# Patient Record
Sex: FEMALE | Race: ASIAN | NOT HISPANIC OR LATINO | Employment: FULL TIME | URBAN - METROPOLITAN AREA
[De-identification: names, ages, dates, MRNs, and addresses within clinical notes are randomized per-mention and may not be internally consistent; named-entity substitution may affect disease eponyms.]

---

## 2017-02-27 DIAGNOSIS — I10 ESSENTIAL HYPERTENSION WITH GOAL BLOOD PRESSURE LESS THAN 140/90: ICD-10-CM

## 2017-03-01 RX ORDER — LOSARTAN POTASSIUM 25 MG/1
TABLET ORAL
Qty: 90 TABLET | Refills: 1 | Status: SHIPPED | OUTPATIENT
Start: 2017-03-01 | End: 2017-06-13

## 2017-03-01 NOTE — TELEPHONE ENCOUNTER
Prescription approved per [facility], [facility] or [facility] refill protocol.  Yoon Bueno RN  [facility] [employer]

## 2017-03-01 NOTE — TELEPHONE ENCOUNTER
Losartan      Last Written Prescription Date: 9/2/16  Last Fill Quantity: 90, # refills: 1  Last Office Visit with [facility], [facility] or [facility] prescribing provider: 9/2/16       Potassium   Date Value Ref Range Status   04/28/2016 4.2 3.4 - 5.3 mmol/L Final     Creatinine   Date Value Ref Range Status   04/28/2016 0.77 0.52 - 1.04 mg/dL Final     BP Readings from Last 3 Encounters:   09/02/16 120/80   06/23/16 120/82   04/28/16 128/88     Nell Victor CMA

## 2017-06-13 ENCOUNTER — OFFICE VISIT (OUTPATIENT)
Dept: FAMILY MEDICINE | Facility: CLINIC | Age: 42
End: 2017-06-13
Payer: COMMERCIAL

## 2017-06-13 VITALS
HEIGHT: 63 IN | TEMPERATURE: 99 F | SYSTOLIC BLOOD PRESSURE: 136 MMHG | DIASTOLIC BLOOD PRESSURE: 88 MMHG | WEIGHT: 152 LBS | OXYGEN SATURATION: 97 % | HEART RATE: 76 BPM | BODY MASS INDEX: 26.93 KG/M2

## 2017-06-13 DIAGNOSIS — I10 ESSENTIAL HYPERTENSION WITH GOAL BLOOD PRESSURE LESS THAN 140/90: ICD-10-CM

## 2017-06-13 PROCEDURE — 80053 COMPREHEN METABOLIC PANEL: CPT | Performed by: FAMILY MEDICINE

## 2017-06-13 PROCEDURE — 36415 COLL VENOUS BLD VENIPUNCTURE: CPT | Performed by: FAMILY MEDICINE

## 2017-06-13 PROCEDURE — 99213 OFFICE O/P EST LOW 20 MIN: CPT | Performed by: FAMILY MEDICINE

## 2017-06-13 RX ORDER — LOSARTAN POTASSIUM 25 MG/1
25 TABLET ORAL DAILY
Qty: 90 TABLET | Refills: 1 | Status: SHIPPED | OUTPATIENT
Start: 2017-06-13 | End: 2017-07-12 | Stop reason: DRUGHIGH

## 2017-06-13 NOTE — NURSING NOTE
"Chief Complaint   Patient presents with     Hypertension       Initial /88  Pulse 76  Temp 99  F (37.2  C) (Tympanic)  Ht 5' 2.5\" (1.588 m)  Wt 152 lb (68.9 kg)  LMP 06/03/2017  SpO2 97%  BMI 27.36 kg/m2 Estimated body mass index is 27.36 kg/(m^2) as calculated from the following:    Height as of this encounter: 5' 2.5\" (1.588 m).    Weight as of this encounter: 152 lb (68.9 kg).  Medication Reconciliation: complete  "

## 2017-06-13 NOTE — PATIENT INSTRUCTIONS
Check labs  Take medications as directed.  Treatment  and  cares discussed   Follow up if problem or concern

## 2017-06-13 NOTE — PROGRESS NOTES
SUBJECTIVE:                                                    Juany Blandon is a 41 year old female who presents to clinic today for the following health issues:      Hypertension Follow-up      Outpatient blood pressures are being checked at home.  Results are slightly elevated sometimes  140/110,not sure if her machine could be causing error . She feels well otherwise . Denies any chest pain, palpitation, sob, leg edema etc.     Low Salt Diet: no added salt, although could do better with diet        Amount of exercise or physical activity: None    Problems taking medications regularly: No    Medication side effects: none    Diet: regular (no restrictions)          Problem list and histories reviewed & adjusted, as indicated.  Additional history: as documented    Patient Active Problem List   Diagnosis     JOINT PAIN-MULT JTS, W/U NEG ~     Essential hypertension     CARDIOVASCULAR SCREENING; LDL GOAL LESS THAN 160     Former smoker     Lumbar radiculopathy     HTN, goal below 140/90     Gastroesophageal reflux disease, esophagitis presence not specified     Seasonal allergic rhinitis     Essential hypertension with goal blood pressure less than 140/90     Past Surgical History:   Procedure Laterality Date     C NONSPECIFIC PROCEDURE      L BREAST BX, bening 2014       SECTION             Social History   Substance Use Topics     Smoking status: Former Smoker     Packs/day: 0.50     Types: Cigarettes     Quit date: 2010     Smokeless tobacco: Never Used     Alcohol use No     Family History   Problem Relation Age of Onset     DIABETES Maternal Grandmother      Arthritis Paternal Grandmother      Blood Disease Father      non higd lymphoma     Gynecology Mother      fibroids     Lipids Mother      C.A.D. No family hx of      Breast Cancer No family hx of      Cancer - colorectal No family hx of      Hypertension Mother            Reviewed and updated as needed this visit by clinical  "staff       Reviewed and updated as needed this visit by Provider         ROS:  Constitutional, HEENT, cardiovascular, pulmonary, GI, , musculoskeletal, neuro, skin, endocrine and psych systems are negative, except as otherwise noted.    OBJECTIVE:                                                    /88  Pulse 76  Temp 99  F (37.2  C) (Tympanic)  Ht 5' 2.5\" (1.588 m)  Wt 152 lb (68.9 kg)  LMP 06/03/2017  SpO2 97%  BMI 27.36 kg/m2  Body mass index is 27.36 kg/(m^2).  GENERAL: healthy, alert and no distress  NECK: no adenopathy  RESP: lungs clear to auscultation - no rales, rhonchi or wheezes  CV: regular rate and rhythm, normal S1 S2, no S3 or S4,   ABDOMEN: soft, nontender, no hepatosplenomegaly, no masses and bowel sounds normal  MS: no edema         ASSESSMENT/PLAN:                                                        (I10) Essential hypertension with goal blood pressure less than 140/90  Comment:   Plan: losartan (COZAAR) 25 MG tablet, Comprehensive         metabolic panel            BP in adequate control today . Discussed cares, low fat low slat diet etc. Check labs, call pt with results. Refill given. encouraged clinic nurse visit for  BP check and bring her monitor for check to make sure it is not malfunctioning  Follow up  if problem or concerns       Patient expressed understanding and agreement with treatment plan. All patient's questions were answered, will let me know if has more later.  Medications: Rx's: Reviewed the potential side effects/complications of medications prescribed.       Korina Garcia MD  [facility]    "

## 2017-06-13 NOTE — MR AVS SNAPSHOT
After Visit Summary   6/13/2017    Juany Blandon    MRN: [id number]           Patient Information     Date Of Birth          1975        Visit Information        Provider Department      6/13/2017 4:30 PM Korina Garcia MD [facility]        Today's Diagnoses     Essential hypertension with goal blood pressure less than 140/90          Care Instructions    Check labs  Take medications as directed.  Treatment  and  cares discussed   Follow up if problem or concern             Follow-ups after your visit        Who to contact     If you have questions or need follow up information about today's clinic visit or your schedule please contact [facility] directly at [phone].  Normal or non-critical lab and imaging results will be communicated to you by MyChart, letter or phone within 4 business days after the clinic has received the results. If you do not hear from us within 7 days, please contact the clinic through [employer] or phone. If you have a critical or abnormal lab result, we will notify you by phone as soon as possible.  Submit refill requests through [employer] or call your pharmacy and they will forward the refill request to us. Please allow 3 business days for your refill to be completed.          Additional Information About Your Visit        MyChart Information     [employer] gives you secure access to your electronic health record. If you see a primary care provider, you can also send messages to your care team and make appointments. If you have questions, please call your primary care clinic.  If you do not have a primary care provider, please call [phone] and they will assist you.        Care EveryWhere ID     This is your Care EveryWhere ID. This could be used by other organizations to access your [facility] medical records  [id number]        Your Vitals Were     Pulse Temperature Height Last Period Pulse Oximetry BMI (Body Mass Index)  "   76 99  F (37.2  C) (Tympanic) 5' 2.5\" (1.588 m) 06/03/2017 97% 27.36 kg/m2       Blood Pressure from Last 3 Encounters:   06/13/17 136/88   09/02/16 120/80   06/23/16 120/82    Weight from Last 3 Encounters:   06/13/17 152 lb (68.9 kg)   09/02/16 157 lb 9.6 oz (71.5 kg)   06/23/16 157 lb 3.2 oz (71.3 kg)              We Performed the Following     Comprehensive metabolic panel          Today's Medication Changes          These changes are accurate as of: 6/13/17  4:43 PM.  If you have any questions, ask your nurse or doctor.               These medicines have changed or have updated prescriptions.        Dose/Directions    losartan 25 MG tablet   Commonly known as:  COZAAR   This may have changed:  See the new instructions.   Used for:  Essential hypertension with goal blood pressure less than 140/90   Changed by:  Korina Garcia MD        Dose:  25 mg   Take 1 tablet (25 mg) by mouth daily   Quantity:  90 tablet   Refills:  1            Where to get your medicines      These medications were sent to [id number] - [place], [place] - [address] AT [address]  [address], [place] [address]     Phone:  [phone]     losartan 25 MG tablet                Primary Care Provider Office Phone # Fax #    Korina Garcia -[phone]       [facility] [address] [place] [address] [address]  [place] [address]        Thank you!     [facility]  [facility] care. Our goal is always to provide you with excellent care. Hearing back from our patients is one way we can continue to improve our services. Please take a few minutes to complete the written survey that you may receive in the mail after your visit with us. Thank you!             Your Updated Medication List - Protect others around you: Learn how to safely use, store and throw away your medicines at www.disposemymeds.org.          This list " is accurate as of: 6/13/17  4:43 PM.  Always use your most recent med list.                   Brand Name Dispense Instructions for use    losartan 25 MG tablet    COZAAR    90 tablet    Take 1 tablet (25 mg) by mouth daily

## 2017-06-15 LAB
ALBUMIN SERPL-MCNC: 3.8 G/DL (ref 3.4–5)
ALP SERPL-CCNC: 63 U/L (ref 40–150)
ALT SERPL W P-5'-P-CCNC: 22 U/L (ref 0–50)
ANION GAP SERPL CALCULATED.3IONS-SCNC: 8 MMOL/L (ref 3–14)
AST SERPL W P-5'-P-CCNC: 19 U/L (ref 0–45)
BILIRUB SERPL-MCNC: 1 MG/DL (ref 0.2–1.3)
BUN SERPL-MCNC: 19 MG/DL (ref 7–30)
CALCIUM SERPL-MCNC: 8.7 MG/DL (ref 8.5–10.1)
CHLORIDE SERPL-SCNC: 101 MMOL/L (ref 94–109)
CO2 SERPL-SCNC: 28 MMOL/L (ref 20–32)
CREAT SERPL-MCNC: 0.79 MG/DL (ref 0.52–1.04)
GFR SERPL CREATININE-BSD FRML MDRD: 80 ML/MIN/1.7M2
GLUCOSE SERPL-MCNC: 82 MG/DL (ref 70–99)
POTASSIUM SERPL-SCNC: 3.8 MMOL/L (ref 3.4–5.3)
PROT SERPL-MCNC: 7.4 G/DL (ref 6.8–8.8)
SODIUM SERPL-SCNC: 137 MMOL/L (ref 133–144)

## 2017-07-07 ENCOUNTER — APPOINTMENT (OUTPATIENT)
Dept: GENERAL RADIOLOGY | Facility: CLINIC | Age: 42
End: 2017-07-07
Attending: PHYSICIAN ASSISTANT
Payer: COMMERCIAL

## 2017-07-07 ENCOUNTER — HOSPITAL ENCOUNTER (EMERGENCY)
Facility: CLINIC | Age: 42
Discharge: HOME | End: 2017-07-07
Attending: PHYSICIAN ASSISTANT | Admitting: PHYSICIAN ASSISTANT
Payer: COMMERCIAL

## 2017-07-07 VITALS
WEIGHT: 147 LBS | HEART RATE: 79 BPM | HEIGHT: 62 IN | BODY MASS INDEX: 27.05 KG/M2 | OXYGEN SATURATION: 97 % | RESPIRATION RATE: 16 BRPM | DIASTOLIC BLOOD PRESSURE: 97 MMHG | SYSTOLIC BLOOD PRESSURE: 143 MMHG | TEMPERATURE: 98.6 F

## 2017-07-07 DIAGNOSIS — R07.89 ATYPICAL CHEST PAIN: ICD-10-CM

## 2017-07-07 DIAGNOSIS — R04.0 EPISTAXIS: ICD-10-CM

## 2017-07-07 LAB
ANION GAP SERPL CALCULATED.3IONS-SCNC: 6 MMOL/L (ref 3–14)
BASOPHILS # BLD AUTO: 0 10E9/L (ref 0–0.2)
BASOPHILS NFR BLD AUTO: 0.3 %
BUN SERPL-MCNC: 18 MG/DL (ref 7–30)
CALCIUM SERPL-MCNC: 8.1 MG/DL (ref 8.5–10.1)
CHLORIDE SERPL-SCNC: 104 MMOL/L (ref 94–109)
CO2 SERPL-SCNC: 30 MMOL/L (ref 20–32)
CREAT SERPL-MCNC: 0.75 MG/DL (ref 0.52–1.04)
DIFFERENTIAL METHOD BLD: NORMAL
EOSINOPHIL # BLD AUTO: 0.1 10E9/L (ref 0–0.7)
EOSINOPHIL NFR BLD AUTO: 1.7 %
ERYTHROCYTE [DISTWIDTH] IN BLOOD BY AUTOMATED COUNT: 12.4 % (ref 10–15)
GFR SERPL CREATININE-BSD FRML MDRD: 85 ML/MIN/1.7M2
GLUCOSE SERPL-MCNC: 105 MG/DL (ref 70–99)
HCT VFR BLD AUTO: 37.2 % (ref 35–47)
HGB BLD-MCNC: 13.2 G/DL (ref 11.7–15.7)
IMM GRANULOCYTES # BLD: 0 10E9/L (ref 0–0.4)
IMM GRANULOCYTES NFR BLD: 0.1 %
INTERPRETATION ECG - MUSE: NORMAL
LYMPHOCYTES # BLD AUTO: 3 10E9/L (ref 0.8–5.3)
LYMPHOCYTES NFR BLD AUTO: 39.6 %
MCH RBC QN AUTO: 31.4 PG (ref 26.5–33)
MCHC RBC AUTO-ENTMCNC: 35.5 G/DL (ref 31.5–36.5)
MCV RBC AUTO: 89 FL (ref 78–100)
MONOCYTES # BLD AUTO: 0.4 10E9/L (ref 0–1.3)
MONOCYTES NFR BLD AUTO: 5.8 %
NEUTROPHILS # BLD AUTO: 4 10E9/L (ref 1.6–8.3)
NEUTROPHILS NFR BLD AUTO: 52.5 %
NRBC # BLD AUTO: 0 10*3/UL
NRBC BLD AUTO-RTO: 0 /100
PLATELET # BLD AUTO: 272 10E9/L (ref 150–450)
POTASSIUM SERPL-SCNC: 4 MMOL/L (ref 3.4–5.3)
RBC # BLD AUTO: 4.2 10E12/L (ref 3.8–5.2)
SODIUM SERPL-SCNC: 140 MMOL/L (ref 133–144)
TROPONIN I SERPL-MCNC: NORMAL UG/L (ref 0–0.04)
WBC # BLD AUTO: 7.7 10E9/L (ref 4–11)

## 2017-07-07 PROCEDURE — 84484 ASSAY OF TROPONIN QUANT: CPT | Performed by: PHYSICIAN ASSISTANT

## 2017-07-07 PROCEDURE — 99285 EMERGENCY DEPT VISIT HI MDM: CPT

## 2017-07-07 PROCEDURE — 30901 CONTROL OF NOSEBLEED: CPT | Mod: RT

## 2017-07-07 PROCEDURE — 85025 COMPLETE CBC W/AUTO DIFF WBC: CPT | Performed by: PHYSICIAN ASSISTANT

## 2017-07-07 PROCEDURE — 80048 BASIC METABOLIC PNL TOTAL CA: CPT | Performed by: PHYSICIAN ASSISTANT

## 2017-07-07 PROCEDURE — 71020 XR CHEST 2 VW: CPT

## 2017-07-07 PROCEDURE — 93005 ELECTROCARDIOGRAM TRACING: CPT

## 2017-07-07 RX ORDER — LIDOCAINE HYDROCHLORIDE 40 MG/ML
SOLUTION TOPICAL
Status: DISCONTINUED
Start: 2017-07-07 | End: 2017-07-08 | Stop reason: HOSPADM

## 2017-07-07 NOTE — ED AVS SNAPSHOT
Emergency Department    [address] [place] [address]    Phone:  [phone]    Fax:  [phone]                                       Juany Blandon   MRN: [id number]    Department:   Emergency Department   Date of Visit:  7/7/2017           After Visit Summary Signature Page     I have received my discharge instructions, and my questions have been answered. I have discussed any challenges I see with this plan with the nurse or doctor.    ..........................................................................................................................................  Patient/Patient Representative Signature      ..........................................................................................................................................  Patient Representative Print Name and Relationship to Patient    ..................................................               ................................................  Date                                            Time    ..........................................................................................................................................  Reviewed by Signature/Title    ...................................................              ..............................................  Date                                                            Time

## 2017-07-08 ASSESSMENT — ENCOUNTER SYMPTOMS
NAUSEA: 0
BACK PAIN: 0
NECK PAIN: 0
FEVER: 0
DIZZINESS: 0
SHORTNESS OF BREATH: 0
VOMITING: 0
CHILLS: 0
COUGH: 0
ABDOMINAL PAIN: 0
LIGHT-HEADEDNESS: 1
PALPITATIONS: 0

## 2017-07-08 NOTE — DISCHARGE INSTRUCTIONS
"  Discharge Instructions  Epistaxis    Today you were seen for a nosebleed.   Nosebleeds (the medical term is \"epistaxis\") are very common. Almost every person has had at least one in their lifetime.  Although the amount of blood loss can appear dramatic, nosebleeds rarely cause serious problems.  The most common causes are dry air or nose picking, but they also are common in people who have allergies, high blood pressure or are on blood thinners, such as Coumadin, Aspirin or Plavix. If you or your child gets a nosebleed, the important thing is to know how to take care of it. With the right self-care, most nosebleeds will stop on their own.    Return to the Emergency Department if:    Your nose is bleeding a very large amount of blood.    You get very pale, faint, or tired.    You cannot get the bleeding to stop after following these instructions.    A nosebleed happens after recent nasal surgery or if you have a known nasal tumor.    You have new, serious symptoms, such as chest pain.    You get a nosebleed after an injury, such as being hit in the face, and you are concerned that you could have other injuries (like a broken bone).    Treatment:    Your doctor may tell you to use a decongestant nose spray, like Afrin  (oxymetazoline), in both nostrils in the morning and at night for the next three days. Do not use this medicine for more than three days at a time.  If you do it will cause nasal congestion.     You should apply a small amount of Vaseline  to the inside of your nostrils for moisture before bed.  Using a humidifier in your bedroom will help as well.    For the next three days, do not blow your nose or put anything in your nose. You may sniffle, or dab the outside of your nose.    Do not bend with your head below your waist for the next three days. Do not lift anything so heavy that you have to strain.     If you received nasal packing, please do not remove the packing until seen by an Ear Nose and " Throat specialist.  If antibiotics have been given with the packing please take as directed.    If your nose starts to bleed again:    Blow your nose to get rid of some of the clots that have formed inside your nostrils. This may increase the bleeding temporarily, but that's OK.    Spray decongestant nose spray (like Afrin ) into both nostrils to constrict the blood vessels.    Sit or stand while bending forward slightly at the waist. Do not lie down or tilt your head back. This may cause you to swallow blood and can lead to vomiting.     the soft part of BOTH nostrils at the bottom of your nose and squeeze your nose closed for at least 5 minutes (for children) or 10 to 15 minutes (for adults). You can use your fingers, or the clip we gave you here. Use a clock to time yourself. Do not release the pressure every so often to check whether the bleeding has stopped. Many people hurt their chances of stopping the bleeding by releasing the pressure too soon or too often.    If you follow the steps outlined above, and your nose continues to bleed, repeat all the steps once more. Apply pressure for a total of at least 30 minutes. If you continue to bleed even then, return to the Emergency Department or go to an urgent care clinic.    If you keep having nose bleeds, schedule an appointment with your regular doctor, or with an Ear, Nose, and Throat Specialist.  If you were given a prescription for medicine here today, be sure to read all of the information (including the package insert) that comes with your prescription.  This will include important information about the medicine, its side effects, and any warnings that you need to know about.  The pharmacist who fills the prescription can provide more information and answer questions you may have about the medicine.  If you have questions or concerns that the pharmacist cannot address, please call or return to the Emergency Department.   Discharge Instructions  Chest  Pain    You have been seen today for chest pain or discomfort.  At this time, your doctor has found no signs that your chest pain is due to a serious or life-threatening condition, (or you have declined more testing and/or admission to the hospital). However, sometimes there is a serious problem that does not show up right away. Your evaluation today may not be complete and you may need further testing and evaluation.     You need to follow-up with your regular doctor within 3 days.    Return to the Emergency Department if:    Your chest pain changes, gets worse, starts to happen more often, or comes with less activity.    You are short of breath.    You get very weak or tired.    You pass out or faint.    You have any new symptoms, like fever, cough, numb legs, or you cough up blood.    You have anything else that worries you.    Until you follow-up with your regular doctor please do the following:    Take one aspirin daily unless you have an allergy or are told not to by your doctor.    If a stress test appointment has been made, go to the appointment.    If you have questions, contact your regular doctor.    If your doctor today has told you to follow-up with your regular doctor, it is very important that you make an appointment with your clinic and go to the appointment.  If you do not follow-up with your primary doctor, it may result in missing an important development which could result in permanent injury or disability and/or lasting pain.  If there is any problem keeping your appointment, call your doctor or return to the Emergency Department.    If you were given a prescription for medicine here today, be sure to read all of the information (including the package insert) that comes with your prescription.  This will include important information about the medicine, its side effects, and any warnings that you need to know about.  The pharmacist who fills the prescription can provide more information and  answer questions you may have about the medicine.  If you have questions or concerns that the pharmacist cannot address, please call or return to the Emergency Department.         Remember that you can always come back to the Emergency Department if you are not able to see your regular doctor in the amount of time listed above, if you get any new symptoms, or if there is anything that worries you.

## 2017-07-08 NOTE — ED PROVIDER NOTES
"  History     Chief Complaint:  Epistaxis and chest pain    HPI   Juany Blandon is a 41 year old female with a history of HTN and GERD who presents to the ED for evaluation of epistaxis and chest pain. The patient presents with multiple complaints today including epistaxis, chest pain as well as concern for elevated blood pressure.  The patient notes that about 1 PM this afternoon she developed diffuse chest pressure that has been constant since onset.  This does not radiate.  Nothing has made it better or worse.  She notes associated lightheadedness, but no shortness of breath.  At 7 PM this evening the patient developed a right-sided epistaxis that resolved after 30 minutes.  She then took her blood pressure and noticed it to be elevated in the 140s over 100s.  The compilation of these symptoms concerned her and she presented to the emergency department.  She notes that she has had bloody noses with high blood pressure in the past.  She's been taking losartan and often has fluctuations in the control of her blood pressure.  She has had similar chest pressure before in the past and has had a negative stress echocardiogram in 2013 as well as a fairly normal echocardiogram from 2016 as detailed below.  She denies any fevers, chills, palpitations, shortness of breath or cough,abdominal pain, nausea or vomiting, leg pain or swelling, or any other symptoms or concerns.    Echocardiogram from 4/15/16:  \"The visual ejection fraction is estimated at 55-60%.  There is mild concentric left ventricular hypertrophy.\"    Cardiac Risk Factors:  CAD:    no  Hypertension:   yes  Hyperlipidemia:  no  Diabetes:   no  Tobacco use:   Patient reports no, but old history says quit 7 years ago   Gender:   Female   Age:    41  Familial Hx of CAD:  no     PE/DVT Risk Factors:   Hx of PE/DVT:   no  Hx of clotting disorder:  no  Hx of cancer:    no  Tobacco use:    Patient reports no, but old history says quit 7 years ago   Hormone " therapy:   no  Pregnancy:    no  Prolonged immobilization:  no  Recent surgery:   no  Recent travel:    no  Familial Hx of PE/DVT:  no     Allergies:  No Known Allergies   Medications:      losartan (COZAAR) 25 MG tablet     Past Medical History:    Past Medical History:   Diagnosis Date     HX FIBROADENOMA L BREAST      JOINT PAIN-MULT JDEANGELO, W/U NEG ~      SPONTANEOUS  03       Patient Active Problem List    Diagnosis Date Noted     Essential hypertension with goal blood pressure less than 140/90 2016     Priority: Medium     Seasonal allergic rhinitis 2016     Priority: Medium     also dust, cats        Gastroesophageal reflux disease, esophagitis presence not specified 2016     Priority: Medium     HTN, goal below 140/90 10/13/2014     Lumbar radiculopathy 2013     Former smoker 2012     CARDIOVASCULAR SCREENING; LDL GOAL LESS THAN 160 10/31/2010     Essential hypertension 2008     Problem list name updated by automated process. Provider to review       JOINT PAIN-TIARRA KOEHLER, W/U NEG ~1998 10/26/2003      Past Surgical History:    Past Surgical History:   Procedure Laterality Date     C NONSPECIFIC PROCEDURE      L BREAST BX, bening 2014       SECTION            Family History:    family history includes Arthritis in her paternal grandmother; Blood Disease in her father; DIABETES in her maternal grandmother; Gynecology in her mother; Hypertension in her mother; Lipids in her mother. There is no history of C.A.D., Breast Cancer, or Cancer - colorectal.    Social History:   reports that she quit smoking about 7 years ago. Her smoking use included Cigarettes. She smoked 0.50 packs per day. She has never used smokeless tobacco. She reports that she does not drink alcohol or use illicit drugs.    PCP: Korina Garcia     Review of Systems   Constitutional: Negative for chills and fever.   HENT: Positive for nosebleeds.    Respiratory: Negative for  "cough and shortness of breath.    Cardiovascular: Positive for chest pain. Negative for palpitations and leg swelling.   Gastrointestinal: Negative for abdominal pain, nausea and vomiting.   Musculoskeletal: Negative for back pain and neck pain.        - leg pain   Neurological: Positive for light-headedness. Negative for dizziness.   All other systems reviewed and are negative.        Physical Exam     Patient Vitals for the past 24 hrs:   BP Temp Temp src Pulse Heart Rate Resp SpO2 Height Weight   07/07/17 2241 (!) 143/97 - - - 78 - 97 % - -   07/07/17 2240 (!) 143/97 - - - - 16 98 % - -   07/07/17 2129 138/90 - - - - - - - -   07/07/17 2103 (!) 156/96 98.6  F (37  C) Oral 79 - 16 98 % 1.575 m (5' 2\") 66.7 kg (147 lb)        Physical Exam  General: Resting comfortably on the gurney.    Head:  The scalp, head and face appear normal. No evidence of trauma.   ENT:  Pupils are equal, round and reactive to light.     Oropharynx is moist.  No uvular deviation. Posterior oropharynx is without erythema, exudate or tonsillar swelling.     Clotted area of friability to the right anterior nasal septum. No active bleeding. No evidence of posterior bleeding.   Neck:  Supple, no rigidity noted. Normal ROM.     Trachea midline. No mass detected.    Resp:  Non-labored breathing. No tachypnea.     Lung fields clear to auscultation without wheezes or rales.   CV:  Regular rate and rhythm. Normal S1 and S2, no S3 or S4.     No pathological murmur detected.     Radial and PT pulses intact and symmetric.   GI:  Abdomen is soft and non-distended.     Non-tender to palpation in all four quadrants.    MS:  Normal muscular tone.     No asymmetrical lower leg swelling or calf tenderness.   Neuro:  Awake and alert. Speech is clear.   Skin:  No rash or pallor.  Psych: Normal affect. Appropriate interactions.       Emergency Department Course     ECG @ 2142  Vent Rate: 75 BPM   RI Interval: 180 ms   QRS duration: 74 ms   QT/QTc: 412/460 ms "   P-R-T Axis: 69 53 53  Findings: Normal sinus rhythm. No significant change compared to ECG dated 4/16/16.    Imaging:  XR Chest 2 Views   Final Result   IMPRESSION: No active infiltrate.          JUANY COPELAND MD           Laboratory:  CBC: WNL (WBC 7.7, HGB 13.2, platelets 272)  BMP: Glucose 105 high, Calcium 8.1 low, otherwise WNL (Creatinine 0.75)  Troponin 2147: <0.015    Procedures:  PROCEDURE: Silver Nitrate Cautery     PROCEDURE NOTE: The patient's right nare was prepped with lidocaine.  The location of the patient's bleeding in the anterior septum was identified and cauterized with silver nitrate.  The patient tolerated the procedure well and there were no complications.  She was observed in the emergency department following the procedure and had no recurrence of her bleeding.       Emergency Department Course:  Past medical records, nursing notes, and vitals reviewed.  I performed an exam of the patient and obtained history, as documented above.  IV inserted and blood drawn.   Procedure performed as above.   I rechecked the patient. Findings and plan explained to the Patient. Patient was discharged.    Impression & Plan      Medical Decision Making:  Juany is a 41-year-old female with a history of HTN, who presented with chest pain and epistaxis. Concern was for cardiac etiology, dissection, PE, pneumonia, among others. ECG was obtained and is normal. There is no evidence of acute ischemic event or arrhythmia. A troponin 6+ hours after onset of symptoms is negative, making acute cardiac event less likely. The patient is low risk by Wells and is PERC negative. Her basic lab work returned unremarkable. No evidence of leukocytosis, anemia, concerning electrolyte abnormality, or renal dysfunction. CXR is normal without signs of infiltrate, cardiomegaly, widened mediastinum, pneumothorax or any other acute abnormality. She has symmetric pulses, no back pain, and no widened mediastinum, making dissection  unlikely. At this time the exact cause of her chest pressure is not clear, though she notes gradual improvement throughout the day. She has had similar in the past. She has a Heart Score of 1 and I feel she is safe for discharge. Out patient stress test was ordered. She also noted concern for high blood pressure and she is mildly high here, more so in the diastolic. She notes frequently fluctuations. No signs of hypertensive urgency or emergency and I do feel she is safe for discharge with close PCP recheck. She notes follow-up with her PCP early next week already scheduled.     The patient also presented with epistaxis. She is not on blood thinners. No septal hematoma on exam. The bleeding was controlled as described above. I discussed the plan and any additional questions with the patient. She verbalized understanding and agreement with the plan.  We discussed reasons to return to the ED including syncope, worsening or changing chest pain, dyspnea, persistent nose bleeding, or any other complications or concerns.       Diagnosis:    ICD-10-CM    1. Epistaxis R04.0    2. Atypical chest pain R07.89 Exercise Stress Echocardiogram          7/7/2017   Zaida Wright PA-C Hanson, Roxanne M, PA-C  07/08/17 0152

## 2017-07-12 ENCOUNTER — OFFICE VISIT (OUTPATIENT)
Dept: FAMILY MEDICINE | Facility: CLINIC | Age: 42
End: 2017-07-12
Payer: COMMERCIAL

## 2017-07-12 VITALS
HEIGHT: 62 IN | TEMPERATURE: 98.9 F | BODY MASS INDEX: 27.42 KG/M2 | DIASTOLIC BLOOD PRESSURE: 88 MMHG | OXYGEN SATURATION: 97 % | WEIGHT: 149 LBS | SYSTOLIC BLOOD PRESSURE: 133 MMHG | HEART RATE: 81 BPM

## 2017-07-12 DIAGNOSIS — J30.89 SEASONAL ALLERGIC RHINITIS DUE TO OTHER ALLERGIC TRIGGER, UNSPECIFIED CHRONICITY: ICD-10-CM

## 2017-07-12 DIAGNOSIS — I10 ESSENTIAL HYPERTENSION WITH GOAL BLOOD PRESSURE LESS THAN 140/90: ICD-10-CM

## 2017-07-12 DIAGNOSIS — Z00.00 ROUTINE HISTORY AND PHYSICAL EXAMINATION OF ADULT: ICD-10-CM

## 2017-07-12 DIAGNOSIS — J02.9 SORE THROAT: Primary | ICD-10-CM

## 2017-07-12 DIAGNOSIS — J01.80 OTHER ACUTE SINUSITIS: ICD-10-CM

## 2017-07-12 DIAGNOSIS — R09.81 NASAL CONGESTION: ICD-10-CM

## 2017-07-12 LAB
DEPRECATED S PYO AG THROAT QL EIA: NORMAL
MICRO REPORT STATUS: NORMAL
SPECIMEN SOURCE: NORMAL

## 2017-07-12 PROCEDURE — 99213 OFFICE O/P EST LOW 20 MIN: CPT | Mod: 25 | Performed by: FAMILY MEDICINE

## 2017-07-12 PROCEDURE — 36415 COLL VENOUS BLD VENIPUNCTURE: CPT | Performed by: FAMILY MEDICINE

## 2017-07-12 PROCEDURE — 80061 LIPID PANEL: CPT | Performed by: FAMILY MEDICINE

## 2017-07-12 PROCEDURE — 87880 STREP A ASSAY W/OPTIC: CPT | Performed by: FAMILY MEDICINE

## 2017-07-12 PROCEDURE — 87081 CULTURE SCREEN ONLY: CPT | Performed by: FAMILY MEDICINE

## 2017-07-12 PROCEDURE — 99396 PREV VISIT EST AGE 40-64: CPT | Performed by: FAMILY MEDICINE

## 2017-07-12 RX ORDER — FLUTICASONE PROPIONATE 50 MCG
1-2 SPRAY, SUSPENSION (ML) NASAL DAILY
Qty: 16 G | Status: SHIPPED | OUTPATIENT
Start: 2017-07-12 | End: 2018-01-31

## 2017-07-12 RX ORDER — AZITHROMYCIN 250 MG/1
TABLET, FILM COATED ORAL
Qty: 6 TABLET | Refills: 0 | Status: SHIPPED | OUTPATIENT
Start: 2017-07-12 | End: 2018-01-31

## 2017-07-12 RX ORDER — LOSARTAN POTASSIUM 50 MG/1
50 TABLET ORAL DAILY
Qty: 90 TABLET | Refills: 1 | Status: SHIPPED | OUTPATIENT
Start: 2017-07-12 | End: 2017-10-17

## 2017-07-12 RX ORDER — FEXOFENADINE HCL 180 MG/1
180 TABLET ORAL DAILY
Qty: 30 TABLET | Refills: 1 | COMMUNITY
Start: 2017-07-12 | End: 2018-01-31

## 2017-07-12 NOTE — PROGRESS NOTES
SUBJECTIVE:   CC: Juany Blandon is an 41 year old woman who presents for preventive health visit.     Healthy Habits:    Do you get at least three servings of calcium containing foods daily (dairy, green leafy vegetables, etc.)? yes    Amount of exercise or daily activities, outside of work: No     Problems taking medications regularly No    Medication side effects: No    Have you had an eye exam in the past two years? yes    Do you see a dentist twice per year? yes    Do you have sleep apnea, excessive snoring or daytime drowsiness?no        RESPIRATORY SYMPTOMS      Duration:  8 days     Description  sore throat and cough , not going away. Becoming productive of mucous now looked slightly blood tinged and sometimes bloody nose .   She was seen at er last week and they  had cautrize the nose so bleeding  has  improved , but still feels ongoing congestion. Sinus HA lately, so just concerned with possible  sinus infection    Has allergies and it is flaring up and not taking anything. No fever or chills      Severity: mild    Accompanying signs and symptoms: None    History (predisposing factors):  none    Precipitating or alleviating factors: None    Therapies tried and outcome:  Advil         Here to do follow up on HTN. doing well on current medication. Patient  thinks her BP - sometimes it fluctuates and often gets higher reading average 140/ 90 or less. Denies any cardiovascular sx  of chest , pain, palpitation, SOB, leg edema etc.  Tries to eat well and no regular exercise but tries to walk regularly. Feels well otherwise . Due for labs and need refill on medications.         Today's PHQ-2 Score:   PHQ-2 ( 1999 [employer]) 7/12/2017 9/2/2016   Q1: Little interest or pleasure in doing things 0 0   Q2: Feeling down, depressed or hopeless 0 0   PHQ-2 Score 0 0       Abuse: Current or Past(Physical, Sexual or Emotional)- No  Do you feel safe in your environment - Yes    Social History   Substance Use Topics      Smoking status: Former Smoker     Packs/day: 0.50     Types: Cigarettes     Quit date: 2010     Smokeless tobacco: Never Used     Alcohol use No     The patient does not drink >3 drinks per day nor >7 drinks per week.    Reviewed orders with patient.  Reviewed health maintenance and updated orders accordingly - Yes  Patient Active Problem List   Diagnosis     JOINT PAIN-TIARRA RODO, W/U NEG ~     Essential hypertension     CARDIOVASCULAR SCREENING; LDL GOAL LESS THAN 160     Former smoker     Lumbar radiculopathy     HTN, goal below 140/90     Gastroesophageal reflux disease, esophagitis presence not specified     Seasonal allergic rhinitis     Essential hypertension with goal blood pressure less than 140/90     Past Surgical History:   Procedure Laterality Date     C NONSPECIFIC PROCEDURE      L BREAST BX, bening 2014       SECTION             Social History   Substance Use Topics     Smoking status: Former Smoker     Packs/day: 0.50     Types: Cigarettes     Quit date: 2010     Smokeless tobacco: Never Used     Alcohol use No     Family History   Problem Relation Age of Onset     DIABETES Maternal Grandmother      Arthritis Paternal Grandmother      Blood Disease Father      non higd lymphoma     Gynecology Mother      fibroids     Lipids Mother      C.A.D. No family hx of      Breast Cancer No family hx of      Cancer - colorectal No family hx of      Hypertension Mother            Patient under age 50, mutual decision reflected in health maintenance.      Pertinent mammograms are reviewed under the imaging tab.  History of abnormal Pap smear: NO - age 30- 65 PAP every 3 years recommended    Reviewed and updated as needed this visit by clinical staff         Reviewed and updated as needed this visit by Provider        Past Medical History:   Diagnosis Date     HX FIBROADENOMA L BREAST      JOINT PAIN-TIARRA RODO, W/U NEG ~      SPONTANEOUS  03        ROS:  C: NEGATIVE for  fever, chills, change in weight  I: NEGATIVE for worrisome rashes, moles or lesions  E: NEGATIVE for vision changes or irritation  ENT: as per HPI   RESP:as above  B: NEGATIVE for masses, tenderness or discharge  CV: NEGATIVE for chest pain, palpitations or peripheral edema  GI: NEGATIVE for nausea, abdominal pain, heartburn, or change in bowel habits  : NEGATIVE for unusual urinary or vaginal symptoms. Periods are regular.  M: NEGATIVE for significant arthralgias or myalgia  N: NEGATIVE for weakness, dizziness or paresthesias  E: NEGATIVE for temperature intolerance, skin/hair changes  H: NEGATIVE for bleeding problems  P: NEGATIVE for changes in mood or affect    OBJECTIVE:   LMP 06/03/2017  EXAM:  GENERAL: healthy, alert and no distress  EYES: Eyes grossly normal to inspection, PERRL and conjunctivae and sclerae normal  HENT: ear canals and TM's normal, nasal mucosa edematous  and oral mucous membranes moist, Throat with mild pharyngeal erythema, +ve  sinus  tenderness  NECK: no adenopathy, no asymmetry, masses, or scars and thyroid normal to palpation  RESP: lungs clear to auscultation - no rales, rhonchi or wheezes  BREAST: normal without masses, tenderness or nipple discharge and no palpable axillary masses or adenopathy  CV: regular rate and rhythm, normal S1 S2, no S3 or S4, no murmur, click or rub, no peripheral edema and peripheral pulses strong  ABDOMEN: soft, nontender, no hepatosplenomegaly, no masses and bowel sounds normal   (female): declined   RECTAL: declined   MS: no gross musculoskeletal defects noted, no edema  SKIN: no suspicious lesions or rashes  NEURO: Normal strength and tone, mentation intact and speech normal  PSYCH: mentation appears normal, affect normal/bright    ASSESSMENT/PLAN:     (Z00.00) Routine history and physical examination of adult  Comment:   Plan: Lipid panel reflex to direct LDL              (J02.9) Sore throat  (primary encounter diagnosis)  Comment:   Plan: Strep,  "Rapid Screen, Beta strep group A culture                (I10) Essential hypertension with goal blood pressure less than 140/90  Comment:   Plan: losartan (COZAAR) 50 MG tablet            (R09.81) Nasal congestion  Comment:   Plan: fluticasone (FLONASE) 50 MCG/ACT spray,         fexofenadine (ALLEGRA) 180 MG tablet            (J01.80) Other acute sinusitis  Comment:   Plan: azithromycin (ZITHROMAX) 250 MG tablet            (J30.89) Seasonal allergic rhinitis due to other allergic trigger, unspecified chronicity  Comment:   Plan: fluticasone (FLONASE) 50 MCG/ACT spray,         fexofenadine (ALLEGRA) 180 MG tablet            (Z68.27) BMI 27.0-27.9,adult  Comment:   Plan: Healthy diet and exercise reviewed.  Limit pop and juice intake.  Risks of obesity discussed.  Encourage exercise.  Limit TV/Computer/game time to one hour a day.      COUNSELING:   Reviewed preventive health counseling, as reflected in patient instructions       Regular exercise       Healthy diet/nutrition       Vision screening         reports that she quit smoking about 7 years ago. Her smoking use included Cigarettes. She smoked 0.50 packs per day. She has never used smokeless tobacco.    Estimated body mass index is 26.89 kg/(m^2) as calculated from the following:    Height as of 7/7/17: 5' 2\" (1.575 m).    Weight as of 7/7/17: 147 lb (66.7 kg).   Weight management plan noted, stable and monitoring    Counseling Resources:  ATP IV Guidelines  Pooled Cohorts Equation Calculator  Breast Cancer Risk Calculator  FRAX Risk Assessment  ICSI Preventive Guidelines  Dietary Guidelines for Americans, 2010  USDA's MyPlate  ASA Prophylaxis  Lung CA Screening    Korina Garcia MD  [facility] [place]  "

## 2017-07-12 NOTE — MR AVS SNAPSHOT
After Visit Summary   7/12/2017    Juany Blandon    MRN: [id number]           Patient Information     Date Of Birth          1975        Visit Information        Provider Department      7/12/2017 9:30 AM Korina Garcia MD [facility]        Today's Diagnoses     Sore throat    -  1    Routine history and physical examination of adult        Essential hypertension with goal blood pressure less than 140/90        Nasal congestion        Other acute sinusitis        Seasonal allergic rhinitis due to other allergic trigger, unspecified chronicity        BMI 27.0-27.9,adult          Care Instructions      Preventive Health Recommendations  Female Ages 40 to 49    Yearly exam:     See your health care provider every year in order to  1. Review health changes.   2. Discuss preventive care.    3. Review your medicines if your doctor prescribed any.      Get a Pap test every three years (unless you have an abnormal result and your provider advises testing more often).      If you get Pap tests with HPV test, you only need to test every 5 years, unless you have an abnormal result. You do not need a Pap test if your uterus was removed (hysterectomy) and you have not had cancer.      You should be tested each year for STDs (sexually transmitted diseases), if you're at risk.       Ask your doctor if you should have a mammogram.      Have a colonoscopy (test for colon cancer) if someone in your family has had colon cancer or polyps before age 50.       Have a cholesterol test every 5 years.       Have a diabetes test (fasting glucose) after age 45. If you are at risk for diabetes, you should have this test every 3 years.    Shots: Get a flu shot each year. Get a tetanus shot every 10 years.     Nutrition:     Eat at least 5 servings of fruits and vegetables each day.    Eat whole-grain bread, whole-wheat pasta and brown rice instead of white grains and rice.    Talk to your  "provider about Calcium and Vitamin D.     Lifestyle    Exercise at least 150 minutes a week (an average of 30 minutes a day, 5 days a week). This will help you control your weight and prevent disease.    Limit alcohol to one drink per day.    No smoking.     Wear sunscreen to prevent skin cancer.    See your dentist every six months for an exam and cleaning.          Follow-ups after your visit        Who to contact     If you have questions or need follow up information about today's clinic visit or your schedule please contact [facility] [place] directly at [phone].  Normal or non-critical lab and imaging results will be communicated to you by Sparling Studiohart, letter or phone within 4 business days after the clinic has received the results. If you do not hear from us within 7 days, please contact the clinic through Bump [employer] or phone. If you have a critical or abnormal lab result, we will notify you by phone as soon as possible.  Submit refill requests through Bringrs or call your pharmacy and they will forward the refill request to us. Please allow 3 business days for your refill to be completed.          Additional Information About Your Visit        MyChart Information     Bringrs gives you secure access to your electronic health record. If you see a primary care provider, you can also send messages to your care team and make appointments. If you have questions, please call your primary care clinic.  If you do not have a primary care provider, please call [phone] and they will assist you.        Care EveryWhere ID     This is your Care EveryWhere ID. This could be used by other organizations to access your [facility] medical records  [id number]        Your Vitals Were     Pulse Temperature Height Last Period Pulse Oximetry BMI (Body Mass Index)    81 98.9  F (37.2  C) (Tympanic) 5' 2\" (1.575 m) 07/01/2017 (Approximate) 97% 27.25 kg/m2       Blood Pressure from Last 3 Encounters:   07/12/17 133/88 "   07/07/17 (!) 143/97   06/13/17 136/88    Weight from Last 3 Encounters:   07/12/17 149 lb (67.6 kg)   07/07/17 147 lb (66.7 kg)   06/13/17 152 lb (68.9 kg)              We Performed the Following     Beta strep group A culture     Lipid panel reflex to direct LDL     OFFICE/OUTPT VISIT,EST,LEVL III     Strep, Rapid Screen          Today's Medication Changes          These changes are accurate as of: 7/12/17 10:20 AM.  If you have any questions, ask your nurse or doctor.               Start taking these medicines.        Dose/Directions    azithromycin 250 MG tablet   Commonly known as:  ZITHROMAX   Used for:  Other acute sinusitis   Started by:  Korina Garcia MD        Two tablets first day, then one tablet daily for four days.   Quantity:  6 tablet   Refills:  0       fexofenadine 180 MG tablet   Commonly known as:  ALLEGRA   Used for:  Nasal congestion, Seasonal allergic rhinitis due to other allergic trigger, unspecified chronicity   Started by:  Korina Garcia MD        Dose:  180 mg   Take 1 tablet (180 mg) by mouth daily   Quantity:  30 tablet   Refills:  1       fluticasone 50 MCG/ACT spray   Commonly known as:  FLONASE   Used for:  Nasal congestion, Seasonal allergic rhinitis due to other allergic trigger, unspecified chronicity   Started by:  Korina Garcia MD        Dose:  1-2 spray   Spray 1-2 sprays into both nostrils daily   Quantity:  16 g   Refills:  prn         These medicines have changed or have updated prescriptions.        Dose/Directions    * losartan 25 MG tablet   Commonly known as:  COZAAR   This may have changed:  Another medication with the same name was added. Make sure you understand how and when to take each.   Used for:  Essential hypertension with goal blood pressure less than 140/90   Changed by:  Korina Garcia MD        Dose:  25 mg   Take 1 tablet (25 mg) by mouth daily   Quantity:  90 tablet   Refills:  1       * losartan 50 MG tablet   Commonly  known as:  COZAAR   This may have changed:  You were already taking a medication with the same name, and this prescription was added. Make sure you understand how and when to take each.   Used for:  Essential hypertension with goal blood pressure less than 140/90   Changed by:  Korina Garcia MD        Dose:  50 mg   Take 1 tablet (50 mg) by mouth daily   Quantity:  90 tablet   Refills:  1       * Notice:  This list has 2 medication(s) that are the same as other medications prescribed for you. Read the directions carefully, and ask your doctor or other care provider to review them with you.         Where to get your medicines      These medications were sent to [id number] - [place], [place] - [address] AT [address]  [address], [place] [address]     Phone:  [phone]     azithromycin 250 MG tablet    fluticasone 50 MCG/ACT spray    losartan 50 MG tablet         Some of these will need a paper prescription and others can be bought over the counter.  Ask your nurse if you have questions.     You don't need a prescription for these medications     fexofenadine 180 MG tablet                Primary Care Provider Office Phone # Fax #    Korina Garcia [phone]       [facility] [address]  [place] [address]        Equal Access to Services     [facility]: Hadii aad ku hadasho Soomaali, waaxda luqadaha, qaybta kaalmada adeegyada, nettie gee. So [facility] [phone].    ATENCIÓN: Si habla español, tiene a jacques disposición servicios gratuitos de asistencia lingüística. Kaz al [phone].    We comply with applicable federal civil rights laws and [place] laws. We do not discriminate on the basis of race, color, national origin, age, disability sex, sexual orientation or gender identity.            Thank you!     [facility] [place]   [facility] your [facility]. Our goal is always to provide you with excellent care. Hearing back from our patients is one way we can continue to improve our services. Please take a few minutes to complete the written survey that you may receive in the mail after your visit with us. Thank you!             Your Updated Medication List - Protect others around you: Learn how to safely use, store and throw away your medicines at www.disposemymeds.org.          This list is accurate as of: 7/12/17 10:20 AM.  Always use your most recent med list.                   Brand Name Dispense Instructions for use Diagnosis    azithromycin 250 MG tablet    ZITHROMAX    6 tablet    Two tablets first day, then one tablet daily for four days.    Other acute sinusitis       fexofenadine 180 MG tablet    ALLEGRA    30 tablet    Take 1 tablet (180 mg) by mouth daily    Nasal congestion, Seasonal allergic rhinitis due to other allergic trigger, unspecified chronicity       fluticasone 50 MCG/ACT spray    FLONASE    16 g    Spray 1-2 sprays into both nostrils daily    Nasal congestion, Seasonal allergic rhinitis due to other allergic trigger, unspecified chronicity       * losartan 25 MG tablet    COZAAR    90 tablet    Take 1 tablet (25 mg) by mouth daily    Essential hypertension with goal blood pressure less than 140/90       * losartan 50 MG tablet    COZAAR    90 tablet    Take 1 tablet (50 mg) by mouth daily    Essential hypertension with goal blood pressure less than 140/90       * Notice:  This list has 2 medication(s) that are the same as other medications prescribed for you. Read the directions carefully, and ask your doctor or other care provider to review them with you.

## 2017-07-12 NOTE — NURSING NOTE
"Chief Complaint   Patient presents with     Physical       Initial /88  Pulse 81  Temp 98.9  F (37.2  C) (Tympanic)  Ht 5' 2\" (1.575 m)  Wt 149 lb (67.6 kg)  LMP 07/01/2017 (Approximate)  SpO2 97%  BMI 27.25 kg/m2 Estimated body mass index is 27.25 kg/(m^2) as calculated from the following:    Height as of this encounter: 5' 2\" (1.575 m).    Weight as of this encounter: 149 lb (67.6 kg).  Medication Reconciliation: complete  "

## 2017-07-13 LAB
BACTERIA SPEC CULT: NORMAL
CHOLEST SERPL-MCNC: 190 MG/DL
HDLC SERPL-MCNC: 44 MG/DL
LDLC SERPL CALC-MCNC: 120 MG/DL
MICRO REPORT STATUS: NORMAL
NONHDLC SERPL-MCNC: 146 MG/DL
SPECIMEN SOURCE: NORMAL
TRIGL SERPL-MCNC: 130 MG/DL

## 2017-10-17 ENCOUNTER — OFFICE VISIT (OUTPATIENT)
Dept: FAMILY MEDICINE | Facility: CLINIC | Age: 42
End: 2017-10-17
Payer: COMMERCIAL

## 2017-10-17 VITALS
HEART RATE: 68 BPM | TEMPERATURE: 98.9 F | WEIGHT: 151 LBS | SYSTOLIC BLOOD PRESSURE: 138 MMHG | DIASTOLIC BLOOD PRESSURE: 90 MMHG | BODY MASS INDEX: 27.62 KG/M2

## 2017-10-17 DIAGNOSIS — N92.6 MISSED PERIOD: Primary | ICD-10-CM

## 2017-10-17 DIAGNOSIS — I10 ESSENTIAL HYPERTENSION: ICD-10-CM

## 2017-10-17 DIAGNOSIS — I10 HTN, GOAL BELOW 140/90: ICD-10-CM

## 2017-10-17 DIAGNOSIS — Z32.01 PREGNANCY TEST POSITIVE: ICD-10-CM

## 2017-10-17 LAB — BETA HCG QUAL IFA URINE: POSITIVE

## 2017-10-17 PROCEDURE — 99214 OFFICE O/P EST MOD 30 MIN: CPT | Performed by: FAMILY MEDICINE

## 2017-10-17 PROCEDURE — 84703 CHORIONIC GONADOTROPIN ASSAY: CPT | Performed by: FAMILY MEDICINE

## 2017-10-17 RX ORDER — PRENATAL VIT/IRON FUM/FOLIC AC 27MG-0.8MG
1 TABLET ORAL DAILY
Qty: 100 TABLET | Refills: 3 | Status: SHIPPED | OUTPATIENT
Start: 2017-10-17 | End: 2019-01-16

## 2017-10-17 RX ORDER — LABETALOL 100 MG/1
100 TABLET, FILM COATED ORAL 2 TIMES DAILY
Qty: 60 TABLET | Refills: 3 | Status: SHIPPED | OUTPATIENT
Start: 2017-10-17 | End: 2018-02-23

## 2017-10-17 NOTE — PROGRESS NOTES
SUBJECTIVE:   Juany Blandon is a 41 year old female who presents to clinic today for the following health issues:      Hypertension Follow-up  Patient has history of high blood pressure. She has been taking losartan.  Pressure has been reasonably stable.  She did home pregnancy test which was positive.  LMP was almost 6 weeks ago.  Patient also have history of miscarriages 2-3 times.  + hpt yesterday at home - concerned about meds she is on      Outpatient blood pressures are being checked at home.  Results are 120s/90s.    Low Salt Diet: no added salt        Amount of exercise or physical activity: None    Problems taking medications regularly: No    Medication side effects: none  Diet: low salt      Problem list and histories reviewed & adjusted, as indicated.  Additional history: as documented    Patient Active Problem List   Diagnosis     JOINT PAIN-MULT JTS, W/U NEG ~     Essential hypertension     CARDIOVASCULAR SCREENING; LDL GOAL LESS THAN 160     Former smoker     Lumbar radiculopathy     HTN, goal below 140/90     Gastroesophageal reflux disease, esophagitis presence not specified     Seasonal allergic rhinitis     Essential hypertension with goal blood pressure less than 140/90     BMI 27.0-27.9,adult     Past Surgical History:   Procedure Laterality Date     C NONSPECIFIC PROCEDURE      L BREAST BX, bening 2014       SECTION             Social History   Substance Use Topics     Smoking status: Former Smoker     Packs/day: 0.50     Types: Cigarettes     Quit date: 2010     Smokeless tobacco: Never Used     Alcohol use No     Family History   Problem Relation Age of Onset     Blood Disease Father      non higd lymphoma     Gynecology Mother      fibroids     Lipids Mother      Hypertension Mother      DIABETES Maternal Grandmother      Arthritis Paternal Grandmother      C.A.D. No family hx of      Breast Cancer No family hx of      Cancer - colorectal No family hx of           Current Outpatient Prescriptions   Medication Sig Dispense Refill     labetalol (NORMODYNE) 100 MG tablet Take 1 tablet (100 mg) by mouth 2 times daily 60 tablet 3     Prenatal Vit-Fe Fumarate-FA (PRENATAL MULTIVITAMIN PLUS IRON) 27-0.8 MG TABS per tablet Take 1 tablet by mouth daily 100 tablet 3     fluticasone (FLONASE) 50 MCG/ACT spray Spray 1-2 sprays into both nostrils daily (Patient not taking: Reported on 10/17/2017) 16 g prn     fexofenadine (ALLEGRA) 180 MG tablet Take 1 tablet (180 mg) by mouth daily (Patient not taking: Reported on 10/17/2017) 30 tablet 1     azithromycin (ZITHROMAX) 250 MG tablet Two tablets first day, then one tablet daily for four days. (Patient not taking: Reported on 10/17/2017) 6 tablet 0         Reviewed and updated as needed this visit by clinical staffTobacco  Allergies  Meds  Soc Hx      Reviewed and updated as needed this visit by Provider         ROS:  C: NEGATIVE for fever, chills, change in weight  E/M: NEGATIVE for ear, mouth and throat problems  R: NEGATIVE for significant cough or SOB  CV: NEGATIVE for chest pain, palpitations or peripheral edema    OBJECTIVE:                                                    /90  Pulse 68  Temp 98.9  F (37.2  C) (Tympanic)  Wt 151 lb (68.5 kg)  LMP 09/05/2017 (Approximate)  BMI 27.62 kg/m2  Body mass index is 27.62 kg/(m^2).   GENERAL: healthy, alert, well nourished, well hydrated, no distress  RESP: lungs clear to auscultation - no rales, no rhonchi, no wheezes  CV: regular rates and rhythm, normal S1 S2, no S3 or S4 and no murmur, no click or rub -       ASSESSMENT/PLAN:                                                        ICD-10-CM    1. Missed period N92.6 Beta HCG qual IFA urine - [facility] and [facility]   2. Essential hypertension I10 labetalol (NORMODYNE) 100 MG tablet   3. HTN, goal below 140/90 I10 labetalol (NORMODYNE) 100 MG tablet   4. Pregnancy test positive Z32.01 Prenatal Vit-Fe Fumarate-FA (PRENATAL  MULTIVITAMIN PLUS IRON) 27-0.8 MG TABS per tablet     Patient has pregnancy test positive. Her last menstrual period is early last month.  She is advised to stop losartan which is not indicated rather contraindicated during pregnancy. she took last dose this morning.  I will switch her to labetalol 100 mg b.i.d. She is advised to check her blood pressure 2 times a day for the next 2-3 weeks. Any new or changing symptoms she should follow-up.  He is advised to make an appointment OB for consultation. Any abnormal bleeding or abdominal pain associated with that she should follow-up immediately.    Jacob Moy MD  [facility]

## 2017-10-17 NOTE — NURSING NOTE
"Chief Complaint   Patient presents with     Hypertension       Initial /90  Pulse 68  Temp 98.9  F (37.2  C) (Tympanic)  Wt 151 lb (68.5 kg)  LMP 09/05/2017 (Approximate)  BMI 27.62 kg/m2 Estimated body mass index is 27.62 kg/(m^2) as calculated from the following:    Height as of 7/12/17: 5' 2\" (1.575 m).    Weight as of this encounter: 151 lb (68.5 kg).  Medication Reconciliation: complete    Current Outpatient Prescriptions   Medication Sig Dispense Refill     losartan (COZAAR) 50 MG tablet Take 1 tablet (50 mg) by mouth daily 90 tablet 1     fluticasone (FLONASE) 50 MCG/ACT spray Spray 1-2 sprays into both nostrils daily (Patient not taking: Reported on 10/17/2017) 16 g prn     fexofenadine (ALLEGRA) 180 MG tablet Take 1 tablet (180 mg) by mouth daily (Patient not taking: Reported on 10/17/2017) 30 tablet 1     azithromycin (ZITHROMAX) 250 MG tablet Two tablets first day, then one tablet daily for four days. (Patient not taking: Reported on 10/17/2017) 6 tablet 0       Ethan M, CMA  "

## 2017-10-17 NOTE — MR AVS SNAPSHOT
After Visit Summary   10/17/2017    Juany Blandon    MRN: [id number]           Patient Information     Date Of Birth          1975        Visit Information        Provider Department      10/17/2017 11:40 AM Jacob Moy MD [facility] [place]        Today's Diagnoses     Missed period    -  1    Essential hypertension        HTN, goal below 140/90        Pregnancy test positive           Follow-ups after your visit        Who to contact     If you have questions or need follow up information about today's clinic visit or your schedule please contact [facility] [place] directly at [phone].  Normal or non-critical lab and imaging results will be communicated to you by MetroMilehart, letter or phone within 4 business days after the clinic has received the results. If you do not hear from us within 7 days, please contact the clinic through MetroMilehart or phone. If you have a critical or abnormal lab result, we will notify you by phone as soon as possible.  Submit refill requests through Codewise or call your pharmacy and they will forward the refill request to us. Please allow 3 business days for your refill to be completed.          Additional Information About Your Visit        MyChart Information     Codewise gives you secure access to your electronic health record. If you see a primary care provider, you can also send messages to your care team and make appointments. If you have questions, please call your primary care clinic.  If you do not have a primary care provider, please call [phone] and they will assist you.        Care EveryWhere ID     This is your Care EveryWhere ID. This could be used by other organizations to access your [facility] medical records  [id number]        Your Vitals Were     Pulse Temperature Last Period BMI (Body Mass Index)          68 98.9  F (37.2  C) (Tympanic) 09/05/2017 (Approximate) 27.62 kg/m2         Blood Pressure from Last 3 Encounters:    10/17/17 138/90   07/12/17 133/88   07/07/17 (!) 143/97    Weight from Last 3 Encounters:   10/17/17 151 lb (68.5 kg)   07/12/17 149 lb (67.6 kg)   07/07/17 147 lb (66.7 kg)              We Performed the Following     Beta HCG qual IFA urine - [facility] and [place]          Today's Medication Changes          These changes are accurate as of: 10/17/17 12:05 PM.  If you have any questions, ask your nurse or doctor.               Start taking these medicines.        Dose/Directions    labetalol 100 MG tablet   Commonly known as:  NORMODYNE   Used for:  Essential hypertension, HTN, goal below 140/90   Started by:  Jacob Moy MD        Dose:  100 mg   Take 1 tablet (100 mg) by mouth 2 times daily   Quantity:  60 tablet   Refills:  3       prenatal multivitamin plus iron 27-0.8 MG Tabs per tablet   Used for:  Pregnancy test positive   Started by:  Jacob Moy MD        Dose:  1 tablet   Take 1 tablet by mouth daily   Quantity:  100 tablet   Refills:  3         Stop taking these medicines if you haven't already. Please contact your care team if you have questions.     losartan 50 MG tablet   Commonly known as:  COZAAR   Stopped by:  Jacob Moy MD                Where to get your medicines      [id number] - [place], [place] - [address] AT [address]  [address], [place] [address]     Phone:  [phone]     labetalol 100 MG tablet    prenatal multivitamin plus iron 27-0.8 MG Tabs per tablet                Primary Care Provider Office Phone # Fax #    Korina Garcia [phone]       [address]  [place] [address]        Equal Access to Services     [facility] RO[facility] AH: Ralph Dill, wakatrinada luqadaha, qaybta kaalmada esmeryaberna, nettie gee. So [facility] [phone].    ATENCIÓN: Si habla español, tiene a jacques disposición servicios gratuitos de  asistencia lingüística. Kaz al [phone].    We comply with applicable federal civil rights laws and [place] laws. We do not discriminate on the basis of race, color, national origin, age, disability, sex, sexual orientation, or gender identity.            Thank you!     Thank you for choosing [facility] [place]  for your care. Our goal is always to provide you with excellent care. Hearing back from our patients is one way we can continue to improve our services. Please take a few minutes to complete the written survey that you may receive in the mail after your visit with us. Thank you!             Your Updated Medication List - Protect others around you: Learn how to safely use, store and throw away your medicines at www.disposemymeds.org.          This list is accurate as of: 10/17/17 12:05 PM.  Always use your most recent med list.                   Brand Name Dispense Instructions for use Diagnosis    azithromycin 250 MG tablet    ZITHROMAX    6 tablet    Two tablets first day, then one tablet daily for four days.    Other acute sinusitis       fexofenadine 180 MG tablet    ALLEGRA    30 tablet    Take 1 tablet (180 mg) by mouth daily    Nasal congestion, Seasonal allergic rhinitis due to other allergic trigger, unspecified chronicity       fluticasone 50 MCG/ACT spray    FLONASE    16 g    Spray 1-2 sprays into both nostrils daily    Nasal congestion, Seasonal allergic rhinitis due to other allergic trigger, unspecified chronicity       labetalol 100 MG tablet    NORMODYNE    60 tablet    Take 1 tablet (100 mg) by mouth 2 times daily    Essential hypertension, HTN, goal below 140/90       prenatal multivitamin plus iron 27-0.8 MG Tabs per tablet     100 tablet    Take 1 tablet by mouth daily    Pregnancy test positive

## 2018-01-31 ENCOUNTER — OFFICE VISIT (OUTPATIENT)
Dept: FAMILY MEDICINE | Facility: CLINIC | Age: 43
End: 2018-01-31
Payer: COMMERCIAL

## 2018-01-31 VITALS
DIASTOLIC BLOOD PRESSURE: 80 MMHG | WEIGHT: 154 LBS | HEIGHT: 62 IN | HEART RATE: 78 BPM | TEMPERATURE: 98.4 F | BODY MASS INDEX: 28.34 KG/M2 | SYSTOLIC BLOOD PRESSURE: 128 MMHG | OXYGEN SATURATION: 100 %

## 2018-01-31 DIAGNOSIS — M79.644 PAIN OF FINGER OF RIGHT HAND: ICD-10-CM

## 2018-01-31 DIAGNOSIS — R20.0 FINGER NUMBNESS: Primary | ICD-10-CM

## 2018-01-31 PROCEDURE — 99213 OFFICE O/P EST LOW 20 MIN: CPT | Performed by: FAMILY MEDICINE

## 2018-01-31 RX ORDER — NAPROXEN 500 MG/1
500 TABLET ORAL 2 TIMES DAILY WITH MEALS
Qty: 30 TABLET | Refills: 0 | Status: SHIPPED | OUTPATIENT
Start: 2018-01-31 | End: 2018-02-28

## 2018-01-31 NOTE — PROGRESS NOTES
SUBJECTIVE:   Juany Blandon is a 42 year old female who presents to clinic today for the following health issues:      Musculoskeletal problem/Hand pain      Duration: x 2 weeks     Description  Location: left hand/fingers , her finger middle and ring finger just feels weak and numb achy feeling, does not hurt to use and too much strenuous for a week    Intensity:  moderate    Accompanying signs and symptoms: Aching and weakness of fingers mostly middle and ring finger , no swelling no redness. No recall of any strain and injury. no wrist pain neck pain or shoulder pain etc.    History  Previous similar problem: no   Previous evaluation:  none    Precipitating or alleviating factors:  Trauma or overuse: no , computers all the time but nothing new   Aggravating factors include: none    Therapies tried and outcome: nothing      PROBLEMS TO ADD ON...    Problem list and histories reviewed & adjusted, as indicated.  Additional history: as documented    Patient Active Problem List   Diagnosis     JOINT PAIN-MULT JDEANGELO, W/U NEG ~     Essential hypertension     CARDIOVASCULAR SCREENING; LDL GOAL LESS THAN 160     Former smoker     Lumbar radiculopathy     HTN, goal below 140/90     Gastroesophageal reflux disease, esophagitis presence not specified     Seasonal allergic rhinitis     Essential hypertension with goal blood pressure less than 140/90     BMI 27.0-27.9,adult     Past Surgical History:   Procedure Laterality Date     C NONSPECIFIC PROCEDURE      L BREAST BX, bening 2014       SECTION             Social History   Substance Use Topics     Smoking status: Former Smoker     Packs/day: 0.50     Types: Cigarettes     Quit date: 2010     Smokeless tobacco: Never Used     Alcohol use No     Family History   Problem Relation Age of Onset     Blood Disease Father      non higd lymphoma     Gynecology Mother      fibroids     Lipids Mother      Hypertension Mother      DIABETES Maternal  "Grandmother      Arthritis Paternal Grandmother      C.A.D. No family hx of      Breast Cancer No family hx of      Cancer - colorectal No family hx of            Reviewed and updated as needed this visit by clinical staff       Reviewed and updated as needed this visit by Provider         ROS:  Constitutional, HEENT, cardiovascular, pulmonary, GI, , musculoskeletal, neuro, skin, endocrine and psych systems are negative, except as otherwise noted.    OBJECTIVE:     BP (!) 134/92  Pulse 78  Temp 98.4  F (36.9  C) (Tympanic)  Ht 5' 2\" (1.575 m)  Wt 154 lb (69.9 kg)  LMP 01/09/2018  SpO2 100%  BMI 28.17 kg/m2  Body mass index is 28.17 kg/(m^2).  GENERAL: healthy, alert and no distress  RESP: lungs clear to auscultation - no rales, rhonchi or wheezes  CV: regular rate and rhythm, normal S1 S2, no S3 or S4,   MS: Wrist normal range of motion and no tenderness.  Her most of the fingers to nontender to palpation except  mostly PIP joints of right middle and ring finger slightly uncomfortable to palpation just feels sore for, range of motion aggravates slight  pain against resistance especially  NEURO: Normal strength and tone, sensation intact with monofilament        ASSESSMENT/PLAN:       (R20.0) Finger numbness  (primary encounter diagnosis)  Comment: rt middle/ ring finger   Plan: naproxen (NAPROSYN) 500 MG tablet            (M79.644) Pain of finger of right hand  Comment: middle finger ,ring finger   Plan: naproxen (NAPROSYN) 500 MG tablet     Likely strain or maybe early tendinitis she is comfortable taking the pain medication. Cares and symptomatic treatment discussed follow up if problem     Patient expressed understanding and agreement with treatment plan. All patient's questions were answered, will let me know if has more later.  Medications: Rx's: Reviewed the potential side effects/complications of medications prescribed.       Korina Garcia MD  [facility] [place]    "

## 2018-01-31 NOTE — NURSING NOTE
"Chief Complaint   Patient presents with     Musculoskeletal Problem       Initial BP (!) 134/92  Pulse 78  Temp 98.4  F (36.9  C) (Tympanic)  Ht 5' 2\" (1.575 m)  Wt 154 lb (69.9 kg)  LMP 01/09/2018  SpO2 100%  BMI 28.17 kg/m2 Estimated body mass index is 28.17 kg/(m^2) as calculated from the following:    Height as of this encounter: 5' 2\" (1.575 m).    Weight as of this encounter: 154 lb (69.9 kg).  Medication Reconciliation: complete  "

## 2018-01-31 NOTE — MR AVS SNAPSHOT
After Visit Summary   1/31/2018    Juany Blandon    MRN: [id number]           Patient Information     Date Of Birth          1975        Visit Information        Provider Department      1/31/2018 9:00 AM Korina Garcia MD [facility] [place]        Today's Diagnoses     Finger numbness    -  1    Pain of finger of right hand          Care Instructions    Take medications as directed.  Treatment  and symptomatic cares discussed   Follow up if problem or concern             Follow-ups after your visit        Follow-up notes from your care team     Return if symptoms worsen or fail to improve, for Physical Exam in june 2018 .      Who to contact     If you have questions or need follow up information about today's clinic visit or your schedule please contact [facility] [place] directly at [phone].  Normal or non-critical lab and imaging results will be communicated to you by MyChart, letter or phone within 4 business days after the clinic has received the results. If you do not hear from us within 7 days, please contact the clinic through MyChart or phone. If you have a critical or abnormal lab result, we will notify you by phone as soon as possible.  Submit refill requests through [employer] or call your pharmacy and they will forward the refill request to us. Please allow 3 business days for your refill to be completed.          Additional Information About Your Visit        MyChart Information     [employer] gives you secure access to your electronic health record. If you see a primary care provider, you can also send messages to your care team and make appointments. If you have questions, please call your primary care clinic.  If you do not have a primary care provider, please call [phone] and they will assist you.        Care EveryWhere ID     This is your Care EveryWhere ID. This could be used by other organizations to access your [facility]  "records  [id number]        Your Vitals Were     Pulse Temperature Height Last Period Pulse Oximetry BMI (Body Mass Index)    78 98.4  F (36.9  C) (Tympanic) 5' 2\" (1.575 m) 01/09/2018 100% 28.17 kg/m2       Blood Pressure from Last 3 Encounters:   01/31/18 128/80   10/17/17 138/90   07/12/17 133/88    Weight from Last 3 Encounters:   01/31/18 154 lb (69.9 kg)   10/17/17 151 lb (68.5 kg)   07/12/17 149 lb (67.6 kg)              Today, you had the following     No orders found for display         Today's Medication Changes          These changes are accurate as of 1/31/18  9:54 AM.  If you have any questions, ask your nurse or doctor.               Start taking these medicines.        Dose/Directions    naproxen 500 MG tablet   Commonly known as:  NAPROSYN   Used for:  Finger numbness, Pain of finger of right hand   Started by:  Korina Garcia MD        Dose:  500 mg   Take 1 tablet (500 mg) by mouth 2 times daily (with meals)   Quantity:  30 tablet   Refills:  0            Where to get your medicines      These medications were sent to [id number] - [place], [place] - [address] AT [address]  [address], [place] [address]     Phone:  [phone]     naproxen 500 MG tablet                Primary Care Provider Office Phone # Fax #    Korina Garcia [phone]        [address]  [place] [address]        Equal Access to Services     ABBEY COURTNEY AH: Hadii vanessa jay Sowaqas, waaxda luqadaha, qaybta kaalmanettie staley. So [facility] [phone].    ATENCIÓN: Si habla español, tiene a jacques disposición servicios gratuitos de asistencia lingüística. Llame al [phone].    We comply with applicable federal civil rights laws and [place] laws. We do not discriminate on the basis of race, color, national origin, age, disability, sex, sexual orientation, or gender " identity.            Thank you!     Thank you for choosing [facility] [place]  for your care. Our goal is always to provide you with excellent care. Hearing back from our patients is one way we can continue to improve our services. Please take a few minutes to complete the written survey that you may receive in the mail after your visit with us. Thank you!             Your Updated Medication List - Protect others around you: Learn how to safely use, store and throw away your medicines at www.disposemymeds.org.          This list is accurate as of 1/31/18  9:54 AM.  Always use your most recent med list.                   Brand Name Dispense Instructions for use Diagnosis    labetalol 100 MG tablet    NORMODYNE    60 tablet    Take 1 tablet (100 mg) by mouth 2 times daily    Essential hypertension, HTN, goal below 140/90       naproxen 500 MG tablet    NAPROSYN    30 tablet    Take 1 tablet (500 mg) by mouth 2 times daily (with meals)    Finger numbness, Pain of finger of right hand       prenatal multivitamin plus iron 27-0.8 MG Tabs per tablet     100 tablet    Take 1 tablet by mouth daily    Pregnancy test positive

## 2018-02-24 ENCOUNTER — HOSPITAL ENCOUNTER (EMERGENCY)
Facility: CLINIC | Age: 43
Discharge: HOME | End: 2018-02-24
Attending: EMERGENCY MEDICINE | Admitting: EMERGENCY MEDICINE
Payer: COMMERCIAL

## 2018-02-24 VITALS
SYSTOLIC BLOOD PRESSURE: 146 MMHG | HEART RATE: 75 BPM | OXYGEN SATURATION: 98 % | WEIGHT: 152 LBS | DIASTOLIC BLOOD PRESSURE: 95 MMHG | BODY MASS INDEX: 27.97 KG/M2 | HEIGHT: 62 IN | RESPIRATION RATE: 20 BRPM | TEMPERATURE: 98.6 F

## 2018-02-24 DIAGNOSIS — I10 BENIGN ESSENTIAL HYPERTENSION: ICD-10-CM

## 2018-02-24 LAB
ANION GAP SERPL CALCULATED.3IONS-SCNC: 8 MMOL/L (ref 3–14)
BASOPHILS # BLD AUTO: 0 10E9/L (ref 0–0.2)
BASOPHILS NFR BLD AUTO: 0.1 %
BUN SERPL-MCNC: 16 MG/DL (ref 7–30)
CALCIUM SERPL-MCNC: 8.1 MG/DL (ref 8.5–10.1)
CHLORIDE SERPL-SCNC: 107 MMOL/L (ref 94–109)
CO2 SERPL-SCNC: 25 MMOL/L (ref 20–32)
CREAT SERPL-MCNC: 0.94 MG/DL (ref 0.52–1.04)
DIFFERENTIAL METHOD BLD: NORMAL
EOSINOPHIL # BLD AUTO: 0.2 10E9/L (ref 0–0.7)
EOSINOPHIL NFR BLD AUTO: 2.7 %
ERYTHROCYTE [DISTWIDTH] IN BLOOD BY AUTOMATED COUNT: 12.8 % (ref 10–15)
GFR SERPL CREATININE-BSD FRML MDRD: 66 ML/MIN/1.7M2
GLUCOSE SERPL-MCNC: 90 MG/DL (ref 70–99)
HCT VFR BLD AUTO: 35.6 % (ref 35–47)
HGB BLD-MCNC: 12.3 G/DL (ref 11.7–15.7)
IMM GRANULOCYTES # BLD: 0 10E9/L (ref 0–0.4)
IMM GRANULOCYTES NFR BLD: 0.1 %
INTERPRETATION ECG - MUSE: NORMAL
LYMPHOCYTES # BLD AUTO: 2.8 10E9/L (ref 0.8–5.3)
LYMPHOCYTES NFR BLD AUTO: 37.1 %
MCH RBC QN AUTO: 30.8 PG (ref 26.5–33)
MCHC RBC AUTO-ENTMCNC: 34.6 G/DL (ref 31.5–36.5)
MCV RBC AUTO: 89 FL (ref 78–100)
MONOCYTES # BLD AUTO: 0.5 10E9/L (ref 0–1.3)
MONOCYTES NFR BLD AUTO: 6.1 %
NEUTROPHILS # BLD AUTO: 4 10E9/L (ref 1.6–8.3)
NEUTROPHILS NFR BLD AUTO: 53.9 %
NRBC # BLD AUTO: 0 10*3/UL
NRBC BLD AUTO-RTO: 0 /100
PLATELET # BLD AUTO: 230 10E9/L (ref 150–450)
POTASSIUM SERPL-SCNC: 3.7 MMOL/L (ref 3.4–5.3)
RBC # BLD AUTO: 3.99 10E12/L (ref 3.8–5.2)
SODIUM SERPL-SCNC: 140 MMOL/L (ref 133–144)
TROPONIN I SERPL-MCNC: <0.015 UG/L (ref 0–0.04)
WBC # BLD AUTO: 7.4 10E9/L (ref 4–11)

## 2018-02-24 PROCEDURE — 85025 COMPLETE CBC W/AUTO DIFF WBC: CPT | Performed by: EMERGENCY MEDICINE

## 2018-02-24 PROCEDURE — 80048 BASIC METABOLIC PNL TOTAL CA: CPT | Performed by: EMERGENCY MEDICINE

## 2018-02-24 PROCEDURE — 99284 EMERGENCY DEPT VISIT MOD MDM: CPT

## 2018-02-24 PROCEDURE — 84484 ASSAY OF TROPONIN QUANT: CPT | Performed by: EMERGENCY MEDICINE

## 2018-02-24 PROCEDURE — 93005 ELECTROCARDIOGRAM TRACING: CPT

## 2018-02-24 PROCEDURE — 25000132 ZZH RX MED GY IP 250 OP 250 PS 637: Performed by: EMERGENCY MEDICINE

## 2018-02-24 RX ORDER — ASPIRIN 81 MG/1
162 TABLET, CHEWABLE ORAL ONCE
Status: COMPLETED | OUTPATIENT
Start: 2018-02-24 | End: 2018-02-24

## 2018-02-24 RX ADMIN — ASPIRIN 81 MG 162 MG: 81 TABLET ORAL at 21:50

## 2018-02-24 NOTE — ED AVS SNAPSHOT
Emergency Department    [address] [place] [address]    Phone:  [phone]    Fax:  [phone]                                       Juany Blandon   MRN: [id number]    Department:   Emergency Department   Date of Visit:  2/24/2018           After Visit Summary Signature Page     I have received my discharge instructions, and my questions have been answered. I have discussed any challenges I see with this plan with the nurse or doctor.    ..........................................................................................................................................  Patient/Patient Representative Signature      ..........................................................................................................................................  Patient Representative Print Name and Relationship to Patient    ..................................................               ................................................  Date                                            Time    ..........................................................................................................................................  Reviewed by Signature/Title    ...................................................              ..............................................  Date                                                            Time

## 2018-02-24 NOTE — ED AVS SNAPSHOT
Emergency Department    [address] [facility] [address]    Phone:  [phone]    Fax:  [phone]                                       Juany Blandon   MRN: [id number]    Department:   Emergency Department   Date of Visit:  2/24/2018           Patient Information     Date Of Birth          1975        Your diagnoses for this visit were:     Benign essential hypertension        You were seen by Mohit Tabares MD.      Follow-up Information     Follow up with Korina Garcia MD. Schedule an appointment as soon as possible for a visit in 1 week.    Specialty:  Family Practice    Why:  For repeat evaluation and symptom check and possible stress test     Contact information:    [address] [place] [address] [address]  [place] [id number] [phone]          Follow up with  Emergency Department.    Specialty:  EMERGENCY MEDICINE    Why:  If symptoms worsen    Contact information:    [address] [employer] [address] [address] [phone]        Discharge Instructions         Taking Your Blood Pressure  Blood pressure is the force of blood against the artery wall as it moves from the heart through the blood vessels. You can take your own blood pressure reading using a digital monitor. Take your readings the same each time, using the same arm. Take readings as often as your healthcare provider instructs.  About blood pressure monitors  Blood pressure monitors are designed for certain ages and cases. You can find monitors for older adults, for pregnant women, and for children. Make sure the one you choose is the right one for your age and situation.  The American Heart Association recommends an automatic cuff monitor that fits on your upper arm (bicep). The cuff should fit your arm size. A cuff that s too large or too small will not give an accurate reading. Measure around your upper arm to find your size.  Monitors that attach to your finger or wrist are not  as accurate as monitors for your upper arm.  Ask your healthcare provider for help in choosing a monitor. Bring your monitor to your next provider visit if you need help in using it the correct way.  The steps below are general instructions for using an automatic digital monitor.  Step 1. Relax      Take your blood pressure at the same time every day, such as in the morning or evening, or at the time your healthcare provider recommends.    Wait at least a half-hour after smoking, eating, or exercising. Don't drink coffee, tea, soda, or other caffeinated beverages before checking your blood pressure.    Sit comfortably at a table with both feet on the floor. Do not cross your legs or feet. Place the monitor near you.    Rest for a few minutes before you begin.  Step 2. Wrap the cuff      Place your arm on the table, palm up. Your arm should be at the level of your heart. Wrap the cuff around your upper arm, just above your elbow. It s best done on bare skin, not over clothing. Most cuffs will indicate where the brachial artery (the blood vessel in the middle of the arm at the inner side of the elbow) should line up with the cuff. Look in your monitor's instruction booklet for an illustration. You can also bring your cuff to your healthcare provider and have them show you how to correctly place the cuff.  Step 3. Inflate the cuff      Push the button that starts the pump.    The cuff will tighten, then loosen.    The numbers will change. When they stop changing, your blood pressure reading will appear.    Take 2 or 3 readings one minute apart.  Step 4. Write down the results of each reading      Write down your blood pressure numbers for each reading. Note the date and time. Keep your results in one place, such as a notebook. Even if your monitor has a built-in memory, keep a hard copy of the readings.    Remove the cuff from your arm. Turn off the machine.    Bring your blood pressure records with your healthcare  providers at each visit.    If you start a new blood pressure medicine, note the day you started the new medicine. Also note the day if you change the dose of your medicine. This information goes on your blood pressure recording sheet. This will help your healthcare provider monitor how well the medicine changes are working.    Ask your healthcare provider what numbers should prompt you to call him or her. Also ask what numbers should prompt you to get help right away.  Date Last Reviewed: 11/1/2016 2000-2017 [employer]. [address] [place] [address]. All rights reserved. This information is not intended as a substitute for professional medical care. Always follow your healthcare professional's instructions.      Discharge Instructions  Chest Pain    You have been seen today for chest pain or discomfort.  At this time, your doctor has found no signs that your chest pain is due to a serious or life-threatening condition, (or you have declined more testing and/or admission to the hospital). However, sometimes there is a serious problem that does not show up right away. Your evaluation today may not be complete and you may need further testing and evaluation.     You need to follow-up with your regular doctor within 3 days.    Return to the Emergency Department if:    Your chest pain changes, gets worse, starts to happen more often, or comes with less activity.    You are short of breath.    You get very weak or tired.    You pass out or faint.    You have any new symptoms, like fever, cough, numb legs, or you cough up blood.    You have anything else that worries you.    Until you follow-up with your regular doctor please do the following:    Take one aspirin daily unless you have an allergy or are told not to by your doctor.    If a stress test appointment has been made, go to the appointment.    If you have questions, contact your regular doctor.    If your doctor today has told you to  follow-up with your regular doctor, it is very important that you make an appointment with your clinic and go to the appointment.  If you do not follow-up with your primary doctor, it may result in missing an important development which could result in permanent injury or disability and/or lasting pain.  If there is any problem keeping your appointment, call your doctor or return to the Emergency Department.    If you were given a prescription for medicine here today, be sure to read all of the information (including the package insert) that comes with your prescription.  This will include important information about the medicine, its side effects, and any warnings that you need to know about.  The pharmacist who fills the prescription can provide more information and answer questions you may have about the medicine.  If you have questions or concerns that the pharmacist cannot address, please call or return to the Emergency Department.     Opioid Medication Information    Pain medications are among the most commonly prescribed medicines, so we are including this information for all our patients. If you did not receive pain medication or get a prescription for pain medicine, you can ignore it.     You may have been given a prescription for an opioid (narcotic) pain medicine and/or have received a pain medicine while here in the Emergency Department. These medicines can make you drowsy or impaired. You must not drive, operate dangerous equipment, or engage in any other dangerous activities while taking these medications. If you drive while taking these medications, you could be arrested for DUI, or driving under the influence. Do not drink any alcohol while you are taking these medications.     Opioid pain medications can cause addiction. If you have a history of chemical dependency of any type, you are at a higher risk of becoming addicted to pain medications.  Only take these prescribed medications to treat your  pain when all other options have been tried. Take it for as short a time and as few doses as possible. Store your pain pills in a secure place, as they are frequently stolen and provide a dangerous opportunity for children or visitors in your house to start abusing these powerful medications. We will not replace any lost or stolen medicine.  As soon as your pain is better, you should flush all your remaining medication.     Many prescription pain medications contain Tylenol  (acetaminophen), including Vicodin , Tylenol #3 , Norco , Lortab , and Percocet .  You should not take any extra pills of Tylenol  if you are using these prescription medications or you can get very sick.  Do not ever take more than 3000 mg of acetaminophen in any 24 hour period.    All opioids tend to cause constipation. Drink plenty of water and eat foods that have a lot of fiber, such as fruits, vegetables, prune juice, apple juice and high fiber cereal.  Take a laxative if you don t move your bowels at least every other day. Miralax , Milk of Magnesia, Colace , or Senna  can be used to keep you regular.      Remember that you can always come back to the Emergency Department if you are not able to see your regular doctor in the amount of time listed above, if you get any new symptoms, or if there is anything that worries you.          24 Hour Appointment Hotline       To make an appointment at any [facility], call [phone] ([phone]). If you don't have a family doctor or clinic, we will help you find one. [facility] clinics are conveniently located to serve the needs of you and your family.             Review of your medicines      Our records show that you are taking the medicines listed below. If these are incorrect, please call your family doctor or clinic.        Dose / Directions Last dose taken    labetalol 100 MG tablet   Commonly known as:  NORMODYNE   Quantity:  60 tablet        TAKE 1 TABLET(100 MG) BY MOUTH TWICE  DAILY   Refills:  3        naproxen 500 MG tablet   Commonly known as:  NAPROSYN   Dose:  500 mg   Quantity:  30 tablet        Take 1 tablet (500 mg) by mouth 2 times daily (with meals)   Refills:  0        prenatal multivitamin plus iron 27-0.8 MG Tabs per tablet   Dose:  1 tablet   Quantity:  100 tablet        Take 1 tablet by mouth daily   Refills:  3                Procedures and tests performed during your visit     Basic metabolic panel    CBC with platelets differential    EKG 12-lead, tracing only    Troponin I      Orders Needing Specimen Collection     None      Pending Results     No orders found from 2/22/2018 to 2/25/2018.            Pending Culture Results     No orders found from 2/22/2018 to 2/25/2018.            Pending Results Instructions     If you had any lab results that were not finalized at the time of your Discharge, you can call the ED Lab Result RN at [phone]. You will be contacted by this team for any positive Lab results or changes in treatment. The nurses are available 7 days a week from 10A to 6:30P.  You can leave a message 24 hours per day and they will return your call.        Test Results From Your Hospital Stay        2/24/2018  9:59 PM      Component Results     Component Value Ref Range & Units Status    WBC 7.4 4.0 - 11.0 10e9/L Final    RBC Count 3.99 3.8 - 5.2 10e12/L Final    Hemoglobin 12.3 11.7 - 15.7 g/dL Final    Hematocrit 35.6 35.0 - 47.0 % Final    MCV 89 78 - 100 fl Final    MCH 30.8 26.5 - 33.0 pg Final    MCHC 34.6 31.5 - 36.5 g/dL Final    RDW 12.8 10.0 - 15.0 % Final    Platelet Count 230 150 - 450 10e9/L Final    Diff Method Automated Method  Final    % Neutrophils 53.9 % Final    % Lymphocytes 37.1 % Final    % Monocytes 6.1 % Final    % Eosinophils 2.7 % Final    % Basophils 0.1 % Final    % Immature Granulocytes 0.1 % Final    Nucleated RBCs 0 0 /100 Final    Absolute Neutrophil 4.0 1.6 - 8.3 10e9/L Final    Absolute Lymphocytes 2.8 0.8 - 5.3 10e9/L  Final    Absolute Monocytes 0.5 0.0 - 1.3 10e9/L Final    Absolute Eosinophils 0.2 0.0 - 0.7 10e9/L Final    Absolute Basophils 0.0 0.0 - 0.2 10e9/L Final    Abs Immature Granulocytes 0.0 0 - 0.4 10e9/L Final    Absolute Nucleated RBC 0.0  Final         2/24/2018 10:15 PM      Component Results     Component Value Ref Range & Units Status    Sodium 140 133 - 144 mmol/L Final    Potassium 3.7 3.4 - 5.3 mmol/L Final    Chloride 107 94 - 109 mmol/L Final    Carbon Dioxide 25 20 - 32 mmol/L Final    Anion Gap 8 3 - 14 mmol/L Final    Glucose 90 70 - 99 mg/dL Final    Urea Nitrogen 16 7 - 30 mg/dL Final    Creatinine 0.94 0.52 - 1.04 mg/dL Final    GFR Estimate 66 >60 mL/min/1.7m2 Final    Non  GFR Calc    GFR Estimate If Black 79 >60 mL/min/1.7m2 Final    African American GFR Calc    Calcium 8.1 (L) 8.5 - 10.1 mg/dL Final         2/24/2018 10:18 PM      Component Results     Component Value Ref Range & Units Status    Troponin I ES <0.015 0.000 - 0.045 ug/L Final    The 99th percentile for upper reference range is 0.045 ug/L.  Troponin values   in the range of 0.045 - 0.120 ug/L may be associated with risks of adverse   clinical events.                  Clinical Quality Measure: Blood Pressure Screening     Your blood pressure was checked while you were in the emergency department today. The last reading we obtained was  BP: (!) 146/95 . Please read the guidelines below about what these numbers mean and what you should do about them.  If your systolic blood pressure (the top number) is less than 120 and your diastolic blood pressure (the bottom number) is less than 80, then your blood pressure is normal. There is nothing more that you need to do about it.  If your systolic blood pressure (the top number) is 120-139 or your diastolic blood pressure (the bottom number) is 80-89, your blood pressure may be higher than it should be. You should have your blood pressure rechecked within a year by a primary  care provider.  If your systolic blood pressure (the top number) is 140 or greater or your diastolic blood pressure (the bottom number) is 90 or greater, you may have high blood pressure. High blood pressure is treatable, but if left untreated over time it can put you at risk for heart attack, stroke, or kidney failure. You should have your blood pressure rechecked by a primary care provider within the next 4 weeks.  If your provider in the emergency department today gave you specific instructions to follow-up with your doctor or provider even sooner than that, you should follow that instruction and not wait for up to 4 weeks for your follow-up visit.        Thank you for choosing [facility]       Thank you for choosing [facility] for your care. Our goal is always to provide you with excellent care. Hearing back from our patients is one way we can continue to improve our services. Please take a few minutes to complete the written survey that you may receive in the mail after you visit with us. Thank you!        [employer] Information     [employer] gives you secure access to your electronic health record. If you see a primary care provider, you can also send messages to your care team and make appointments. If you have questions, please call your primary care clinic.  If you do not have a primary care provider, please call [phone] and they will assist you.        Care EveryWhere ID     This is your Care EveryWhere ID. This could be used by other organizations to access your [facility] medical records  [id number]        Equal Access to Services     MELISSA COURTNEY : Hadii vanessa Dill, wafazal walden, qaybnettie duenas. So [facility] [place] [phone].    ATENCIÓN: Si habla español, tiene a jacques disposición servicios gratuitos de asistencia lingüística. Llame al [phone].    We comply with applicable federal civil rights laws and [place] laws. We do not discriminate on  the basis of race, color, national origin, age, disability, sex, sexual orientation, or gender identity.            After Visit Summary       This is your record. Keep this with you and show to your community pharmacist(s) and doctor(s) at your next visit.

## 2018-02-25 ENCOUNTER — APPOINTMENT (OUTPATIENT)
Dept: MRI IMAGING | Facility: CLINIC | Age: 43
End: 2018-02-25
Attending: EMERGENCY MEDICINE
Payer: COMMERCIAL

## 2018-02-25 ENCOUNTER — HOSPITAL ENCOUNTER (EMERGENCY)
Facility: CLINIC | Age: 43
Discharge: HOME | End: 2018-02-25
Attending: EMERGENCY MEDICINE | Admitting: EMERGENCY MEDICINE
Payer: COMMERCIAL

## 2018-02-25 VITALS
HEART RATE: 82 BPM | HEIGHT: 62 IN | SYSTOLIC BLOOD PRESSURE: 161 MMHG | OXYGEN SATURATION: 98 % | RESPIRATION RATE: 18 BRPM | WEIGHT: 160 LBS | TEMPERATURE: 98 F | BODY MASS INDEX: 29.44 KG/M2 | DIASTOLIC BLOOD PRESSURE: 110 MMHG

## 2018-02-25 DIAGNOSIS — R20.2 FACIAL PARESTHESIA: ICD-10-CM

## 2018-02-25 DIAGNOSIS — I10 ESSENTIAL HYPERTENSION, BENIGN: ICD-10-CM

## 2018-02-25 LAB
ANION GAP SERPL CALCULATED.3IONS-SCNC: 6 MMOL/L (ref 3–14)
BASOPHILS # BLD AUTO: 0 10E9/L (ref 0–0.2)
BASOPHILS NFR BLD AUTO: 0.1 %
BUN SERPL-MCNC: 16 MG/DL (ref 7–30)
CALCIUM SERPL-MCNC: 8.3 MG/DL (ref 8.5–10.1)
CHLORIDE SERPL-SCNC: 107 MMOL/L (ref 94–109)
CO2 SERPL-SCNC: 27 MMOL/L (ref 20–32)
CREAT SERPL-MCNC: 0.97 MG/DL (ref 0.52–1.04)
DIFFERENTIAL METHOD BLD: NORMAL
EOSINOPHIL # BLD AUTO: 0.2 10E9/L (ref 0–0.7)
EOSINOPHIL NFR BLD AUTO: 2.3 %
ERYTHROCYTE [DISTWIDTH] IN BLOOD BY AUTOMATED COUNT: 12.8 % (ref 10–15)
GFR SERPL CREATININE-BSD FRML MDRD: 63 ML/MIN/1.7M2
GLUCOSE SERPL-MCNC: 95 MG/DL (ref 70–99)
HCT VFR BLD AUTO: 36.3 % (ref 35–47)
HGB BLD-MCNC: 12.7 G/DL (ref 11.7–15.7)
IMM GRANULOCYTES # BLD: 0 10E9/L (ref 0–0.4)
IMM GRANULOCYTES NFR BLD: 0.1 %
LYMPHOCYTES # BLD AUTO: 2.4 10E9/L (ref 0.8–5.3)
LYMPHOCYTES NFR BLD AUTO: 32.9 %
MCH RBC QN AUTO: 31.2 PG (ref 26.5–33)
MCHC RBC AUTO-ENTMCNC: 35 G/DL (ref 31.5–36.5)
MCV RBC AUTO: 89 FL (ref 78–100)
MONOCYTES # BLD AUTO: 0.4 10E9/L (ref 0–1.3)
MONOCYTES NFR BLD AUTO: 5.4 %
NEUTROPHILS # BLD AUTO: 4.3 10E9/L (ref 1.6–8.3)
NEUTROPHILS NFR BLD AUTO: 59.2 %
NRBC # BLD AUTO: 0 10*3/UL
NRBC BLD AUTO-RTO: 0 /100
PLATELET # BLD AUTO: 227 10E9/L (ref 150–450)
POTASSIUM SERPL-SCNC: 3.9 MMOL/L (ref 3.4–5.3)
RBC # BLD AUTO: 4.07 10E12/L (ref 3.8–5.2)
SODIUM SERPL-SCNC: 140 MMOL/L (ref 133–144)
TROPONIN I SERPL-MCNC: <0.015 UG/L (ref 0–0.04)
WBC # BLD AUTO: 7.3 10E9/L (ref 4–11)

## 2018-02-25 PROCEDURE — 70553 MRI BRAIN STEM W/O & W/DYE: CPT

## 2018-02-25 PROCEDURE — 25000128 H RX IP 250 OP 636: Performed by: EMERGENCY MEDICINE

## 2018-02-25 PROCEDURE — 85025 COMPLETE CBC W/AUTO DIFF WBC: CPT | Performed by: EMERGENCY MEDICINE

## 2018-02-25 PROCEDURE — 84484 ASSAY OF TROPONIN QUANT: CPT | Performed by: EMERGENCY MEDICINE

## 2018-02-25 PROCEDURE — 80048 BASIC METABOLIC PNL TOTAL CA: CPT | Performed by: EMERGENCY MEDICINE

## 2018-02-25 PROCEDURE — 99285 EMERGENCY DEPT VISIT HI MDM: CPT | Mod: 25

## 2018-02-25 PROCEDURE — A9585 GADOBUTROL INJECTION: HCPCS | Performed by: EMERGENCY MEDICINE

## 2018-02-25 RX ORDER — GADOBUTROL 604.72 MG/ML
7 INJECTION INTRAVENOUS ONCE
Status: COMPLETED | OUTPATIENT
Start: 2018-02-25 | End: 2018-02-25

## 2018-02-25 RX ADMIN — GADOBUTROL 7 ML: 604.72 INJECTION INTRAVENOUS at 23:03

## 2018-02-25 ASSESSMENT — ENCOUNTER SYMPTOMS
HEADACHES: 1
NUMBNESS: 1
ROS GI COMMENTS: HYPERTENSION

## 2018-02-25 NOTE — DISCHARGE INSTRUCTIONS
Taking Your Blood Pressure  Blood pressure is the force of blood against the artery wall as it moves from the heart through the blood vessels. You can take your own blood pressure reading using a digital monitor. Take your readings the same each time, using the same arm. Take readings as often as your healthcare provider instructs.  About blood pressure monitors  Blood pressure monitors are designed for certain ages and cases. You can find monitors for older adults, for pregnant women, and for children. Make sure the one you choose is the right one for your age and situation.  The American Heart Association recommends an automatic cuff monitor that fits on your upper arm (bicep). The cuff should fit your arm size. A cuff that s too large or too small will not give an accurate reading. Measure around your upper arm to find your size.  Monitors that attach to your finger or wrist are not as accurate as monitors for your upper arm.  Ask your healthcare provider for help in choosing a monitor. Bring your monitor to your next provider visit if you need help in using it the correct way.  The steps below are general instructions for using an automatic digital monitor.  Step 1. Relax      Take your blood pressure at the same time every day, such as in the morning or evening, or at the time your healthcare provider recommends.    Wait at least a half-hour after smoking, eating, or exercising. Don't drink coffee, tea, soda, or other caffeinated beverages before checking your blood pressure.    Sit comfortably at a table with both feet on the floor. Do not cross your legs or feet. Place the monitor near you.    Rest for a few minutes before you begin.  Step 2. Wrap the cuff      Place your arm on the table, palm up. Your arm should be at the level of your heart. Wrap the cuff around your upper arm, just above your elbow. It s best done on bare skin, not over clothing. Most cuffs will indicate where the brachial artery (the  blood vessel in the middle of the arm at the inner side of the elbow) should line up with the cuff. Look in your monitor's instruction booklet for an illustration. You can also bring your cuff to your healthcare provider and have them show you how to correctly place the cuff.  Step 3. Inflate the cuff      Push the button that starts the pump.    The cuff will tighten, then loosen.    The numbers will change. When they stop changing, your blood pressure reading will appear.    Take 2 or 3 readings one minute apart.  Step 4. Write down the results of each reading      Write down your blood pressure numbers for each reading. Note the date and time. Keep your results in one place, such as a notebook. Even if your monitor has a built-in memory, keep a hard copy of the readings.    Remove the cuff from your arm. Turn off the machine.    Bring your blood pressure records with your healthcare providers at each visit.    If you start a new blood pressure medicine, note the day you started the new medicine. Also note the day if you change the dose of your medicine. This information goes on your blood pressure recording sheet. This will help your healthcare provider monitor how well the medicine changes are working.    Ask your healthcare provider what numbers should prompt you to call him or her. Also ask what numbers should prompt you to get help right away.  Date Last Reviewed: 11/1/2016 2000-2017 [employer] [address] [place] [address], [place] [address]. All rights reserved. This information is not intended as a substitute for professional medical care. Always follow your healthcare professional's instructions.      Discharge Instructions  Chest Pain    You have been seen today for chest pain or discomfort.  At this time, your doctor has found no signs that your chest pain is due to a serious or life-threatening condition, (or you have declined more testing and/or admission to the hospital). However,  sometimes there is a serious problem that does not show up right away. Your evaluation today may not be complete and you may need further testing and evaluation.     You need to follow-up with your regular doctor within 3 days.    Return to the Emergency Department if:    Your chest pain changes, gets worse, starts to happen more often, or comes with less activity.    You are short of breath.    You get very weak or tired.    You pass out or faint.    You have any new symptoms, like fever, cough, numb legs, or you cough up blood.    You have anything else that worries you.    Until you follow-up with your regular doctor please do the following:    Take one aspirin daily unless you have an allergy or are told not to by your doctor.    If a stress test appointment has been made, go to the appointment.    If you have questions, contact your regular doctor.    If your doctor today has told you to follow-up with your regular doctor, it is very important that you make an appointment with your clinic and go to the appointment.  If you do not follow-up with your primary doctor, it may result in missing an important development which could result in permanent injury or disability and/or lasting pain.  If there is any problem keeping your appointment, call your doctor or return to the Emergency Department.    If you were given a prescription for medicine here today, be sure to read all of the information (including the package insert) that comes with your prescription.  This will include important information about the medicine, its side effects, and any warnings that you need to know about.  The pharmacist who fills the prescription can provide more information and answer questions you may have about the medicine.  If you have questions or concerns that the pharmacist cannot address, please call or return to the Emergency Department.     Opioid Medication Information    Pain medications are among the most commonly prescribed  medicines, so we are including this information for all our patients. If you did not receive pain medication or get a prescription for pain medicine, you can ignore it.     You may have been given a prescription for an opioid (narcotic) pain medicine and/or have received a pain medicine while here in the Emergency Department. These medicines can make you drowsy or impaired. You must not drive, operate dangerous equipment, or engage in any other dangerous activities while taking these medications. If you drive while taking these medications, you could be arrested for DUI, or driving under the influence. Do not drink any alcohol while you are taking these medications.     Opioid pain medications can cause addiction. If you have a history of chemical dependency of any type, you are at a higher risk of becoming addicted to pain medications.  Only take these prescribed medications to treat your pain when all other options have been tried. Take it for as short a time and as few doses as possible. Store your pain pills in a secure place, as they are frequently stolen and provide a dangerous opportunity for children or visitors in your house to start abusing these powerful medications. We will not replace any lost or stolen medicine.  As soon as your pain is better, you should flush all your remaining medication.     Many prescription pain medications contain Tylenol  (acetaminophen), including Vicodin , Tylenol #3 , Norco , Lortab , and Percocet .  You should not take any extra pills of Tylenol  if you are using these prescription medications or you can get very sick.  Do not ever take more than 3000 mg of acetaminophen in any 24 hour period.    All opioids tend to cause constipation. Drink plenty of water and eat foods that have a lot of fiber, such as fruits, vegetables, prune juice, apple juice and high fiber cereal.  Take a laxative if you don t move your bowels at least every other day. Miralax , Milk of Magnesia,  Colace , or Senna  can be used to keep you regular.      Remember that you can always come back to the Emergency Department if you are not able to see your regular doctor in the amount of time listed above, if you get any new symptoms, or if there is anything that worries you.

## 2018-02-25 NOTE — ED AVS SNAPSHOT
Emergency Department    [address] [facility] [address]    Phone:  [phone]    Fax:  [phone]                                       Juany Blandon   MRN: [id number]    Department:   Emergency Department   Date of Visit:  2/25/2018           Patient Information     Date Of Birth          1975        Your diagnoses for this visit were:     Essential hypertension, benign     Facial paresthesia        You were seen by Daphney Felder MD.      Follow-up Information     Follow up with Korina Garcia MD. Schedule an appointment as soon as possible for a visit in 1 day.    Specialty:  Family Practice    Contact information:    [address]  [place] [id number] [phone]          Follow up with  Emergency Department.    Specialty:  EMERGENCY MEDICINE    Why:  As needed, If symptoms worsen    Contact information:    [address] [facility] [id number] [phone]        Discharge Instructions       Discharge Instructions  Hypertension - High Blood Pressure    During you visit to the Emergency Department, your blood pressure was higher than the recommended blood pressure.  This may be related to stress, pain, medication or other temporary conditions. In these cases, your blood pressure may return to normal on its own. If you have a history of high blood pressure, you may need to have your doctor adjust your medications. Sometimes, your high measurement here may indicate that you have developed high blood pressure that will stay high unless it is treated. Sudden very high blood pressure can cause problems, but usually high blood pressure causes problems over months to years.      Blood pressure is almost never lowered in the Emergency Department, because studies have shown that lowering blood pressure too quickly is much more dangerous than leaving it alone.    You need to follow up with your doctor in 1-3 days to get your blood pressure  rechecked.     Return to the Emergency Department if you start to have:    A severe headache.    Chest pain.    Shortness of breath.    Weakness or numbness that affects one part of the body.    Confusion.    Vision changes.    Significant swelling of legs and/or eyes.    A reaction to any medication started in the Emergency Department.    What can I do to help myself?    Avoid alcohol.    Take any blood pressure medicine that you are prescribed.    Get a good night s sleep.    Lower your salt intake.    Exercise.    Lose weight.    Manage stress.    If blood pressure medication was started in the Emergency Department:    The medicine may not have an immediate effect. The body and brain determine what blood pressure you have. The medicine s job is to retrain the body s  thermostat  to a lower blood pressure.    You will need to follow up with your doctor to see how this medicine is working for you.  If you were given a prescription for medicine here today, be sure to read all of the information (including the package insert) that comes with your prescription.  This will include important information about the medicine, its side effects, and any warnings that you need to know about.  The pharmacist who fills the prescription can provide more information and answer questions you may have about the medicine.  If you have questions or concerns that the pharmacist cannot address, please call or return to the Emergency Department.   Opioid Medication Information    Pain medications are among the most commonly prescribed medicines, so we are including this information for all our patients. If you did not receive pain medication or get a prescription for pain medicine, you can ignore it.     You may have been given a prescription for an opioid (narcotic) pain medicine and/or have received a pain medicine while here in the Emergency Department. These medicines can make you drowsy or impaired. You must not drive, operate  dangerous equipment, or engage in any other dangerous activities while taking these medications. If you drive while taking these medications, you could be arrested for DUI, or driving under the influence. Do not drink any alcohol while you are taking these medications.     Opioid pain medications can cause addiction. If you have a history of chemical dependency of any type, you are at a higher risk of becoming addicted to pain medications.  Only take these prescribed medications to treat your pain when all other options have been tried. Take it for as short a time and as few doses as possible. Store your pain pills in a secure place, as they are frequently stolen and provide a dangerous opportunity for children or visitors in your house to start abusing these powerful medications. We will not replace any lost or stolen medicine.  As soon as your pain is better, you should flush all your remaining medication.     Many prescription pain medications contain Tylenol  (acetaminophen), including Vicodin , Tylenol #3 , Norco , Lortab , and Percocet .  You should not take any extra pills of Tylenol  if you are using these prescription medications or you can get very sick.  Do not ever take more than 3000 mg of acetaminophen in any 24 hour period.    All opioids tend to cause constipation. Drink plenty of water and eat foods that have a lot of fiber, such as fruits, vegetables, prune juice, apple juice and high fiber cereal.  Take a laxative if you don t move your bowels at least every other day. Miralax , Milk of Magnesia, Colace , or Senna  can be used to keep you regular.      Remember that you can always come back to the Emergency Department if you are not able to see your regular doctor in the amount of time listed above, if you get any new symptoms, or if there is anything that worries you.    Follow up with your doctor tomorrow.  Return for worsening numbness or any weakness or difficulty speaking    24 Hour  Appointment Hotline       To make an appointment at any [facility] (formerly [facility]), call [phone] ([phone]). If you don't have a family doctor or clinic, we will help you find one. [facility] are conveniently located to serve the needs of you and your family.             Review of your medicines      Our records show that you are taking the medicines listed below. If these are incorrect, please call your family doctor or clinic.        Dose / Directions Last dose taken    labetalol 100 MG tablet   Commonly known as:  NORMODYNE   Quantity:  60 tablet        TAKE 1 TABLET(100 MG) BY MOUTH TWICE DAILY   Refills:  3        naproxen 500 MG tablet   Commonly known as:  NAPROSYN   Dose:  500 mg   Quantity:  30 tablet        Take 1 tablet (500 mg) by mouth 2 times daily (with meals)   Refills:  0        prenatal multivitamin plus iron 27-0.8 MG Tabs per tablet   Dose:  1 tablet   Quantity:  100 tablet        Take 1 tablet by mouth daily   Refills:  3                Procedures and tests performed during your visit     Basic metabolic panel    CBC with platelets differential    MR Brain w/o & w Contrast    Troponin I      Orders Needing Specimen Collection     None      Pending Results     No orders found from 2/23/2018 to 2/26/2018.            Pending Culture Results     No orders found from 2/23/2018 to 2/26/2018.            Pending Results Instructions     If you had any lab results that were not finalized at the time of your Discharge, you can call the ED Lab Result RN at [phone]. You will be contacted by this team for any positive Lab results or changes in treatment. The nurses are available 7 days a week from 10A to 6:30P.  You can leave a message 24 hours per day and they will return your call.        Test Results From Your Hospital Stay        2/25/2018 11:12 PM      Narrative     MRI BRAIN WITHOUT AND WITH CONTRAST  2/25/2018 10:57 PM    HISTORY:  Left facial numbness.      TECHNIQUE:  Multiplanar,  multisequence MRI of the brain without and  with 7 mL Gadavist    COMPARISON: Head CT 9/19/2009.    FINDINGS: There is no evidence of hemorrhage, mass, acute infarct, or  anomaly. The brain parenchyma, ventricles and subarachnoid spaces  appear normal.     There is no abnormal intracranial enhancement.     The facial structures appear normal. The arteries at the base of the  brain and the dural venous sinuses appear patent.        Impression     IMPRESSION:  Normal MRI of the brain.    JOSS LARSON MD         2/25/2018 10:11 PM      Component Results     Component Value Ref Range & Units Status    WBC 7.3 4.0 - 11.0 10e9/L Final    RBC Count 4.07 3.8 - 5.2 10e12/L Final    Hemoglobin 12.7 11.7 - 15.7 g/dL Final    Hematocrit 36.3 35.0 - 47.0 % Final    MCV 89 78 - 100 fl Final    MCH 31.2 26.5 - 33.0 pg Final    MCHC 35.0 31.5 - 36.5 g/dL Final    RDW 12.8 10.0 - 15.0 % Final    Platelet Count 227 150 - 450 10e9/L Final    Diff Method Automated Method  Final    % Neutrophils 59.2 % Final    % Lymphocytes 32.9 % Final    % Monocytes 5.4 % Final    % Eosinophils 2.3 % Final    % Basophils 0.1 % Final    % Immature Granulocytes 0.1 % Final    Nucleated RBCs 0 0 /100 Final    Absolute Neutrophil 4.3 1.6 - 8.3 10e9/L Final    Absolute Lymphocytes 2.4 0.8 - 5.3 10e9/L Final    Absolute Monocytes 0.4 0.0 - 1.3 10e9/L Final    Absolute Eosinophils 0.2 0.0 - 0.7 10e9/L Final    Absolute Basophils 0.0 0.0 - 0.2 10e9/L Final    Abs Immature Granulocytes 0.0 0 - 0.4 10e9/L Final    Absolute Nucleated RBC 0.0  Final         2/25/2018 10:25 PM      Component Results     Component Value Ref Range & Units Status    Sodium 140 133 - 144 mmol/L Final    Potassium 3.9 3.4 - 5.3 mmol/L Final    Chloride 107 94 - 109 mmol/L Final    Carbon Dioxide 27 20 - 32 mmol/L Final    Anion Gap 6 3 - 14 mmol/L Final    Glucose 95 70 - 99 mg/dL Final    Urea Nitrogen 16 7 - 30 mg/dL Final    Creatinine 0.97 0.52 - 1.04 mg/dL Final    GFR Estimate  63 >60 mL/min/1.7m2 Final    Non  GFR Calc    GFR Estimate If Black 76 >60 mL/min/1.7m2 Final    African American GFR Calc    Calcium 8.3 (L) 8.5 - 10.1 mg/dL Final         2/25/2018 10:29 PM      Component Results     Component Value Ref Range & Units Status    Troponin I ES <0.015 0.000 - 0.045 ug/L Final    The 99th percentile for upper reference range is 0.045 ug/L.  Troponin values   in the range of 0.045 - 0.120 ug/L may be associated with risks of adverse   clinical events.                  Clinical Quality Measure: Blood Pressure Screening     Your blood pressure was checked while you were in the emergency department today. The last reading we obtained was  BP: (!) 161/110 . Please read the guidelines below about what these numbers mean and what you should do about them.  If your systolic blood pressure (the top number) is less than 120 and your diastolic blood pressure (the bottom number) is less than 80, then your blood pressure is normal. There is nothing more that you need to do about it.  If your systolic blood pressure (the top number) is 120-139 or your diastolic blood pressure (the bottom number) is 80-89, your blood pressure may be higher than it should be. You should have your blood pressure rechecked within a year by a primary care provider.  If your systolic blood pressure (the top number) is 140 or greater or your diastolic blood pressure (the bottom number) is 90 or greater, you may have high blood pressure. High blood pressure is treatable, but if left untreated over time it can put you at risk for heart attack, stroke, or kidney failure. You should have your blood pressure rechecked by a primary care provider within the next 4 weeks.  If your provider in the emergency department today gave you specific instructions to follow-up with your doctor or provider even sooner than that, you should follow that instruction and not wait for up to 4 weeks for your follow-up visit.         Thank you for choosing [facility]       Thank you for choosing [facility] for your care. Our goal is always to provide you with excellent care. Hearing back from our patients is one way we can continue to improve our services. Please take a few minutes to complete the written survey that you may receive in the mail after you visit with us. Thank you!        [employer] Information     [employer] gives you secure access to your electronic health record. If you see a primary care provider, you can also send messages to your care team and make appointments. If you have questions, please call your primary care clinic.  If you do not have a primary care provider, please call [phone] and they will assist you.        Care EveryWhere ID     This is your Care EveryWhere ID. This could be used by other organizations to access your [facility] medical records  [id number]        Equal Access to Services     MELISSA COURTNEY : Ralph Dill, pancho walden, nettie lindo. So [facility] [phone].    ATENCIÓN: Si habla español, tiene a jacques disposición servicios gratuitos de asistencia lingüística. Llame al [phone].    We comply with applicable federal civil rights laws and [place] laws. We do not discriminate on the basis of race, color, national origin, age, disability, sex, sexual orientation, or gender identity.            After Visit Summary       This is your record. Keep this with you and show to your community pharmacist(s) and doctor(s) at your next visit.

## 2018-02-25 NOTE — ED AVS SNAPSHOT
Emergency Department    [address] [place] [address]    Phone:  [phone]    Fax:  [phone]                                       Juany Blandon   MRN: [id number]    Department:   Emergency Department   Date of Visit:  2/25/2018           After Visit Summary Signature Page     I have received my discharge instructions, and my questions have been answered. I have discussed any challenges I see with this plan with the nurse or doctor.    ..........................................................................................................................................  Patient/Patient Representative Signature      ..........................................................................................................................................  Patient Representative Print Name and Relationship to Patient    ..................................................               ................................................  Date                                            Time    ..........................................................................................................................................  Reviewed by Signature/Title    ...................................................              ..............................................  Date                                                            Time

## 2018-02-25 NOTE — ED PROVIDER NOTES
"  History     Chief Complaint:  Hypertension     HPI   Juany Blandon is a 42 year old female with a history of hypertension who presents to the emergency department today for evaluation of hypertension. The patient reports she has been having higher BP for the past four days. All day today, she saw feeling a heaviness in her chest, does not describe it as painful, that did not worsen with exercise or eating, prompting her to present to the emergency department. She normally sees Dr. Garcia for her hypertension at her clinic in [place]. She denies fever, vomiting or abdominal pain.     Chest heaviness is constant, nonradiating, and patient states the chest pain gets worse when her blood pressure goes up.    Allergies:  No Known Drug Allergies    Medications:    labetalol (NORMODYNE) 100 MG tablet  naproxen (NAPROSYN) 500 MG tablet    Past Medical History:    Hypertension.     Past Surgical History:    C section    Family History:    Hypertension  Diabetes  CAD    Social History:  The patient was alone in the emergency department.   Smoking Status: former  Smokeless Tobacco: never  Alcohol Use: no  Marital Status:       Review of Systems   Gastrointestinal:        Hypertension   All other systems reviewed and are negative.    Physical Exam   First Vitals: BP (!) 146/95  Pulse 75  Temp 98.6  F (37  C) (Oral)  Resp 20  Ht 1.575 m (5' 2\")  Wt 68.9 kg (152 lb)  SpO2 98%  BMI 27.8 kg/m2    Physical Exam  Vitals: reviewed by me  General: Pt seen on [facility], cooperative, and alert to conversation  Eyes: Tracking well, clear conjunctiva BL  ENT: MMM, midline trachea.   Lymph: No lymphadenopathy or masses noted  Lungs:   No tachypnea, no accessory muscle use. No respiratory distress.   CV: Rate as above, regular rhythm.  No MGR to auscultation   Abd: Soft, non tender, no guarding, no rebound. Non distended  MSK: no peripheral edema or joint effusion.  No evidence of trauma  Skin: No rash, normal " turgor and temperature  Neuro: Clear speech and no facial droop.  BUE and BLE with SILT and 5/5 motor   Psych: Not RIS, no e/o AH/VH    Emergency Department Course     ECG:  Indication: Hypertension  Completed at 2148.  Read at 2200.   Normal sinus rhythm  Normal ECG  Rate 68 bpm. KY interval 178. QRS duration 78. QT/QTc 422/448. P-R-T axes 68 45 34.    Laboratory:  Laboratory findings were communicated with the patient who voiced understanding of the findings.  CBC: WBC 7.4, HGB 12.3,   BMP: Creatinine 0.94, calcium 8.1 (L)  Troponin: <0.015    Interventions:  2150 Aspirin 162 mg PO     Emergency Department Course:  Nursing notes and vitals reviewed.  I performed an exam of the patient as documented above.   IV was inserted and blood was drawn for laboratory testing, results above.    2246 Patient rechecked and updated.     I personally reviewed the laboratory results with the Patient and answered all related questions prior to  Discharge.  Findings and plan explained to the Patient. Patient discharged home with instructions regarding supportive care, medications, and reasons to return. The importance of close follow-up was reviewed.     Impression & Plan      Medical Decision Making:  Juany Blandon is a 42 year old female who presents with high blood pressure, vague chest pressure, main issue is her high blood pressure she states. Reguarging her chest pressure, it is very reassuring there is no excertional component, has been constant for several days, and patient states it is only present after she noticed her BP is high. Hre in the emergency department, she has a negative troponin, negative ECG and no evidence of cardiac stress frokm he rhigh BP, moreover, her BPs are now systolic 160s with no interventions. This is a chronic problem for the patient, I do feel comfortable sending her to follow up with her PCP for additional medications as well as medical titration. Patient is ok with this plan and  all questions were answered. No evidence of hypertensive urgency or enphephalopathy and patient does appear to have a normal physical exam, grossly normal neurological exam with no evidence of hypertensive encephelapathy. Will discharge as above.     Diagnosis:      1. Benign essential hypertension     Disposition:  discharged to home    Scribe Disclosure:  I, Kobe Taveras, am serving as a scribe at 9:39 PM on 2/24/2018 to document services personally performed by Mohit Tabares based on my observations and the provider's statements to me.     2/24/2018    EMERGENCY DEPARTMENT       Mohit Tabares MD  02/25/18 0110

## 2018-02-26 ENCOUNTER — OFFICE VISIT (OUTPATIENT)
Dept: FAMILY MEDICINE | Facility: CLINIC | Age: 43
End: 2018-02-26
Payer: COMMERCIAL

## 2018-02-26 VITALS
DIASTOLIC BLOOD PRESSURE: 84 MMHG | BODY MASS INDEX: 28.71 KG/M2 | OXYGEN SATURATION: 99 % | WEIGHT: 156 LBS | SYSTOLIC BLOOD PRESSURE: 128 MMHG | HEIGHT: 62 IN | HEART RATE: 72 BPM | TEMPERATURE: 98.2 F

## 2018-02-26 DIAGNOSIS — R63.5 WEIGHT GAIN: Primary | ICD-10-CM

## 2018-02-26 DIAGNOSIS — I10 HTN, GOAL BELOW 140/90: ICD-10-CM

## 2018-02-26 DIAGNOSIS — R20.0 FACIAL NUMBNESS: ICD-10-CM

## 2018-02-26 PROCEDURE — 84443 ASSAY THYROID STIM HORMONE: CPT | Performed by: FAMILY MEDICINE

## 2018-02-26 PROCEDURE — 99214 OFFICE O/P EST MOD 30 MIN: CPT | Performed by: FAMILY MEDICINE

## 2018-02-26 PROCEDURE — 36415 COLL VENOUS BLD VENIPUNCTURE: CPT | Performed by: FAMILY MEDICINE

## 2018-02-26 NOTE — ED PROVIDER NOTES
"  History     Chief Complaint:  Hypertension    HPI   Juany Blandon is a 42 year old female who presents with high blood pressure and numbness. The patient was seen here last night for high blood pressure and a sensation of chest pressure. Today, the patient reports her blood pressure has remained high. In addition, she has been having intermittent headaches. Patient was most concerned about swelling/numbness sensation that she is experiencing in her face. She has also been having hot flashes, and feeling like she would faint. Denies fevers.    No fevers  Feels well other than the numbness    Allergies:  The patient has no known drug allergies.     Medications:    Labetalol     Past Medical History:    Fibroadenoma  HTN    Past Surgical History:    Breast biopsy      Family History:    Lymphoma  Fibroids  HLD  HTN    Social History:  Relationship status:   Tobacco use: Former smoker (Quit )  Alcohol use: Negative  The patient presents alone.      Review of Systems   Neurological: Positive for numbness and headaches.     10 point review of systems performed and is negative except as above and in HPI.      Physical Exam   First Vitals:  BP: (!) 172/108  Heart Rate: 74  Temp: 98.6  F (37  C)  Resp: 18  Height: 157.5 cm (5' 2\")  Weight: 72.6 kg (160 lb)  SpO2: 99 %    Physical Exam  General: Resting on the gurney, appears comfortable  Head:  The scalp, face, and head appear normal  Mouth/Throat: Mucus membranes are moist  CV:  Regular rate    Normal S1 and S2  No pathological murmur   Resp:  Breath sounds clear and equal bilaterally    Non-labored, no retractions or accessory muscle use    No coarseness    No wheezing   GI:  Abdomen is soft, no rigidity    No tenderness to palpation  MS:  Normal motor assessment of all extremities.    Good capillary refill noted.  Skin:   No rash or lesions noted.  Neuro:  Speech is normal and fluent. No apparent deficit. Cranial nerves 2-12 intact by " examination, except for paraesthesias of the left cheek. Sensation intact x4. Strength intact x4.   Psych:  Awake. Alert.  Normal affect.      Appropriate interactions.      Emergency Department Course     Imaging:  Radiographic findings were communicated with the patient who voiced understanding of the findings.    MR brain, with and without contrast, per radiology:   Normal MRI of the brain.     Laboratory:  CBC: WNL (, HGB 12.7, )  BMP: Calcium 8.3 (L), o/w WNL (Creatinine 0.97)  2200: Troponin I: <0.015     Emergency Department Course:  Nursing notes and vitals reviewed.  I performed an exam of the patient as documented above.  The above workup was undertaken.  2323: I rechecked the patient and discussed results.    Findings and plan explained to the Patient. Patient discharged home, status improved, with instructions regarding supportive care, medications, and reasons to return as well as the importance of close follow-up was reviewed.      Impression & Plan      Medical Decision Making:  Juany Blandon is a 42 year old female who presents complaining of facial numbness. The patient was seen at this ED yesterday for HTN, and was hypertensive today, which concerned her. She noted she had paraesthesias above her left upper lip. She has no other sensory deficits, no cranial nerve weakness, and otherwise normal and reassuring exam. Given her paraesthesias, an MRI was obtained, and was unremarkable. Symptoms may be secondary to her early Bell's, which was discussed with her. If her symptoms worsen in any way, she will return to ED. Blood pressure improved while in the ED, and especially given her paraesthesias, I do not want to further decrease her blood pressure. She will follow up with her PCP tomorrow. The patient has not had any fevers or signs of infectious etiology, and is appropriate for discharge to home. She is given return and follow up instructions, and demonstrates understanding and the  ability to comply.      Diagnosis:    ICD-10-CM   1. Essential hypertension, benign I10   2. Facial paresthesia R20.9     Disposition:  Discharge to home with primary care follow up.     I, Elder Bender, am serving as a scribe on 2/25/2018 at 9:56 PM to personally document services performed by Daphney Felder MD, based on my observations and the provider's statements to me.     EMERGENCY DEPARTMENT       Daphney Felder MD  02/27/18 0261

## 2018-02-26 NOTE — PATIENT INSTRUCTIONS
Identifying Your Heart Risks  What are your risk factors?  A risk factor increases your chance of having heart disease. Some risk factors can t be controlled, such as age or family history of heart disease. But most others can be managed by making lifestyle changes and taking medicine. For each risk factor you reduce, your chance of heart attack and stroke goes down. And, the length and quality of life may go up.  You can make changes to manage the following risk factors.  Abnormal cholesterol levels  Abnormal levels of cholesterol can increase your risk of developing atherosclerotic cardiovascular disease (ASCVD), which can lead to a heart, stroke, or other problems. If your cholesterol levels are of concern, your healthcare provider will work with you to improve your cholesterol level. Lifestyle changes such as diet, exercise, and weight management can help improve your cholesterol level, but you may also need medicine.    High blood pressure  High blood pressure (hypertension) occurs when blood pushes too hard against artery walls as it flows through them. This damages the artery lining. In general, you re at risk if you have:    Blood pressure of 120/80 or higher. Your doctor may prescribe a personal goal.    Blood pressure of 140/90 is high blood pressure.  Smoking  This is the most important risk factor you can change. Smoking damages arteries and makes it easier for plaque to build up. Smokers are also at higher risk for blood clots (which can block arteries) and stroke. You re at risk if you use any kind of tobacco or nicotine. This means:    Cigarettes    E-cigarettes    Chew tobacco    Cigars    Pipe  Diabetes  This health problem leads to a high level of sugar in your blood. It can damage the arteries if not kept under control. Diabetes makes you more likely to have a silent heart attack (one without symptoms). You re at risk if:    Your A1C is between 5.7 and 6.4. Once it reaches 6.5, you have  diabetes.  Your healthcare provider will help you figure out what your A1C should be. Your target number will depend on your age, general health, and other factors. Your treatment plan may need changes if your current number is too high.  Excess weight  Being overweight makes other risk factors, such as high blood pressure and diabetes, more likely. Excess weight around the waist or stomach increases your heart disease risk the most. You re at risk if your:    Waist circumference is more than 35 inches (women) or 40 inches (men).    Body mass index (BMI) is greater than 25.  Lack of physical activity  If you re not active, problems with diabetes, blood pressure, cholesterol, and weight are more likely. You re at risk if:    You exercise less than 40 minutes per day, on fewer than 3 to 4 days a week.  Stress and strong emotions  Stressful events and feelings can raise heart rate and blood pressure. Stress can also bring on feelings of depression, anxiety, and anger. These feelings do not directly lead to heart disease, but they do affect overall health and make quality of life worse.  Date Last Reviewed: 7/1/2016 2000-2017 [employer]. [address] [place] [address]. All rights reserved. This information is not intended as a substitute for professional medical care. Always follow your healthcare professional's instructions.        Understanding the Link Between High Blood Pressure and Stroke  Each day that your blood pressure is too high, your chances of having a stroke are increased. Normal blood pressure is considered to be less than 120 over less than 80 millimeters of mercury (mmHg) or 120/80 mmHg. A stroke is a loss of brain function caused by a lack of blood to the brain. Stroke can result from the damage that ongoing high blood pressure causes in your vessels. If the affected vessel stops supplying blood to the brain, a stroke results.  High blood pressure damages blood  vessels    Vessels thicken  When blood presses against a vessel wall with too much force, muscles in the wall lose their ability to stretch. This causes the wall to thicken, which narrows the vessel passage and reduces blood flow.   Clots form  When blood pressure is too high, it can damage blood vessel walls which results in scar tissue. Fat and cholesterol (plaque) collect in the damaged spots. Blood cells stick to the plaque, forming a mass called a clot. A clot can block blood flow in the vessel.   Vessels break  Sometimes blood flows with enough force to weaken a vessel wall. If the vessel is small or damaged, the wall can break. When this happens, blood leaks into nearby tissue and kills cells. Other cells may die because blood cannot reach them.   Know the symptoms of stroke  During a stroke, blood supply to the brain is cut off. But with prompt medical help, a better recovery is more likely. Don t wait. Call 911 if you have any of the symptoms below:    Sudden weakness or numbness on one side of the face or body, including a leg or an arm    Sudden trouble seeing with one or both eyes    Sudden double vision    Sudden trouble talking, such as slurred speech    Sudden severe headache    Sudden problems using or understanding words    Sudden dizziness or loss of balance    Seizures for the first time     Any of these symptoms that occur and then resolve    Date Last Reviewed: 5/1/2017 2000-2017 [employer]. [address] [place] [address], [place] [address]. All rights reserved. This information is not intended as a substitute for professional medical care. Always follow your healthcare professional's instructions.

## 2018-02-26 NOTE — DISCHARGE INSTRUCTIONS
Discharge Instructions  Hypertension - High Blood Pressure    During you visit to the Emergency Department, your blood pressure was higher than the recommended blood pressure.  This may be related to stress, pain, medication or other temporary conditions. In these cases, your blood pressure may return to normal on its own. If you have a history of high blood pressure, you may need to have your doctor adjust your medications. Sometimes, your high measurement here may indicate that you have developed high blood pressure that will stay high unless it is treated. Sudden very high blood pressure can cause problems, but usually high blood pressure causes problems over months to years.      Blood pressure is almost never lowered in the Emergency Department, because studies have shown that lowering blood pressure too quickly is much more dangerous than leaving it alone.    You need to follow up with your doctor in 1-3 days to get your blood pressure rechecked.     Return to the Emergency Department if you start to have:    A severe headache.    Chest pain.    Shortness of breath.    Weakness or numbness that affects one part of the body.    Confusion.    Vision changes.    Significant swelling of legs and/or eyes.    A reaction to any medication started in the Emergency Department.    What can I do to help myself?    Avoid alcohol.    Take any blood pressure medicine that you are prescribed.    Get a good night s sleep.    Lower your salt intake.    Exercise.    Lose weight.    Manage stress.    If blood pressure medication was started in the Emergency Department:    The medicine may not have an immediate effect. The body and brain determine what blood pressure you have. The medicine s job is to retrain the body s  thermostat  to a lower blood pressure.    You will need to follow up with your doctor to see how this medicine is working for you.  If you were given a prescription for medicine here today, be sure to read all of  the information (including the package insert) that comes with your prescription.  This will include important information about the medicine, its side effects, and any warnings that you need to know about.  The pharmacist who fills the prescription can provide more information and answer questions you may have about the medicine.  If you have questions or concerns that the pharmacist cannot address, please call or return to the Emergency Department.   Opioid Medication Information    Pain medications are among the most commonly prescribed medicines, so we are including this information for all our patients. If you did not receive pain medication or get a prescription for pain medicine, you can ignore it.     You may have been given a prescription for an opioid (narcotic) pain medicine and/or have received a pain medicine while here in the Emergency Department. These medicines can make you drowsy or impaired. You must not drive, operate dangerous equipment, or engage in any other dangerous activities while taking these medications. If you drive while taking these medications, you could be arrested for DUI, or driving under the influence. Do not drink any alcohol while you are taking these medications.     Opioid pain medications can cause addiction. If you have a history of chemical dependency of any type, you are at a higher risk of becoming addicted to pain medications.  Only take these prescribed medications to treat your pain when all other options have been tried. Take it for as short a time and as few doses as possible. Store your pain pills in a secure place, as they are frequently stolen and provide a dangerous opportunity for children or visitors in your house to start abusing these powerful medications. We will not replace any lost or stolen medicine.  As soon as your pain is better, you should flush all your remaining medication.     Many prescription pain medications contain Tylenol  (acetaminophen),  including Vicodin , Tylenol #3 , Norco , Lortab , and Percocet .  You should not take any extra pills of Tylenol  if you are using these prescription medications or you can get very sick.  Do not ever take more than 3000 mg of acetaminophen in any 24 hour period.    All opioids tend to cause constipation. Drink plenty of water and eat foods that have a lot of fiber, such as fruits, vegetables, prune juice, apple juice and high fiber cereal.  Take a laxative if you don t move your bowels at least every other day. Miralax , Milk of Magnesia, Colace , or Senna  can be used to keep you regular.      Remember that you can always come back to the Emergency Department if you are not able to see your regular doctor in the amount of time listed above, if you get any new symptoms, or if there is anything that worries you.    Follow up with your doctor tomorrow.  Return for worsening numbness or any weakness or difficulty speaking

## 2018-02-26 NOTE — PROGRESS NOTES
SUBJECTIVE:   Juany Blandon is a 42 year old female who presents to clinic today for the following health issues:      ED/UC Followup:    Facility:  [facility] ED   Date of visit: 2018/ 2018  Reason for visit: Hypertension/Chest Heaviness/ Left sided facial numbness   Current Status: feeling better today- mild lightheaded this AM but is better now        She had extensive workup done including MRI scan of her brain, cardiac enzymes etc.  All the tests were normal.  Overall she is feeling much better.  She still feel a bit numbness on the left side of her face.  Denies any associated headache focal weakness dizziness etc. but she is worried and wants a referral to see the neurologist as  she was told by ER physician.  She also has family history of stroke in grandparent    Hypertension Follow-up      Outpatient blood pressures are being checked at home.  Results were higher high around 178/118 on Sat 183/116 , earlier, she mentioned going to the ER.  although better now with the medication.  She she thinks may be her blood pressure machine,  was not working well.    Low Salt Diet: low salt      Amount of exercise or physical activity: None    Problems taking medications regularly: No    Medication side effects: none    Diet: regular (no restrictions)        Problem list and histories reviewed & adjusted, as indicated.  Additional history: as documented    Patient Active Problem List   Diagnosis     JOINT PAIN-MULT RODO, W/U NEG ~     Essential hypertension     CARDIOVASCULAR SCREENING; LDL GOAL LESS THAN 160     Former smoker     Lumbar radiculopathy     HTN, goal below 140/90     Gastroesophageal reflux disease, esophagitis presence not specified     Seasonal allergic rhinitis     Essential hypertension with goal blood pressure less than 140/90     BMI 27.0-27.9,adult     Past Surgical History:   Procedure Laterality Date     C NONSPECIFIC PROCEDURE      L BREAST BX, bening 2014        "SECTION      2011       Social History   Substance Use Topics     Smoking status: Former Smoker     Packs/day: 0.50     Types: Cigarettes     Quit date: 2/16/2010     Smokeless tobacco: Never Used     Alcohol use No     Family History   Problem Relation Age of Onset     Blood Disease Father      non higd lymphoma     Gynecology Mother      fibroids     Lipids Mother      Hypertension Mother      DIABETES Maternal Grandmother      Arthritis Paternal Grandmother      C.A.D. No family hx of      Breast Cancer No family hx of      Cancer - colorectal No family hx of            Reviewed and updated as needed this visit by clinical staff       Reviewed and updated as needed this visit by Provider         ROS:  Constitutional, HEENT, cardiovascular, pulmonary, GI, , musculoskeletal, neuro, skin, endocrine and psych systems are negative, except as otherwise noted.    OBJECTIVE:     /84  Pulse 72  Temp 98.2  F (36.8  C) (Tympanic)  Ht 5' 2\" (1.575 m)  Wt 156 lb (70.8 kg)  SpO2 99%  BMI 28.53 kg/m2  Body mass index is 28.53 kg/(m^2).  GENERAL: healthy, alert and no distress  HENT: ear canals and TM's normal, nose and mouth without ulcers or lesions  NECK: no adenopathy, no asymmetry, masses, or scars and thyroid normal to palpation  RESP: lungs clear to auscultation - no rales, rhonchi or wheezes  CV: regular rate and rhythm, normal S1 S2, no S3 or S4, no murmur, click or rub, no peripheral edema and peripheral pulses strong  ABDOMEN: soft, nontender, no hepatosplenomegaly, no masses and bowel sounds normal  MS: no edema  NEURO: Normal strength and tone, mentation intact and speech normal  PSYCH: mentation appears normal, affect normal/bright        ASSESSMENT/PLAN:         (I10) HTN, goal below 140/90  Comment: Improved and stable  Plan: NEUROLOGY ADULT REFERRAL, US Carotid Bilateral            (R20.0) Facial numbness  Comment: left lip area  Plan: TSH with free T4 reflex, NEUROLOGY ADULT         REFERRAL,  " Carotid Bilateral              (R63.5) Weight gain  (primary encounter diagnosis)  Comment: Mostly worried about her thyroid because of the family history  Plan: TSH with free T4 reflex          Clinically she is improved and doing better.  Her blood pressure is doing much better , since recent ER visit.  She will continue with same meds.  She is still concerned with some numbness on the left.  Although her MRI was negative.  She was to see the neurologist, she was worried but TIA etc.  Referral given.  She also wished to proceed with the carotid ultrasound to make sure it is okay.  She will follow-up here as needed.    Patient expressed understanding and agreement with treatment plan. All patient's questions were answered, will let me know if has more later.  Medications: Rx's: Reviewed the potential side effects/complications of medications prescribed.     Korina Garcia MD  [facility]IRIS[facility] TALBERT

## 2018-02-26 NOTE — NURSING NOTE
"Chief Complaint   Patient presents with     Hospital F/U     Hypertension       Initial BP (!) 136/93  Pulse 72  Temp 98.2  F (36.8  C) (Tympanic)  Ht 5' 2\" (1.575 m)  Wt 156 lb (70.8 kg)  SpO2 99%  BMI 28.53 kg/m2 Estimated body mass index is 28.53 kg/(m^2) as calculated from the following:    Height as of this encounter: 5' 2\" (1.575 m).    Weight as of this encounter: 156 lb (70.8 kg).  Medication Reconciliation: complete  "

## 2018-02-26 NOTE — MR AVS SNAPSHOT
After Visit Summary   2/26/2018    Juany Blandon    MRN: [id number]           Patient Information     Date Of Birth          1975        Visit Information        Provider Department      2/26/2018 10:20 AM Korina Garcia MD [facility]        Today's Diagnoses     Weight gain    -  1    HTN, goal below 140/90        Facial numbness          Care Instructions      Identifying Your Heart Risks  What are your risk factors?  A risk factor increases your chance of having heart disease. Some risk factors can t be controlled, such as age or family history of heart disease. But most others can be managed by making lifestyle changes and taking medicine. For each risk factor you reduce, your chance of heart attack and stroke goes down. And, the length and quality of life may go up.  You can make changes to manage the following risk factors.  Abnormal cholesterol levels  Abnormal levels of cholesterol can increase your risk of developing atherosclerotic cardiovascular disease (ASCVD), which can lead to a heart, stroke, or other problems. If your cholesterol levels are of concern, your healthcare provider will work with you to improve your cholesterol level. Lifestyle changes such as diet, exercise, and weight management can help improve your cholesterol level, but you may also need medicine.    High blood pressure  High blood pressure (hypertension) occurs when blood pushes too hard against artery walls as it flows through them. This damages the artery lining. In general, you re at risk if you have:    Blood pressure of 120/80 or higher. Your doctor may prescribe a personal goal.    Blood pressure of 140/90 is high blood pressure.  Smoking  This is the most important risk factor you can change. Smoking damages arteries and makes it easier for plaque to build up. Smokers are also at higher risk for blood clots (which can block arteries) and stroke. You re at risk if you use any  kind of tobacco or nicotine. This means:    Cigarettes    E-cigarettes    Chew tobacco    Cigars    Pipe  Diabetes  This health problem leads to a high level of sugar in your blood. It can damage the arteries if not kept under control. Diabetes makes you more likely to have a silent heart attack (one without symptoms). You re at risk if:    Your A1C is between 5.7 and 6.4. Once it reaches 6.5, you have diabetes.  Your healthcare provider will help you figure out what your A1C should be. Your target number will depend on your age, general health, and other factors. Your treatment plan may need changes if your current number is too high.  Excess weight  Being overweight makes other risk factors, such as high blood pressure and diabetes, more likely. Excess weight around the waist or stomach increases your heart disease risk the most. You re at risk if your:    Waist circumference is more than 35 inches (women) or 40 inches (men).    Body mass index (BMI) is greater than 25.  Lack of physical activity  If you re not active, problems with diabetes, blood pressure, cholesterol, and weight are more likely. You re at risk if:    You exercise less than 40 minutes per day, on fewer than 3 to 4 days a week.  Stress and strong emotions  Stressful events and feelings can raise heart rate and blood pressure. Stress can also bring on feelings of depression, anxiety, and anger. These feelings do not directly lead to heart disease, but they do affect overall health and make quality of life worse.  Date Last Reviewed: 7/1/2016 2000-2017 [employer]. [address] [place] [address], [place] [address]. All rights reserved. This information is not intended as a substitute for professional medical care. Always follow your healthcare professional's instructions.        Understanding the Link Between High Blood Pressure and Stroke  Each day that your blood pressure is too high, your chances of having a stroke are increased. Normal  blood pressure is considered to be less than 120 over less than 80 millimeters of mercury (mmHg) or 120/80 mmHg. A stroke is a loss of brain function caused by a lack of blood to the brain. Stroke can result from the damage that ongoing high blood pressure causes in your vessels. If the affected vessel stops supplying blood to the brain, a stroke results.  High blood pressure damages blood vessels    Vessels thicken  When blood presses against a vessel wall with too much force, muscles in the wall lose their ability to stretch. This causes the wall to thicken, which narrows the vessel passage and reduces blood flow.   Clots form  When blood pressure is too high, it can damage blood vessel walls which results in scar tissue. Fat and cholesterol (plaque) collect in the damaged spots. Blood cells stick to the plaque, forming a mass called a clot. A clot can block blood flow in the vessel.   Vessels break  Sometimes blood flows with enough force to weaken a vessel wall. If the vessel is small or damaged, the wall can break. When this happens, blood leaks into nearby tissue and kills cells. Other cells may die because blood cannot reach them.   Know the symptoms of stroke  During a stroke, blood supply to the brain is cut off. But with prompt medical help, a better recovery is more likely. Don t wait. Call 911 if you have any of the symptoms below:    Sudden weakness or numbness on one side of the face or body, including a leg or an arm    Sudden trouble seeing with one or both eyes    Sudden double vision    Sudden trouble talking, such as slurred speech    Sudden severe headache    Sudden problems using or understanding words    Sudden dizziness or loss of balance    Seizures for the first time     Any of these symptoms that occur and then resolve    Date Last Reviewed: 5/1/2017 2000-2017 "[employer] [address] [place] [address]. All rights reserved. This information is not intended as a  substitute for professional medical care. Always follow your healthcare professional's instructions.                Follow-ups after your visit        Additional Services     NEUROLOGY ADULT REFERRAL       Your provider has referred you for the following:   Consult at [facility]: [facility] [phone]   [email or web address]  [facility]: [facility] of Neurology [facility] [phone]   [email or web address]    Please be aware that coverage of these services is subject to the terms and limitations of your health insurance plan.  Call member services at your health plan with any benefit or coverage questions.      Please bring the following with you to your appointment:    (1) Any X-Rays, CTs or MRIs which have been performed.  Contact the facility where they were done to arrange for  prior to your scheduled appointment.    (2) List of current medications  (3) This referral request   (4) Any documents/labs given to you for this referral                  Follow-up notes from your care team     Return in about 4 weeks (around 3/26/2018), or sooner f problem .      Your next 10 appointments already scheduled     Feb 27, 2018 10:00 AM CST   US CAROTID BILATERAL with SHUS1   [facility] ([facility])    [address] [facility] [address] [phone]           Please bring a list of your medicines (including vitamins, minerals and over-the-counter drugs). Also, tell your doctor about any allergies you may have. Wear comfortable clothes and leave your valuables at home.  You do not need to do anything special to prepare for your exam.  Please call the Imaging Department at your exam site with any questions.            Mar 26, 2018 10:00 AM CDT   Office Visit with Korina Garcia MD   [facility] [place] ([facility])    [address] [address]   [phone]           Bring a current list of meds and any records pertaining to this visit. For Physicals, please bring immunization records and any forms needing to be filled out. Please arrive 10 minutes early to complete paperwork.              Future tests that were ordered for you today     Open Future Orders        Priority Expected Expires Ordered    US Carotid Bilateral Routine  2/26/2019 2/26/2018            Who to contact     If you have questions or need follow up information about today's clinic visit or your schedule please contact [facility] directly at [phone].  Normal or non-critical lab and imaging results will be communicated to you by MyChart, letter or phone within 4 business days after the clinic has received the results. If you do not hear from us within 7 days, please contact the clinic through [employer] or phone. If you have a critical or abnormal lab result, we will notify you by phone as soon as possible.  Submit refill requests through [employer] or call your pharmacy and they will forward the refill request to us. Please allow 3 business days for your refill to be completed.          Additional Information About Your Visit        [employer] Information     [employer] gives you secure access to your electronic health record. If you see a primary care provider, you can also send messages to your care team and make appointments. If you have questions, please call your primary care clinic.  If you do not have a primary care provider, please call [phone] and they will assist you.        Care EveryWhere ID     This is your Care EveryWhere ID. This could be used by other organizations to access your [place] medical records  [id number]        Your Vitals Were     Pulse Temperature Height Pulse Oximetry BMI (Body Mass Index)       72 98.2  F (36.8  C) (Tympanic) 5' 2\" (1.575 m) 99% 28.53 kg/m2        Blood Pressure from Last " 3 Encounters:   02/26/18 128/84   02/25/18 (!) 161/110   02/24/18 (!) 146/95    Weight from Last 3 Encounters:   02/26/18 156 lb (70.8 kg)   02/25/18 160 lb (72.6 kg)   02/24/18 152 lb (68.9 kg)              We Performed the Following     NEUROLOGY ADULT REFERRAL     TSH with free T4 reflex        Primary Care Provider Office Phone # Fax #    Korina Garcia [phone]       [address]  [place] [address]        Equal Access to Services     [facility]: Hadii aad ku hadasho Soomaali, waaxda luqadaha, qaybta kaalmada adeegyada, nettie cardenas . So [facility] [phone].    ATENCIÓN: Si habla español, tiene a jacques disposición servicios gratuitos de asistencia lingüística. Llame al [phone].    We comply with applicable federal civil rights laws and [place] laws. We do not discriminate on the basis of race, color, national origin, age, disability, sex, sexual orientation, or gender identity.            Thank you!     Thank you for choosing [facility] [place]  for your care. Our goal is always to provide you with excellent care. Hearing back from our patients is one way we can continue to improve our services. Please take a few minutes to complete the written survey that you may receive in the mail after your visit with us. Thank you!             Your Updated Medication List - Protect others around you: Learn how to safely use, store and throw away your medicines at www.disposemymeds.org.          This list is accurate as of 2/26/18 11:26 AM.  Always use your most recent med list.                   Brand Name Dispense Instructions for use Diagnosis    labetalol 100 MG tablet    NORMODYNE    60 tablet    TAKE 1 TABLET(100 MG) BY MOUTH TWICE DAILY    Essential hypertension, HTN, goal below 140/90       naproxen 500 MG tablet    NAPROSYN    30 tablet    Take 1 tablet (500 mg) by mouth 2 times daily (with meals)    Finger numbness, Pain of finger of  right hand       prenatal multivitamin plus iron 27-0.8 MG Tabs per tablet     100 tablet    Take 1 tablet by mouth daily    Pregnancy test positive

## 2018-02-27 ENCOUNTER — TRANSFERRED RECORDS (OUTPATIENT)
Dept: HEALTH INFORMATION MANAGEMENT | Facility: CLINIC | Age: 43
End: 2018-02-27

## 2018-02-27 ENCOUNTER — HOSPITAL ENCOUNTER (OUTPATIENT)
Dept: ULTRASOUND IMAGING | Facility: CLINIC | Age: 43
Discharge: HOME | End: 2018-02-27
Attending: FAMILY MEDICINE | Admitting: FAMILY MEDICINE
Payer: COMMERCIAL

## 2018-02-27 DIAGNOSIS — R20.0 FACIAL NUMBNESS: ICD-10-CM

## 2018-02-27 DIAGNOSIS — I10 HTN, GOAL BELOW 140/90: ICD-10-CM

## 2018-02-27 LAB — TSH SERPL DL<=0.005 MIU/L-ACNC: 1.06 MU/L (ref 0.4–4)

## 2018-02-27 PROCEDURE — 93880 EXTRACRANIAL BILAT STUDY: CPT

## 2018-02-28 ENCOUNTER — OFFICE VISIT (OUTPATIENT)
Dept: FAMILY MEDICINE | Facility: CLINIC | Age: 43
End: 2018-02-28
Payer: COMMERCIAL

## 2018-02-28 VITALS
TEMPERATURE: 97.6 F | BODY MASS INDEX: 28.53 KG/M2 | DIASTOLIC BLOOD PRESSURE: 100 MMHG | WEIGHT: 156 LBS | SYSTOLIC BLOOD PRESSURE: 150 MMHG | HEART RATE: 68 BPM | OXYGEN SATURATION: 99 %

## 2018-02-28 DIAGNOSIS — I10 BENIGN ESSENTIAL HYPERTENSION: ICD-10-CM

## 2018-02-28 DIAGNOSIS — R07.89 ATYPICAL CHEST PAIN: Primary | ICD-10-CM

## 2018-02-28 PROCEDURE — 99214 OFFICE O/P EST MOD 30 MIN: CPT | Performed by: FAMILY MEDICINE

## 2018-02-28 RX ORDER — AMLODIPINE BESYLATE 5 MG/1
5 TABLET ORAL DAILY
Qty: 30 TABLET | Refills: 1 | Status: SHIPPED | OUTPATIENT
Start: 2018-02-28 | End: 2018-10-25

## 2018-02-28 NOTE — MR AVS SNAPSHOT
After Visit Summary   2/28/2018    Juany Blandon    MRN: [id number]           Patient Information     Date Of Birth          1975        Visit Information        Provider Department      2/28/2018 3:00 PM Nasima Damon MD [facility] [place]        Today's Diagnoses     Benign essential hypertension    -  1       Follow-ups after your visit        Follow-up notes from your care team     Return in about 1 day (around 3/1/2018) for Blood Pressure Recheck with Dr. SCHULTE or Nelson.      Your next 10 appointments already scheduled     Mar 01, 2018  9:40 AM CST   Office Visit with [facility]te [facility]   [facility] ([address] [address] [phone]           Bring a current list of meds and any records pertaining to this visit. For Physicals, please bring immunization records and any forms needing to be filled out. Please arrive 10 minutes early to complete paperwork.            Mar 26, 2018 10:00 AM CDT   Office Visit with Korina Schulte MD   [facility] [place] ([facility])    [address] [place] [id number] [phone]           Bring a current list of meds and any records pertaining to this visit. For Physicals, please bring immunization records and any forms needing to be filled out. Please arrive 10 minutes early to complete paperwork.              Who to contact     If you have questions or need follow up information about today's clinic visit or your schedule please contact [facility] [place] directly at [phone].  Normal or non-critical lab and imaging results will be communicated to you by MyChart, letter or phone within 4 business days after the clinic has received the results. If you do not hear from us within 7 days, please contact the clinic through MyChart or phone. If you have a critical or abnormal lab result, we  will notify you by phone as soon as possible.  Submit refill requests through [employer] or call your pharmacy and they will forward the refill request to us. Please allow 3 business days for your refill to be completed.          Additional Information About Your Visit        [employer] Information     [employer] gives you secure access to your electronic health record. If you see a primary care provider, you can also send messages to your care team and make appointments. If you have questions, please call your primary care clinic.  If you do not have a primary care provider, please call [phone] and they will assist you.        Care EveryWhere ID     This is your Care EveryWhere ID. This could be used by other organizations to access your [place] medical records  [id number]        Your Vitals Were     Pulse Temperature Last Period Pulse Oximetry BMI (Body Mass Index)       68 97.6  F (36.4  C) (Tympanic) 02/26/2018 (Exact Date) 99% 28.53 kg/m2        Blood Pressure from Last 3 Encounters:   02/28/18 (!) 148/100   02/26/18 128/84   02/25/18 (!) 161/110    Weight from Last 3 Encounters:   02/28/18 156 lb (70.8 kg)   02/26/18 156 lb (70.8 kg)   02/25/18 160 lb (72.6 kg)              Today, you had the following     No orders found for display         Today's Medication Changes          These changes are accurate as of 2/28/18  3:27 PM.  If you have any questions, ask your nurse or doctor.               Start taking these medicines.        Dose/Directions    amLODIPine 5 MG tablet   Commonly known as:  NORVASC   Used for:  Benign essential hypertension   Started by:  Nasima Damon MD        Dose:  5 mg   Take 1 tablet (5 mg) by mouth daily   Quantity:  30 tablet   Refills:  1         Stop taking these medicines if you haven't already. Please contact your care team if you have questions.     naproxen 500 MG tablet   Commonly known as:  NAPROSYN   Stopped by:  Nasima Damon MD                Where to get your medicines       These medications were sent to [id number] - [place], [place] - [address] AT [facility] OF  & [facility]  [address], [place] [address]     Phone:  [phone]     amLODIPine 5 MG tablet                Primary Care Provider Office Phone # Fax #    Korina Brant Garcia [phone]       [address]  [place] [address]        Equal Access to Services     [facility]: Hadii aad ku hadasho Soomaali, waaxda luqadaha, qaybta kaalmada adeegyada, waxay idiin hayaan adeeg kharash la'aan . So [facility] [phone].    ATENCIÓN: Si habla español, tiene a jacques disposición servicios gratuitos de asistencia lingüística. [facility] [phone].    We comply with applicable federal civil rights laws and [place] laws. We do not discriminate on the basis of race, color, national origin, age, disability, sex, sexual orientation, or gender identity.            Thank you!     Thank you for choosing [facility] [place]  for your care. Our goal is always to provide you with excellent care. Hearing back from our patients is one way we can continue to improve our services. Please take a few minutes to complete the written survey that you may receive in the mail after your visit with us. Thank you!             Your Updated Medication List - Protect others around you: Learn how to safely use, store and throw away your medicines at www.disposemymeds.org.          This list is accurate as of 2/28/18  3:27 PM.  Always use your most recent med list.                   Brand Name Dispense Instructions for use Diagnosis    amLODIPine 5 MG tablet    NORVASC    30 tablet    Take 1 tablet (5 mg) by mouth daily    Benign essential hypertension       aspirin 81 MG tablet      Take 81 mg by mouth daily        labetalol 100 MG tablet    NORMODYNE    60 tablet    TAKE 1 TABLET(100 MG) BY MOUTH TWICE DAILY    Essential hypertension, HTN, goal below 140/90        prenatal multivitamin plus iron 27-0.8 MG Tabs per tablet     100 tablet    Take 1 tablet by mouth daily    Pregnancy test positive

## 2018-02-28 NOTE — NURSING NOTE
"Chief Complaint   Patient presents with     Chest Pain       Initial BP (!) 148/100  Pulse 68  Temp 97.6  F (36.4  C) (Tympanic)  Wt 156 lb (70.8 kg)  LMP 02/26/2018 (Exact Date)  SpO2 99%  BMI 28.53 kg/m2 Estimated body mass index is 28.53 kg/(m^2) as calculated from the following:    Height as of 2/26/18: 5' 2\" (1.575 m).    Weight as of this encounter: 156 lb (70.8 kg).  Medication Reconciliation: complete    Current Outpatient Prescriptions   Medication Sig Dispense Refill     aspirin 81 MG tablet Take 81 mg by mouth daily       labetalol (NORMODYNE) 100 MG tablet TAKE 1 TABLET(100 MG) BY MOUTH TWICE DAILY 60 tablet 3     Prenatal Vit-Fe Fumarate-FA (PRENATAL MULTIVITAMIN PLUS IRON) 27-0.8 MG TABS per tablet Take 1 tablet by mouth daily 100 tablet 3     naproxen (NAPROSYN) 500 MG tablet Take 1 tablet (500 mg) by mouth 2 times daily (with meals) (Patient not taking: Reported on 2/28/2018) 30 tablet 0       Ethan FISHMAN, HAYDEN  "

## 2018-02-28 NOTE — PROGRESS NOTES
SUBJECTIVE:   Juany Blandon is a 42 year old female who presents to clinic today for the following health issues:      CHEST PAIN     Onset: 2-3 days     Description:   Location:  left side  Character: achey  Radiation: from left side over shoulder  Duration: waxing and waning     Intensity: moderate    Progression of Symptoms:  waxing and waning    Accompanying Signs & Symptoms:  Shortness of breath: YES  Sweating: no   Nausea/vomiting: no   Lightheadedness: YES  Palpitations: YES  Fever/Chills: no   Cough: no   Heartburn: no     History:   Family history of heart disease no   Tobacco use: no     Precipitating factors:   Worse with exertion: no   Worse with deep breaths :  YES  Related to food: no        Therapies Tried and outcome: baby asa     Patient has history of hypertension.  Blood pressures been running high.  Denies any exertional pain, associated diaphoresis.  Patient was seen by the emergency department recently, records were reviewed.  Patient had a EKG which was normal.  Labs are normal.  She had a carotid ultrasound which came back less than 50% stenosis.  That was done to workup the dizziness..        Problem list and histories reviewed & adjusted, as indicated.  Additional history: as documented    Patient Active Problem List   Diagnosis     JOINT PAIN-MULT JTS, W/U NEG ~     CARDIOVASCULAR SCREENING; LDL GOAL LESS THAN 160     Former smoker     Lumbar radiculopathy     Gastroesophageal reflux disease, esophagitis presence not specified     Seasonal allergic rhinitis     Essential hypertension with goal blood pressure less than 140/90     BMI 27.0-27.9,adult     Atherosclerosis of both carotid arteries     Precordial pain     Past Surgical History:   Procedure Laterality Date     C NONSPECIFIC PROCEDURE      L BREAST BX, bening 2014       SECTION             Social History   Substance Use Topics     Smoking status: Former Smoker     Packs/day: 0.50     Years: 8.00      Types: Cigarettes     Start date:      Quit date: 2010     Smokeless tobacco: Never Used     Alcohol use No     Family History   Problem Relation Age of Onset     Blood Disease Father      non higd lymphoma     Gynecology Mother      fibroids     Lipids Mother      Hypertension Mother      DIABETES Maternal Grandmother      Arthritis Paternal Grandmother      CEREBROVASCULAR DISEASE Maternal Grandfather       of stroke at age 50+     C.A.D. No family hx of      Breast Cancer No family hx of      Cancer - colorectal No family hx of          Current Outpatient Prescriptions   Medication Sig Dispense Refill     aspirin 81 MG tablet Take 81 mg by mouth daily       amLODIPine (NORVASC) 5 MG tablet Take 1 tablet (5 mg) by mouth daily 30 tablet 1     labetalol (NORMODYNE) 100 MG tablet TAKE 1 TABLET(100 MG) BY MOUTH TWICE DAILY 60 tablet 3     Prenatal Vit-Fe Fumarate-FA (PRENATAL MULTIVITAMIN PLUS IRON) 27-0.8 MG TABS per tablet Take 1 tablet by mouth daily 100 tablet 3     omeprazole (PRILOSEC) 20 MG CR capsule Take 1 capsule (20 mg) by mouth daily 90 capsule 3     atorvastatin (LIPITOR) 10 MG tablet Take 1 tablet (10 mg) by mouth daily 30 tablet 1     Allergies   Allergen Reactions     No Known Allergies        Reviewed and updated as needed this visit by clinical staff  Tobacco  Allergies  Meds  Soc Hx      Reviewed and updated as needed this visit by Provider         ROS:  CONSTITUTIONAL: NEGATIVE for fever, chills, change in weight  INTEGUMENTARY/SKIN: NEGATIVE for worrisome rashes, moles or lesions  GI: NEGATIVE for nausea, abdominal pain, heartburn, or change in bowel habits  PSYCHIATRIC: NEGATIVE for changes in mood or affect    OBJECTIVE:                                                    BP (!) 150/100  Pulse 68  Temp 97.6  F (36.4  C) (Tympanic)  Wt 156 lb (70.8 kg)  LMP 2018 (Exact Date)  SpO2 99%  BMI 28.53 kg/m2  Body mass index is 28.53 kg/(m^2).   GENERAL: healthy, alert, well  nourished, well hydrated, no distress  HENT: ear canals- normal; TMs- normal; Nose- normal; Mouth- no ulcers, no lesions  NECK: no tenderness, no adenopathy, no asymmetry, no masses, no stiffness; thyroid- normal to palpation  RESP: lungs clear to auscultation - no rales, no rhonchi, no wheezes  CV: regular rates and rhythm, normal S1 S2, no S3 or S4 and no murmur, no click or rub -  ABDOMEN: soft, no tenderness, no  hepatosplenomegaly, no masses, normal bowel sounds  MS: extremities- no gross deformities noted, no edema         ASSESSMENT/PLAN:                                                        ICD-10-CM    1. Atypical chest pain R07.89    2. Benign essential hypertension I10 amLODIPine (NORVASC) 5 MG tablet     Patient noted to have elevated blood pressure.  That could be the cause of her atypical chest discomfort she is noticing.  GERD cannot be ruled out either.  Recommended to continue using aspirin.  Starting patient on amlodipine 5 mg daily.  She has an appointment with her primary care physician in the next few days.  Recommended to be seen for blood pressure recheck with PCP.  Currently patient is asymptomatic.Patient has recently been at the emergency room for evaluation.  She had an EKG and a carotid ultrasound which came back within normal limits.  Patient has a stress echocardiogram in 2017 which was essentially normal.  After reviewing all these details, I have not recommended any investigation.  If any symptomatic worsening noted, instructed to go to the ER again.  Once the blood pressure is improved, I would expect resolution of symptoms.  If symptoms do not resolve, may consider stress test.  Patient verbalized understanding and agreement with the plan.      Nasima Damon MD  [facility]

## 2018-03-01 ENCOUNTER — OFFICE VISIT (OUTPATIENT)
Dept: FAMILY MEDICINE | Facility: CLINIC | Age: 43
End: 2018-03-01
Payer: COMMERCIAL

## 2018-03-01 VITALS
OXYGEN SATURATION: 99 % | BODY MASS INDEX: 28.71 KG/M2 | HEART RATE: 66 BPM | DIASTOLIC BLOOD PRESSURE: 74 MMHG | SYSTOLIC BLOOD PRESSURE: 120 MMHG | TEMPERATURE: 97 F | WEIGHT: 156 LBS | HEIGHT: 62 IN

## 2018-03-01 DIAGNOSIS — K21.9 GASTROESOPHAGEAL REFLUX DISEASE, ESOPHAGITIS PRESENCE NOT SPECIFIED: ICD-10-CM

## 2018-03-01 DIAGNOSIS — I10 HTN, GOAL BELOW 140/90: Primary | ICD-10-CM

## 2018-03-01 DIAGNOSIS — I65.23 ATHEROSCLEROSIS OF BOTH CAROTID ARTERIES: ICD-10-CM

## 2018-03-01 DIAGNOSIS — R07.89 CHEST TIGHTNESS: ICD-10-CM

## 2018-03-01 DIAGNOSIS — E78.5 HYPERLIPIDEMIA LDL GOAL <130: ICD-10-CM

## 2018-03-01 PROCEDURE — 99214 OFFICE O/P EST MOD 30 MIN: CPT | Performed by: FAMILY MEDICINE

## 2018-03-01 RX ORDER — ATORVASTATIN CALCIUM 10 MG/1
10 TABLET, FILM COATED ORAL DAILY
Qty: 30 TABLET | Refills: 1 | Status: SHIPPED | OUTPATIENT
Start: 2018-03-01 | End: 2018-06-20

## 2018-03-01 NOTE — NURSING NOTE
"No chief complaint on file.      Initial /90  Pulse 66  Temp 97  F (36.1  C) (Tympanic)  Ht 5' 2\" (1.575 m)  Wt 156 lb (70.8 kg)  LMP 02/26/2018 (Exact Date)  SpO2 99%  BMI 28.53 kg/m2 Estimated body mass index is 28.53 kg/(m^2) as calculated from the following:    Height as of this encounter: 5' 2\" (1.575 m).    Weight as of this encounter: 156 lb (70.8 kg).  Medication Reconciliation: complete  "

## 2018-03-01 NOTE — PROGRESS NOTES
SUBJECTIVE:   Juany Blandon is a 42 year old female who presents to clinic today for the following health issues:      Hypertension Follow-up      Outpatient blood pressures are being checked at here.  Results  are better now    Low Salt Diet: low salt      Amount of exercise or physical activity: None    Problems taking medications regularly: No    Medication side effects: none    Diet: regular (no restrictions)        PROBLEMS TO ADD ON...  Also had carotid ultrasound done recently here to discuss results.  She was seen yesterday for some chest tightness and was seen by another provider yesterday. although she was told she is okay just wanted to a follow-up check.  She admits that she is having a lot more heartburn indigestion kind of symptoms lately too.  Not really taking her Prilosec.  No sharp abdominal pain.  Check chest tightness she has no associated palpitations dizziness or significant chest pain.  Denies any cough or URI symptoms.  She is feeling a little bit anxious and stressed because of the recent hospital visit but she thinks she is doing okay.    She has an appointment with the neurology next week , because of the persistent facial numbness that she has experienced although MRI brain was okay in the ER recently    Problem list and histories reviewed & adjusted, as indicated.  Additional history: as documented    Patient Active Problem List   Diagnosis     JOINT PAIN-MULT JTS, W/U NEG ~     Essential hypertension     CARDIOVASCULAR SCREENING; LDL GOAL LESS THAN 160     Former smoker     Lumbar radiculopathy     HTN, goal below 140/90     Gastroesophageal reflux disease, esophagitis presence not specified     Seasonal allergic rhinitis     Essential hypertension with goal blood pressure less than 140/90     BMI 27.0-27.9,adult     Past Surgical History:   Procedure Laterality Date     C NONSPECIFIC PROCEDURE      L BREAST BX, fela 2014       SECTION             Social  "History   Substance Use Topics     Smoking status: Former Smoker     Packs/day: 0.50     Types: Cigarettes     Quit date: 2010     Smokeless tobacco: Never Used     Alcohol use No     Family History   Problem Relation Age of Onset     Blood Disease Father      non higd lymphoma     Gynecology Mother      fibroids     Lipids Mother      Hypertension Mother      DIABETES Maternal Grandmother      Arthritis Paternal Grandmother      CEREBROVASCULAR DISEASE Maternal Grandfather       of stroke at age 50+     C.A.D. No family hx of      Breast Cancer No family hx of      Cancer - colorectal No family hx of            Reviewed and updated as needed this visit by clinical staff  Tobacco  Allergies  Meds       Reviewed and updated as needed this visit by Provider         ROS:  Constitutional, HEENT, cardiovascular, pulmonary, GI, , musculoskeletal, neuro, skin, endocrine and psych systems are negative, except as otherwise noted.    OBJECTIVE:     /74  Pulse 66  Temp 97  F (36.1  C) (Tympanic)  Ht 5' 2\" (1.575 m)  Wt 156 lb (70.8 kg)  LMP 2018 (Exact Date)  SpO2 99%  BMI 28.53 kg/m2  Body mass index is 28.53 kg/(m^2).  GENERAL: healthy, alert and no distress  EYES: Eyes grossly normal to inspection, PERRL and conjunctivae and sclerae normal  HENT: oropharynx clear and oral mucous membranes moist  NECK: no adenopathy  RESP: lungs clear to auscultation - no rales, rhonchi or wheezes  CV: regular rate and rhythm, normal S1 S2, no S3 or S4, no murmur, click or rub, no peripheral edema and peripheral pulses strong  ABDOMEN: soft, nontender, no hepatosplenomegaly, no masses and bowel sounds normal  MS: no edema  NEURO: Normal strength and tone, mentation intact and speech normal    Diagnostic Test Results:  none     ASSESSMENT/PLAN:       (I10) HTN, goal below 140/90  (primary encounter diagnosis)  Comment:   Plan: CARDIOLOGY EVAL ADULT REFERRAL            (R07.89) Chest tightness  Comment:   Plan: " CARDIOLOGY EVAL ADULT REFERRAL            (E78.5) Hyperlipidemia LDL goal <130  Comment:   Plan: atorvastatin (LIPITOR) 10 MG tablet, Lipid         Profile (Chol, Trig, HDL, LDL calc), CANCELED:         Lipid Profile (Chol, Trig, HDL, LDL calc)            (K21.9) Gastroesophageal reflux disease, esophagitis presence not specified  Comment:   Plan: omeprazole (PRILOSEC) 20 MG CR capsule            Discussed cares and concerns with the patient.  She has less than 50% atherosclerosis in both carotids.  Lipid has been mildly elevated previously as well.  She is fasting today so we will check another level today although she is willing to start statin.  Pros and cons of medication discussed.    Chest tightness sounds like it is probably noncardiac and could be acid reflux  She is willing to try the Prilosec and see if that helps.  Also because of her risk factors I have given referral to see a cardiologist to further evaluate.  She will follow-up in 4 weeks sooner if any problem.  Patient expressed understanding and agreement with treatment plan. All patient's questions were answered, will let me know if has more later.  Medications: Rx's: Reviewed the potential side effects/complications of medications prescribed.     Korina Garcia MD  [facility]

## 2018-03-01 NOTE — PATIENT INSTRUCTIONS
Referral given  Take medications as directed.  Cares and symptomatic cares discussed   Follow up in 4-6 weeks, sooner,  if problem or concern

## 2018-03-01 NOTE — NURSING NOTE
"Chief Complaint   Patient presents with     Hypertension       Initial /90  Pulse 66  Temp 97  F (36.1  C) (Tympanic)  Ht 5' 2\" (1.575 m)  Wt 156 lb (70.8 kg)  LMP 02/26/2018 (Exact Date)  SpO2 99%  BMI 28.53 kg/m2 Estimated body mass index is 28.53 kg/(m^2) as calculated from the following:    Height as of this encounter: 5' 2\" (1.575 m).    Weight as of this encounter: 156 lb (70.8 kg).  Medication Reconciliation: complete  "

## 2018-03-01 NOTE — MR AVS SNAPSHOT
After Visit Summary   3/1/2018    Juany Blandon    MRN: [id number]           Patient Information     Date Of Birth          1975        Visit Information        Provider Department      3/1/2018 9:40 AM Korina Garcia MD [facility]        Today's Diagnoses     HTN, goal below 140/90    -  1    Chest tightness        Hyperlipidemia LDL goal <130        Gastroesophageal reflux disease, esophagitis presence not specified          Care Instructions    Referral given  Take medications as directed.  Cares and symptomatic cares discussed   Follow up in 4-6 weeks, sooner,  if problem or concern             Follow-ups after your visit        Additional Services     CARDIOLOGY EVAL ADULT REFERRAL       Preferred location:  [facility] [phone]   [email or web address]    Please be aware that coverage of these services is subject to the terms and limitations of your health insurance plan.  Call member services at your health plan with any benefit or coverage questions.      Please bring the following to your appointment:  Any x-rays, CTs or MRIs which have been performed. Contact the facility where they were done to arrange for  prior to your scheduled appointment.    List of current medications  This referral request   Any documents/labs given to you for this referral                  Follow-up notes from your care team     Return in about 4 weeks (around 3/29/2018), or if symptoms worsen or fail to improve.      Your next 10 appointments already scheduled     Mar 26, 2018 10:00 AM CDT   Office Visit with Korina Garcia MD   [facility] [place] ([facility]    [address] [place] [id number] [phone]           Bring a current list of meds and any records pertaining to this visit. For Physicals, please bring immunization records and any  "forms needing to be filled out. Please arrive 10 minutes early to complete paperwork.              Who to contact     If you have questions or need follow up information about today's clinic visit or your schedule please contact [facility] [place] directly at [phone].  Normal or non-critical lab and imaging results will be communicated to you by MyChart, letter or phone within 4 business days after the clinic has received the results. If you do not hear from us within 7 days, please contact the clinic through [employer] or phone. If you have a critical or abnormal lab result, we will notify you by phone as soon as possible.  Submit refill requests through [employer] or call your pharmacy and they will forward the refill request to us. Please allow 3 business days for your refill to be completed.          Additional Information About Your Visit        [employer] Information     [employer] gives you secure access to your electronic health record. If you see a primary care provider, you can also send messages to your care team and make appointments. If you have questions, please call your primary care clinic.  If you do not have a primary care provider, please call [phone] and they will assist you.        Care EveryWhere ID     This is your Care EveryWhere ID. This could be used by other organizations to access your [place] medical records  [id number]        Your Vitals Were     Pulse Temperature Height Last Period Pulse Oximetry BMI (Body Mass Index)    66 97  F (36.1  C) (Tympanic) 5' 2\" (1.575 m) 02/26/2018 (Exact Date) 99% 28.53 kg/m2       Blood Pressure from Last 3 Encounters:   03/01/18 120/74   02/28/18 (!) 150/100   02/26/18 128/84    Weight from Last 3 Encounters:   03/01/18 156 lb (70.8 kg)   02/28/18 156 lb (70.8 kg)   02/26/18 156 lb (70.8 kg)              We Performed the Following     CARDIOLOGY EVAL ADULT REFERRAL     Lipid Profile (Chol, Trig, HDL, LDL calc)          Today's Medication " Changes          These changes are accurate as of 3/1/18 10:40 AM.  If you have any questions, ask your nurse or doctor.               Start taking these medicines.        Dose/Directions    atorvastatin 10 MG tablet   Commonly known as:  LIPITOR   Used for:  Hyperlipidemia LDL goal <130   Started by:  Korina Garcia MD        Dose:  10 mg   Take 1 tablet (10 mg) by mouth daily   Quantity:  30 tablet   Refills:  1       omeprazole 20 MG CR capsule   Commonly known as:  priLOSEC   Used for:  Gastroesophageal reflux disease, esophagitis presence not specified   Started by:  Korina Garcia MD        Dose:  20 mg   Take 1 capsule (20 mg) by mouth daily   Quantity:  90 capsule   Refills:  3            Where to get your medicines      These medications were sent to [id number] - [place], [place] - [address]  AT [address]  [address], [place] [address]     Phone:  [phone]     atorvastatin 10 MG tablet    omeprazole 20 MG CR capsule                Primary Care Provider Office Phone # Fax #    Korina Garcia [phone]       [address]  [place] [address]        Equal Access to Services     MELISSA COURTNEY AH: Hadii vanessa ku hadasho Soomaali, waaxda luqadaha, qaybta kaalmada adeegyada, waxay brianin hayaan esmer gee. So [facility] [phone].    ATENCIÓN: Si habla español, tiene a jacques disposición servicios gratuitos de asistencia lingüística. Llame al [phone].    We comply with applicable federal civil rights laws and [place] laws. We do not discriminate on the basis of race, color, national origin, age, disability, sex, sexual orientation, or gender identity.            Thank you!     Thank you for choosing [facility] [place]  for your care. Our goal is always to provide you with excellent care. Hearing back from our patients is one way we can continue to improve our services. Please  take a few minutes to complete the written survey that you may receive in the mail after your visit with us. Thank you!             Your Updated Medication List - Protect others around you: Learn how to safely use, store and throw away your medicines at www.disposemymeds.org.          This list is accurate as of 3/1/18 10:40 AM.  Always use your most recent med list.                   Brand Name Dispense Instructions for use Diagnosis    amLODIPine 5 MG tablet    NORVASC    30 tablet    Take 1 tablet (5 mg) by mouth daily    Benign essential hypertension       aspirin 81 MG tablet      Take 81 mg by mouth daily        atorvastatin 10 MG tablet    LIPITOR    30 tablet    Take 1 tablet (10 mg) by mouth daily    Hyperlipidemia LDL goal <130       labetalol 100 MG tablet    NORMODYNE    60 tablet    TAKE 1 TABLET(100 MG) BY MOUTH TWICE DAILY    Essential hypertension, HTN, goal below 140/90       omeprazole 20 MG CR capsule    priLOSEC    90 capsule    Take 1 capsule (20 mg) by mouth daily    Gastroesophageal reflux disease, esophagitis presence not specified       prenatal multivitamin plus iron 27-0.8 MG Tabs per tablet     100 tablet    Take 1 tablet by mouth daily    Pregnancy test positive

## 2018-03-02 ENCOUNTER — PRE VISIT (OUTPATIENT)
Dept: CARDIOLOGY | Facility: CLINIC | Age: 43
End: 2018-03-02

## 2018-03-05 ENCOUNTER — TRANSFERRED RECORDS (OUTPATIENT)
Dept: HEALTH INFORMATION MANAGEMENT | Facility: CLINIC | Age: 43
End: 2018-03-05

## 2018-03-06 ENCOUNTER — OFFICE VISIT (OUTPATIENT)
Dept: CARDIOLOGY | Facility: CLINIC | Age: 43
End: 2018-03-06
Attending: FAMILY MEDICINE
Payer: COMMERCIAL

## 2018-03-06 VITALS
HEART RATE: 72 BPM | DIASTOLIC BLOOD PRESSURE: 70 MMHG | SYSTOLIC BLOOD PRESSURE: 108 MMHG | HEIGHT: 62 IN | WEIGHT: 152.1 LBS | BODY MASS INDEX: 27.99 KG/M2

## 2018-03-06 DIAGNOSIS — R07.2 PRECORDIAL PAIN: ICD-10-CM

## 2018-03-06 DIAGNOSIS — I10 HTN, GOAL BELOW 140/90: Primary | ICD-10-CM

## 2018-03-06 DIAGNOSIS — I10 ESSENTIAL HYPERTENSION WITH GOAL BLOOD PRESSURE LESS THAN 140/90: ICD-10-CM

## 2018-03-06 PROCEDURE — 99203 OFFICE O/P NEW LOW 30 MIN: CPT | Performed by: INTERNAL MEDICINE

## 2018-03-06 NOTE — PROGRESS NOTES
HPI and Plan:   See dictation    Orders Placed This Encounter   Procedures     US Renal Complete w Doppler Complete     Exercise Stress Echocardiogram       No orders of the defined types were placed in this encounter.      There are no discontinued medications.      Encounter Diagnoses   Name Primary?     HTN, goal below 140/90 Yes     Precordial pain      Essential hypertension with goal blood pressure less than 140/90      BMI 27.0-27.9,adult        CURRENT MEDICATIONS:  Current Outpatient Prescriptions   Medication Sig Dispense Refill     omeprazole (PRILOSEC) 20 MG CR capsule Take 1 capsule (20 mg) by mouth daily 90 capsule 3     atorvastatin (LIPITOR) 10 MG tablet Take 1 tablet (10 mg) by mouth daily 30 tablet 1     aspirin 81 MG tablet Take 81 mg by mouth daily       amLODIPine (NORVASC) 5 MG tablet Take 1 tablet (5 mg) by mouth daily 30 tablet 1     labetalol (NORMODYNE) 100 MG tablet TAKE 1 TABLET(100 MG) BY MOUTH TWICE DAILY 60 tablet 3     Prenatal Vit-Fe Fumarate-FA (PRENATAL MULTIVITAMIN PLUS IRON) 27-0.8 MG TABS per tablet Take 1 tablet by mouth daily 100 tablet 3       ALLERGIES     Allergies   Allergen Reactions     No Known Allergies        PAST MEDICAL HISTORY:  Past Medical History:   Diagnosis Date     HX FIBROADENOMA L BREAST      JOINT PAIN-MULT JTS, W/U NEG ~      SPONTANEOUS  03       PAST SURGICAL HISTORY:  Past Surgical History:   Procedure Laterality Date     C NONSPECIFIC PROCEDURE      L BREAST BX, bening 2014       SECTION             FAMILY HISTORY:  Family History   Problem Relation Age of Onset     Blood Disease Father      non higd lymphoma     Gynecology Mother      fibroids     Lipids Mother      Hypertension Mother      DIABETES Maternal Grandmother      Arthritis Paternal Grandmother      CEREBROVASCULAR DISEASE Maternal Grandfather       of stroke at age 50+     C.A.D. No family hx of      Breast Cancer No family hx of      Cancer -  "colorectal No family hx of        SOCIAL HISTORY:  Social History     Social History     Marital status:      Spouse name: CARISSA NOLEN     Number of children: 0     Years of education: N/A     Occupational History     RESEARCH SPECIALIST      WORKS FOR [employer]     Social History Main Topics     Smoking status: Former Smoker     Packs/day: 0.50     Years: 8.00     Types: Cigarettes     Start date: 2002     Quit date: 2/16/2010     Smokeless tobacco: Never Used     Alcohol use No     Drug use: No     Sexual activity: Yes     Partners: Male     Other Topics Concern      Service No     Blood Transfusions No     Caffeine Concern No     Occupational Exposure No     Hobby Hazards No     Sleep Concern Yes     Stress Concern No     Weight Concern No     Special Diet No     Back Care Yes     upper back pain     Exercise No     Bike Helmet No     DOES NOT BIKE OUTSIDE     Seat Belt Yes     Self-Exams Yes     Social History Narrative    , one child, non smoker, no alcohol , working full time        Review of Systems:  Skin:  Negative       Eyes:  Positive for glasses    ENT:  Positive for nasal congestion    Respiratory:  Positive for dyspnea on exertion     Cardiovascular:    chest pain;palpitations;Positive for;dizziness    Gastroenterology: Negative      Genitourinary:  Negative      Musculoskeletal:  Negative      Neurologic:  Positive for numbness or tingling of hands;numbness or tingling of feet    Psychiatric:  Negative      Heme/Lymph/Imm:  Positive for allergies seasonal  Endocrine:  Positive for night sweats      Physical Exam:  Vitals: /70  Pulse 72  Ht 1.575 m (5' 2\")  Wt 69 kg (152 lb 1.6 oz)  LMP 02/26/2018 (Exact Date)  BMI 27.82 kg/m2    Constitutional:  cooperative, alert and oriented, well developed, well nourished, in no acute distress        Skin:  warm and dry to the touch, no apparent skin lesions or masses noted          Head:  normocephalic, no masses or lesions        Eyes:  " pupils equal and round, conjunctivae and lids unremarkable, sclera white, no xanthalasma, EOMS intact, no nystagmus        Lymph:      ENT:  no pallor or cyanosis, dentition good        Neck:  carotid pulses are full and equal bilaterally;no carotid bruit        Respiratory:  normal breath sounds, clear to auscultation, normal A-P diameter, normal symmetry, normal respiratory excursion, no use of accessory muscles         Cardiac: regular rhythm;normal S1 and S2;no murmurs, gallops or rubs detected                pulses full and equal                                        GI:  abdomen soft, non-tender, BS normoactive, no mass, no HSM, no bruits        Extremities and Muscular Skeletal:  no edema;no spinal abnormalities noted;normal muscle strength and tone              Neurological:  no gross motor deficits        Psych:  affect appropriate, oriented to time, person and place        CC  Korina Garcia MD  [address] [place] [place] [address]

## 2018-03-06 NOTE — LETTER
3/6/2018      Korina Garcia MD  [address]  [place] [address]      RE: Juany Guadarramadhury       Dear Colleague,    I had the pleasure of seeing Juany Blandon in the [facility].    Service Date: 2018      HISTORY OF PRESENT ILLNESS:  Ms. Blandon is a very nice 42-year-old woman with hypertension, hypercholesterolemia and carotid bruits with ultrasound last month, demonstrating less than 50% stenoses bilaterally.  She was recently seen in the emergency room for chest pain syndrome and is now referred for further evaluation and treatment.  Review of the chart shows that she did undergo a stress echo in  which was significant for a hypertensive response to exercise, but otherwise appeared to be a normal stress echocardiogram.      Ms. Blandon states that she has no family history of coronary artery disease.  Her mother is alive at 61.  Her father  of cancer at 48.  None of his relatives had heart problems.  She herself is a former smoker, having quit 8-9 years ago, does not have diabetes mellitus.  She has had hypertension for 9 years.  She has hypercholesterolemia for which she was just started on atorvastatin the last week.      Last week she had an episode of tightness that came on at rest.  This was accompanied by shortness of breath.  It was after dinner so it may have been related to food.  She noted no exacerbating or ameliorating features.  She had no diaphoresis, nausea or vomiting.  She was seen in the emergency room where a single troponin was negative.  Electrolytes were within normal limits.  Hemoglobin was 12.7.  She now referred for further evaluation and treatment.      Ms. Blandon states she has no exertional chest, arm, neck, jaw or shoulder discomfort.  She does not have any sort of regular exercise regimen.  She had no lightheadedness, dizziness, syncope or near-syncope.  No dyspnea on exertion, orthopnea or PND, no  problems with ankle edema.        She states prior to that she may have had some diarrhea, but this has also cleared up.  She had no viral syndrome, no changes with deep inspiration or position.      ASSESSMENT AND PLAN:  Ms. Alva has pretty significant hypertension for a young woman.  I will set her up for a renal ultrasound to make sure we are not missing renal artery stenosis and make sure her kidneys looked fine.  As stated, creatinine was 0.97, giving her a GFR of 63.  Blood pressure is well controlled today with a blood pressure of 108/70 with a pulse of 72.      Chest pain syndrome sounds quite atypical, although she insists she was not under any stress or emotional upset.  I will set her up for a stress echocardiogram to evaluate for underlying coronary artery disease.  I will leave her on her beta blocker.  I would like to see what her blood pressure and heart rate response is.  I suspect blood pressure may be the key issue here.  We will also be able to look for any left ventricular hypertrophy or any end-organ damage from her high blood pressure.      If her stress test is indeed negative for ischemia, then I would set her up for a calcium score to make sure she does not have an underlying coronary artery disease.  If her calcium score puts her at high risk, then I would get very aggressive with her cholesterol management.  As stated, her fasting lipid profile from July shows total cholesterol 190 with HDL 44, LDL of 120 and triglycerides of 130.  She clearly has HDL deficiency.  She has been started on atorvastatin 10 mg.  She has not had any blood work done since then.      I emphasized the importance of a regular exercise regimen, a Mediterranean-style diet.  Further evaluation and treatment will depend upon the above results.         LETI GOINS MD, FACC             D: 03/06/2018   T: 03/06/2018   MT: ANKUSH      Name:     MARIA ISABEL ALVA   MRN:      [id number]        Account:       [id number]   :      1975           Service Date: 2018      Document: [id number]         Outpatient Encounter Prescriptions as of 3/6/2018   Medication Sig Dispense Refill     omeprazole (PRILOSEC) 20 MG CR capsule Take 1 capsule (20 mg) by mouth daily 90 capsule 3     atorvastatin (LIPITOR) 10 MG tablet Take 1 tablet (10 mg) by mouth daily 30 tablet 1     aspirin 81 MG tablet Take 81 mg by mouth daily       amLODIPine (NORVASC) 5 MG tablet Take 1 tablet (5 mg) by mouth daily 30 tablet 1     labetalol (NORMODYNE) 100 MG tablet TAKE 1 TABLET(100 MG) BY MOUTH TWICE DAILY 60 tablet 3     Prenatal Vit-Fe Fumarate-FA (PRENATAL MULTIVITAMIN PLUS IRON) 27-0.8 MG TABS per tablet Take 1 tablet by mouth daily 100 tablet 3     No facility-administered encounter medications on file as of 3/6/2018.        Again, thank you for allowing me to participate in the care of your patient.      Sincerely,    Mack Puckett MD     [facility]

## 2018-03-06 NOTE — MR AVS SNAPSHOT
After Visit Summary   3/6/2018    Juany Blandon    MRN: [id number]           Patient Information     Date Of Birth          1975        Visit Information        Provider Department      3/6/2018 11:15 AM Mack Puckett MD [facility]        Today's Diagnoses     HTN, goal below 140/90    -  1    Precordial pain        Essential hypertension with goal blood pressure less than 140/90        BMI 27.0-27.9,adult           Follow-ups after your visit        Your next 10 appointments already scheduled     Mar 13, 2018  9:40 AM CDT   (Arrive by 9:25 AM)   US RENAL COMPLETE WITH DUPLEX COMPLETE with SHVUS4   [facility] ([facility])    [address] [place] [address]  [address]  [facility] [address]   [phone]           Please bring a list of your medicines (including vitamins, minerals and over-the-counter drugs). Also, tell your doctor about any allergies you may have. Wear comfortable clothes and leave your valuables at home.  Adults: No eating or drinking for 8 hours before the exam. You may take medicine with a small sip of water.  Children: - Children 6+ years: No food or drink for 6 hours before exam. - Children 1-5 years: No food or drink for 4 hours before exam. - Infants, breast-fed: may have breast milk up to 2 hours before exam. - Infants, formula: may have bottle until 4 hours before exam.  Please call the Imaging Department at your exam site with any questions.            Mar 14, 2018 11:00 AM CDT   Ech Stress Test with [id number]   [facility] CV Echocardiography ([facility])    [address]  [address]  [facility] [id number] [phone]           1. Please bring or wear a comfortable two-piece outfit and walking shoes. 2. Stop eating 3 hours before the test. You may drink water or juice. 3. Stop all caffeine 12 hours before the test. This  includes coffee, tea, soda pop, chocolate and certain medicines (such as Anacin and Excederin). Also avoid decaf coffee and tea, as these contain small amounts of caffeine. 4. No alcohol, smoking or use of other tobacco products for 12 hours before the test. 5. Refer to your provider instructions to see if you need to stop any medications (such as beta-blockers or nitrates) for this test. 6. For patients with diabetes: - If you take insulin, call your diabetes care team. Ask if you should take a   dose the morning of your test. - If you take diabetes medicine by mouth, dont take it on the morning of your test. Bring it with you to take after the test. (If you have questions, call your diabetes care team) 7. When you arrive, please tell us if: - You have diabetes. - You have taken Viagra, Cialis or Levitra in the past 48 hours. 8. For any questions that cannot be answered, please contact the ordering physician            Mar 26, 2018 10:00 AM CDT   Office Visit with Korina Garcia MD   [facility] ([address] [address] [phone]           Bring a current list of meds and any records pertaining to this visit. For Physicals, please bring immunization records and any forms needing to be filled out. Please arrive 10 minutes early to complete paperwork.              Future tests that were ordered for you today     Open Future Orders        Priority Expected Expires Ordered    US Renal Complete w Doppler Complete Routine 3/13/2018 3/6/2019 3/6/2018    Exercise Stress Echocardiogram Routine 3/13/2018 3/6/2019 3/6/2018            Who to contact     If you have questions or need follow up information about today's clinic visit or your schedule please contact [facility] directly at [phone].  Normal or non-critical lab and imaging results will be communicated to you by MyChart, letter or  "phone within 4 business days after the clinic has received the results. If you do not hear from us within 7 days, please contact the clinic through [employer] or phone. If you have a critical or abnormal lab result, we will notify you by phone as soon as possible.  Submit refill requests through [employer] or call your pharmacy and they will forward the refill request to us. Please allow 3 business days for your refill to be completed.          Additional Information About Your Visit        [employer] Information     [employer] gives you secure access to your electronic health record. If you see a primary care provider, you can also send messages to your care team and make appointments. If you have questions, please call your primary care clinic.  If you do not have a primary care provider, please call [phone] and they will assist you.        Care EveryWhere ID     This is your Care EveryWhere ID. This could be used by other organizations to access your [facility] medical records  [id number]        Your Vitals Were     Pulse Height Last Period BMI (Body Mass Index)          72 1.575 m (5' 2\") 02/26/2018 (Exact Date) 27.82 kg/m2         Blood Pressure from Last 3 Encounters:   03/06/18 108/70   03/01/18 120/74   02/28/18 (!) 150/100    Weight from Last 3 Encounters:   03/06/18 69 kg (152 lb 1.6 oz)   03/01/18 70.8 kg (156 lb)   02/28/18 70.8 kg (156 lb)               Primary Care Provider Office Phone # Fax #    Korina Brant Garcia [phone]       [address]  [place] [address]        Equal Access to Services     [facility]: Hadii vanessa graham hadasho Sowaqas, waaxda luqadaha, qaybta kaalnettie bah. So [facility] [phone].    ATENCIÓN: Si habla español, tiene a jacques disposición servicios gratuitos de asistencia lingüística. Llame al [phone].    We comply with applicable federal civil rights laws and [place] laws. We do not discriminate on the " basis of race, color, national origin, age, disability, sex, sexual orientation, or gender identity.            Thank you!     Thank you for choosing [facility]  for your care. Our goal is always to provide you with excellent care. Hearing back from our patients is one way we can continue to improve our services. Please take a few minutes to complete the written survey that you may receive in the mail after your visit with us. Thank you!             Your Updated Medication List - Protect others around you: Learn how to safely use, store and throw away your medicines at www.disposemymeds.org.          This list is accurate as of 3/6/18 12:08 PM.  Always use your most recent med list.                   Brand Name Dispense Instructions for use Diagnosis    amLODIPine 5 MG tablet    NORVASC    30 tablet    Take 1 tablet (5 mg) by mouth daily    Benign essential hypertension       aspirin 81 MG tablet      Take 81 mg by mouth daily        atorvastatin 10 MG tablet    LIPITOR    30 tablet    Take 1 tablet (10 mg) by mouth daily    Hyperlipidemia LDL goal <130       labetalol 100 MG tablet    NORMODYNE    60 tablet    TAKE 1 TABLET(100 MG) BY MOUTH TWICE DAILY    Essential hypertension, HTN, goal below 140/90       omeprazole 20 MG CR capsule    priLOSEC    90 capsule    Take 1 capsule (20 mg) by mouth daily    Gastroesophageal reflux disease, esophagitis presence not specified       prenatal multivitamin plus iron 27-0.8 MG Tabs per tablet     100 tablet    Take 1 tablet by mouth daily    Pregnancy test positive

## 2018-03-06 NOTE — PROGRESS NOTES
Service Date: 2018      HISTORY OF PRESENT ILLNESS:  Ms. Blandon is a very nice 42-year-old woman with hypertension, hypercholesterolemia and carotid bruits with ultrasound last month, demonstrating less than 50% stenoses bilaterally.  She was recently seen in the emergency room for chest pain syndrome and is now referred for further evaluation and treatment.  Review of the chart shows that she did undergo a stress echo in  which was significant for a hypertensive response to exercise, but otherwise appeared to be a normal stress echocardiogram.      Ms. Blandon states that she has no family history of coronary artery disease.  Her mother is alive at 61.  Her father  of cancer at 48.  None of his relatives had heart problems.  She herself is a former smoker, having quit 8-9 years ago, does not have diabetes mellitus.  She has had hypertension for 9 years.  She has hypercholesterolemia for which she was just started on atorvastatin the last week.      Last week she had an episode of tightness that came on at rest.  This was accompanied by shortness of breath.  It was after dinner so it may have been related to food.  She noted no exacerbating or ameliorating features.  She had no diaphoresis, nausea or vomiting.  She was seen in the emergency room where a single troponin was negative.  Electrolytes were within normal limits.  Hemoglobin was 12.7.  She now referred for further evaluation and treatment.      Ms. Blandon states she has no exertional chest, arm, neck, jaw or shoulder discomfort.  She does not have any sort of regular exercise regimen.  She had no lightheadedness, dizziness, syncope or near-syncope.  No dyspnea on exertion, orthopnea or PND, no problems with ankle edema.        She states prior to that she may have had some diarrhea, but this has also cleared up.  She had no viral syndrome, no changes with deep inspiration or position.      ASSESSMENT AND PLAN:  Ms. Blandon has  pretty significant hypertension for a young woman.  I will set her up for a renal ultrasound to make sure we are not missing renal artery stenosis and make sure her kidneys looked fine.  As stated, creatinine was 0.97, giving her a GFR of 63.  Blood pressure is well controlled today with a blood pressure of 108/70 with a pulse of 72.      Chest pain syndrome sounds quite atypical, although she insists she was not under any stress or emotional upset.  I will set her up for a stress echocardiogram to evaluate for underlying coronary artery disease.  I will leave her on her beta blocker.  I would like to see what her blood pressure and heart rate response is.  I suspect blood pressure may be the key issue here.  We will also be able to look for any left ventricular hypertrophy or any end-organ damage from her high blood pressure.      If her stress test is indeed negative for ischemia, then I would set her up for a calcium score to make sure she does not have an underlying coronary artery disease.  If her calcium score puts her at high risk, then I would get very aggressive with her cholesterol management.  As stated, her fasting lipid profile from July shows total cholesterol 190 with HDL 44, LDL of 120 and triglycerides of 130.  She clearly has HDL deficiency.  She has been started on atorvastatin 10 mg.  She has not had any blood work done since then.      I emphasized the importance of a regular exercise regimen, a Mediterranean-style diet.  Further evaluation and treatment will depend upon the above results.         LETI GOINS MD, [facility]             D: 2018   T: 2018   MT: ANKUSH      Name:     MARIA ISABEL ALVA   MRN:      [id number]        Account:      [id number]   :      1975           Service Date: 2018      Document: [id number]

## 2018-03-06 NOTE — LETTER
3/6/2018    Korina Garcia MD  [address]  [place] [address]    RE: Juany Blandon       Dear Colleague,    I had the pleasure of seeing Juany Blandon in the [facility].    HPI and Plan:   See dictation    Orders Placed This Encounter   Procedures     US Renal Complete w Doppler Complete     Exercise Stress Echocardiogram       No orders of the defined types were placed in this encounter.      There are no discontinued medications.      Encounter Diagnoses   Name Primary?     HTN, goal below 140/90 Yes     Precordial pain      Essential hypertension with goal blood pressure less than 140/90      BMI 27.0-27.9,adult        CURRENT MEDICATIONS:  Current Outpatient Prescriptions   Medication Sig Dispense Refill     omeprazole (PRILOSEC) 20 MG CR capsule Take 1 capsule (20 mg) by mouth daily 90 capsule 3     atorvastatin (LIPITOR) 10 MG tablet Take 1 tablet (10 mg) by mouth daily 30 tablet 1     aspirin 81 MG tablet Take 81 mg by mouth daily       amLODIPine (NORVASC) 5 MG tablet Take 1 tablet (5 mg) by mouth daily 30 tablet 1     labetalol (NORMODYNE) 100 MG tablet TAKE 1 TABLET(100 MG) BY MOUTH TWICE DAILY 60 tablet 3     Prenatal Vit-Fe Fumarate-FA (PRENATAL MULTIVITAMIN PLUS IRON) 27-0.8 MG TABS per tablet Take 1 tablet by mouth daily 100 tablet 3       ALLERGIES     Allergies   Allergen Reactions     No Known Allergies        PAST MEDICAL HISTORY:  Past Medical History:   Diagnosis Date     HX FIBROADENOMA L BREAST      JOINT PAIN-MULT JTS, W/U NEG ~      SPONTANEOUS  03       PAST SURGICAL HISTORY:  Past Surgical History:   Procedure Laterality Date     C NONSPECIFIC PROCEDURE      L BREAST BX, bening 2014       SECTION             FAMILY HISTORY:  Family History   Problem Relation Age of Onset     Blood Disease Father      non higd lymphoma     Gynecology Mother      fibroids     Lipids Mother      Hypertension Mother   "    DIABETES Maternal Grandmother      Arthritis Paternal Grandmother      CEREBROVASCULAR DISEASE Maternal Grandfather       of stroke at age 50+     C.A.D. No family hx of      Breast Cancer No family hx of      Cancer - colorectal No family hx of        SOCIAL HISTORY:  Social History     Social History     Marital status:      Spouse name: CARISSA NOLEN     Number of children: 0     Years of education: N/A     Occupational History     RESEARCH SPECIALIST      WORKS FOR [employer]     Social History Main Topics     Smoking status: Former Smoker     Packs/day: 0.50     Years: 8.00     Types: Cigarettes     Start date:      Quit date: 2010     Smokeless tobacco: Never Used     Alcohol use No     Drug use: No     Sexual activity: Yes     Partners: Male     Other Topics Concern      Service No     Blood Transfusions No     Caffeine Concern No     Occupational Exposure No     Hobby Hazards No     Sleep Concern Yes     Stress Concern No     Weight Concern No     Special Diet No     Back Care Yes     upper back pain     Exercise No     Bike Helmet No     DOES NOT BIKE OUTSIDE     Seat Belt Yes     Self-Exams Yes     Social History Narrative    , one child, non smoker, no alcohol , working full time        Review of Systems:  Skin:  Negative       Eyes:  Positive for glasses    ENT:  Positive for nasal congestion    Respiratory:  Positive for dyspnea on exertion     Cardiovascular:    chest pain;palpitations;Positive for;dizziness    Gastroenterology: Negative      Genitourinary:  Negative      Musculoskeletal:  Negative      Neurologic:  Positive for numbness or tingling of hands;numbness or tingling of feet    Psychiatric:  Negative      Heme/Lymph/Imm:  Positive for allergies seasonal  Endocrine:  Positive for night sweats      Physical Exam:  Vitals: /70  Pulse 72  Ht 1.575 m (5' 2\")  Wt 69 kg (152 lb 1.6 oz)  LMP 2018 (Exact Date)  BMI 27.82 kg/m2    Constitutional:  " cooperative, alert and oriented, well developed, well nourished, in no acute distress        Skin:  warm and dry to the touch, no apparent skin lesions or masses noted          Head:  normocephalic, no masses or lesions        Eyes:  pupils equal and round, conjunctivae and lids unremarkable, sclera white, no xanthalasma, EOMS intact, no nystagmus        Lymph:      ENT:  no pallor or cyanosis, dentition good        Neck:  carotid pulses are full and equal bilaterally;no carotid bruit        Respiratory:  normal breath sounds, clear to auscultation, normal A-P diameter, normal symmetry, normal respiratory excursion, no use of accessory muscles         Cardiac: regular rhythm;normal S1 and S2;no murmurs, gallops or rubs detected                pulses full and equal                                        GI:  abdomen soft, non-tender, BS normoactive, no mass, no HSM, no bruits        Extremities and Muscular Skeletal:  no edema;no spinal abnormalities noted;normal muscle strength and tone              Neurological:  no gross motor deficits        Psych:  affect appropriate, oriented to time, person and place        CC  Korina Garcia MD  [address] [place] [address] [place] [address]                Thank you for allowing me to participate in the care of your patient.      Sincerely,     Mack Puckett MD     [facility]    cc:   Korina Garcia MD  [address] [place] [address] [place] [address]

## 2018-03-13 ENCOUNTER — HOSPITAL ENCOUNTER (OUTPATIENT)
Dept: ULTRASOUND IMAGING | Facility: CLINIC | Age: 43
Discharge: HOME | End: 2018-03-13
Attending: INTERNAL MEDICINE | Admitting: INTERNAL MEDICINE
Payer: COMMERCIAL

## 2018-03-13 DIAGNOSIS — I10 HTN, GOAL BELOW 140/90: ICD-10-CM

## 2018-03-13 PROCEDURE — 93975 VASCULAR STUDY: CPT | Mod: 26 | Performed by: INTERNAL MEDICINE

## 2018-03-13 PROCEDURE — 76770 US EXAM ABDO BACK WALL COMP: CPT | Mod: 26 | Performed by: INTERNAL MEDICINE

## 2018-03-13 PROCEDURE — 93975 VASCULAR STUDY: CPT | Mod: TC

## 2018-03-13 NOTE — PROGRESS NOTES
SUBJECTIVE:   Juany Blandon is a 42 year old female who presents to clinic today for the following health issues:    Patient request : Discuss results from Neurology     Patient was seen by neurology on 3/5/18 for consultation regarding numbness on the left side of the face.  She had had an MRI of the brain in the emergency room prior to that which was normal.  Carotid ultrasound showed less than 50% stenosis bilaterally.  For working up acute onset numbness in the left V2 distribution and left periorbital area associated with nonspecific headache and lightheadedness, neurology ordered MRI, MRA and MRV brain and MRA neck.  They also ordered blood work including ESR, JENNIFER, anticardiolipin and antiphospholipid antibodies.    Blood work was positive for B2 glycoprotein I ( IGM) AB , which was 98.  Rest of her labs were negative.  Patient was asked to follow-up with primary care clinic for management.  Other imaging study ordered by the neurology office was negative.          Problem list and histories reviewed & adjusted, as indicated.  Additional history: as documented    Patient Active Problem List   Diagnosis     JOINT PAIN-MULT JTS, W/U NEG ~     CARDIOVASCULAR SCREENING; LDL GOAL LESS THAN 160     Former smoker     Lumbar radiculopathy     Gastroesophageal reflux disease, esophagitis presence not specified     Seasonal allergic rhinitis     Essential hypertension with goal blood pressure less than 140/90     BMI 27.0-27.9,adult     Atherosclerosis of both carotid arteries     Precordial pain     Past Surgical History:   Procedure Laterality Date     C NONSPECIFIC PROCEDURE      L BREAST BX, fela 2014       SECTION             Social History   Substance Use Topics     Smoking status: Former Smoker     Packs/day: 0.50     Years: 8.00     Types: Cigarettes     Start date:      Quit date: 2010     Smokeless tobacco: Never Used     Alcohol use No     Family History   Problem  "Relation Age of Onset     Blood Disease Father      non higd lymphoma     Gynecology Mother      fibroids     Lipids Mother      Hypertension Mother      DIABETES Maternal Grandmother      Arthritis Paternal Grandmother      CEREBROVASCULAR DISEASE Maternal Grandfather       of stroke at age 50+     C.A.D. No family hx of      Breast Cancer No family hx of      Cancer - colorectal No family hx of          Current Outpatient Prescriptions   Medication Sig Dispense Refill     omeprazole (PRILOSEC) 20 MG CR capsule Take 1 capsule (20 mg) by mouth daily 90 capsule 3     atorvastatin (LIPITOR) 10 MG tablet Take 1 tablet (10 mg) by mouth daily 30 tablet 1     aspirin 81 MG tablet Take 81 mg by mouth daily       amLODIPine (NORVASC) 5 MG tablet Take 1 tablet (5 mg) by mouth daily 30 tablet 1     labetalol (NORMODYNE) 100 MG tablet TAKE 1 TABLET(100 MG) BY MOUTH TWICE DAILY 60 tablet 3     Prenatal Vit-Fe Fumarate-FA (PRENATAL MULTIVITAMIN PLUS IRON) 27-0.8 MG TABS per tablet Take 1 tablet by mouth daily 100 tablet 3     Allergies   Allergen Reactions     No Known Allergies        Reviewed and updated as needed this visit by clinical staff       Reviewed and updated as needed this visit by Provider         ROS:  CONSTITUTIONAL: NEGATIVE for fever, chills, change in weight  ENT/MOUTH: NEGATIVE for ear, mouth and throat problems  RESP: NEGATIVE for significant cough or SOB  CV: NEGATIVE for chest pain, palpitations or peripheral edema    OBJECTIVE:                                                    /80 (BP Location: Left arm, Patient Position: Chair, Cuff Size: Adult Regular)  Pulse 78  Ht 5' 2\" (1.575 m)  Wt 150 lb 3.2 oz (68.1 kg)  LMP 2018 (Exact Date)  SpO2 98%  Breastfeeding? No  BMI 27.47 kg/m2  Body mass index is 27.47 kg/(m^2).   GENERAL: healthy, alert, well nourished, well hydrated, no distress  HENT: ear canals- normal; TMs- normal; Nose- normal; Mouth- no ulcers, no lesions  NECK: no " tenderness, no adenopathy, no asymmetry, no masses, no stiffness; thyroid- normal to palpation  RESP: lungs clear to auscultation - no rales, no rhonchi, no wheezes  CV: regular rates and rhythm, normal S1 S2, no S3 or S4 and no murmur, no click or rub -         ASSESSMENT/PLAN:                                                        ICD-10-CM    1. Antiphospholipid antibody positive R76.0 ONC/HEME ADULT REFERRAL     Results reviewed with the patient.  Positive antiphospholipid antibody puts her at higher risk of blood clotting.  Recommended to be seen by hematology to review these results and discuss future management.  Patient requested a referral to [facility].  Referral was placed.    Total time spent was 25 minutes, more than half the time was spent in counseling the patient about the disease pathogenesis, treatment plan and coordinating care.    Nasima Damon MD  [facility]

## 2018-03-14 ENCOUNTER — DOCUMENTATION ONLY (OUTPATIENT)
Dept: CARDIOLOGY | Facility: CLINIC | Age: 43
End: 2018-03-14

## 2018-03-14 ENCOUNTER — HOSPITAL ENCOUNTER (OUTPATIENT)
Dept: CARDIOLOGY | Facility: CLINIC | Age: 43
Discharge: HOME | End: 2018-03-14
Attending: INTERNAL MEDICINE | Admitting: INTERNAL MEDICINE
Payer: COMMERCIAL

## 2018-03-14 DIAGNOSIS — R07.2 PRECORDIAL PAIN: ICD-10-CM

## 2018-03-14 DIAGNOSIS — R07.9 CHEST PAIN: Primary | ICD-10-CM

## 2018-03-14 PROCEDURE — 93325 DOPPLER ECHO COLOR FLOW MAPG: CPT | Mod: 26 | Performed by: INTERNAL MEDICINE

## 2018-03-14 PROCEDURE — 93321 DOPPLER ECHO F-UP/LMTD STD: CPT | Mod: 26 | Performed by: INTERNAL MEDICINE

## 2018-03-14 PROCEDURE — 93018 CV STRESS TEST I&R ONLY: CPT | Performed by: INTERNAL MEDICINE

## 2018-03-14 PROCEDURE — 93016 CV STRESS TEST SUPVJ ONLY: CPT | Performed by: INTERNAL MEDICINE

## 2018-03-14 PROCEDURE — 93321 DOPPLER ECHO F-UP/LMTD STD: CPT | Mod: TC

## 2018-03-14 PROCEDURE — 93350 STRESS TTE ONLY: CPT | Mod: 26 | Performed by: INTERNAL MEDICINE

## 2018-03-14 PROCEDURE — 25500064 ZZH RX 255 OP 636: Performed by: INTERNAL MEDICINE

## 2018-03-14 RX ADMIN — HUMAN ALBUMIN MICROSPHERES AND PERFLUTREN 3 ML: 10; .22 INJECTION, SOLUTION INTRAVENOUS at 11:47

## 2018-03-14 NOTE — PROGRESS NOTES
"Stress echo and US renal results 3/14/18 noted, will message Dr. Puckett to review    Per Dr. Puckett's recommendation \"Okay let's proceed with calcium score\"    Order placed for CT calcium score  Contacted patient to review recommendation for self-pay $99 CT calcium score per Dr. Puckett. Patient verbalized understanding and agreed with plan. Connected patient with scheduling.      "

## 2018-03-15 ENCOUNTER — TRANSFERRED RECORDS (OUTPATIENT)
Dept: HEALTH INFORMATION MANAGEMENT | Facility: CLINIC | Age: 43
End: 2018-03-15

## 2018-03-16 ENCOUNTER — OFFICE VISIT (OUTPATIENT)
Dept: FAMILY MEDICINE | Facility: CLINIC | Age: 43
End: 2018-03-16
Payer: COMMERCIAL

## 2018-03-16 VITALS
SYSTOLIC BLOOD PRESSURE: 120 MMHG | WEIGHT: 150.2 LBS | BODY MASS INDEX: 27.64 KG/M2 | OXYGEN SATURATION: 98 % | DIASTOLIC BLOOD PRESSURE: 80 MMHG | HEART RATE: 78 BPM | HEIGHT: 62 IN

## 2018-03-16 DIAGNOSIS — R76.0 ANTIPHOSPHOLIPID ANTIBODY POSITIVE: Primary | ICD-10-CM

## 2018-03-16 PROCEDURE — 99214 OFFICE O/P EST MOD 30 MIN: CPT | Performed by: FAMILY MEDICINE

## 2018-03-16 NOTE — MR AVS SNAPSHOT
After Visit Summary   3/16/2018    Juany Blandon    MRN: [id number]           Patient Information     Date Of Birth          1975        Visit Information        Provider Department      3/16/2018 12:00 PM Nasima Damon MD [facility] [place] [place]         Follow-ups after your visit        Follow-up notes from your care team     Return in about 4 months (around 7/16/2018) for Annual Physical Exam.      Your next 10 appointments already scheduled     Mar 19, 2018  9:30 AM CDT   CT CALCIUM SCREENING with SCICT1   [facility] (Cardiovascular Imaging at [facility])    [address] [place] [address]  [address]  [facility] [id number] [phone]           It is best to avoid caffeine on the day of your test.  Be sure to tell your doctor:   If there s any chance you are pregnant.   If you have any special needs.  Please wear loose clothing, such as a sweat suit or jogging clothes. Avoid snaps, zippers and other metal. We may ask you to undress and put on a hospital gown.  If you have any questions, please call the Imaging Department where you will have your exam.            Mar 26, 2018 10:00 AM CDT   Office Visit with Korina Garcia MD   [facility] [place] ([facility] [place])    [address] [place] [id number] [phone]           Bring a current list of meds and any records pertaining to this visit. For Physicals, please bring immunization records and any forms needing to be filled out. Please arrive 10 minutes early to complete paperwork.              Future tests that were ordered for you today     Open Future Orders        Priority Expected Expires Ordered    Radiologist Consult For Cardiology Routine 3/16/2018 3/16/2019 3/16/2018            Who to contact     If you have questions or need follow up information about today's clinic visit or your schedule please contact [facility] [place] directly  "at [phone].  Normal or non-critical lab and imaging results will be communicated to you by MyChart, letter or phone within 4 business days after the clinic has received the results. If you do not hear from us within 7 days, please contact the clinic through [employer] or phone. If you have a critical or abnormal lab result, we will notify you by phone as soon as possible.  Submit refill requests through [employer] or call your pharmacy and they will forward the refill request to us. Please allow 3 business days for your refill to be completed.          Additional Information About Your Visit        [employer] Information     [employer] gives you secure access to your electronic health record. If you see a primary care provider, you can also send messages to your care team and make appointments. If you have questions, please call your primary care clinic.  If you do not have a primary care provider, please call [phone] and they will assist you.        Care EveryWhere ID     This is your Care EveryWhere ID. This could be used by other organizations to access your [place] medical records  [id number]        Your Vitals Were     Pulse Height Last Period Pulse Oximetry Breastfeeding? BMI (Body Mass Index)    78 5' 2\" (1.575 m) 02/26/2018 (Exact Date) 98% No 27.47 kg/m2       Blood Pressure from Last 3 Encounters:   03/16/18 120/80   03/06/18 108/70   03/01/18 120/74    Weight from Last 3 Encounters:   03/16/18 150 lb 3.2 oz (68.1 kg)   03/06/18 152 lb 1.6 oz (69 kg)   03/01/18 156 lb (70.8 kg)              Today, you had the following     No orders found for display       Primary Care Provider Office Phone # Fax #    Korina Garcia [phone]       [address]  [place] [address]        Equal Access to Services     [facility] ROZ : Ralph jay Sowaqas, waaxda luqadaha, qaybta kaalmada adelanreyaberna, nettie gee. So [facility] [phone].    ATENCIÓN: Si " taylor carver, tiene a jacques disposición servicios gratuitos de asistencia lingüística. Kaz underwood [phone].    We comply with applicable federal civil rights laws and [place] laws. We do not discriminate on the basis of race, color, national origin, age, disability, sex, sexual orientation, or gender identity.            Thank you!     Thank you for choosing [facility] [place]  for your care. Our goal is always to provide you with excellent care. Hearing back from our patients is one way we can continue to improve our services. Please take a few minutes to complete the written survey that you may receive in the mail after your visit with us. Thank you!             Your Updated Medication List - Protect others around you: Learn how to safely use, store and throw away your medicines at www.disposemymeds.org.          This list is accurate as of 3/16/18 12:41 PM.  Always use your most recent med list.                   Brand Name Dispense Instructions for use Diagnosis    amLODIPine 5 MG tablet    NORVASC    30 tablet    Take 1 tablet (5 mg) by mouth daily    Benign essential hypertension       aspirin 81 MG tablet      Take 81 mg by mouth daily        atorvastatin 10 MG tablet    LIPITOR    30 tablet    Take 1 tablet (10 mg) by mouth daily    Hyperlipidemia LDL goal <130       labetalol 100 MG tablet    NORMODYNE    60 tablet    TAKE 1 TABLET(100 MG) BY MOUTH TWICE DAILY    Essential hypertension, HTN, goal below 140/90       omeprazole 20 MG CR capsule    priLOSEC    90 capsule    Take 1 capsule (20 mg) by mouth daily    Gastroesophageal reflux disease, esophagitis presence not specified       prenatal multivitamin plus iron 27-0.8 MG Tabs per tablet     100 tablet    Take 1 tablet by mouth daily    Pregnancy test positive

## 2018-03-16 NOTE — NURSING NOTE
"Chief Complaint   Patient presents with     Patient Request       Initial /85 (BP Location: Left arm, Patient Position: Chair, Cuff Size: Adult Regular)  Pulse 78  Ht 5' 2\" (1.575 m)  Wt 150 lb 3.2 oz (68.1 kg)  LMP 02/26/2018 (Exact Date)  SpO2 98%  Breastfeeding? No  BMI 27.47 kg/m2 Estimated body mass index is 27.47 kg/(m^2) as calculated from the following:    Height as of this encounter: 5' 2\" (1.575 m).    Weight as of this encounter: 150 lb 3.2 oz (68.1 kg).  Medication Reconciliation: complete     Perla Anderson MA     "

## 2018-03-16 NOTE — PROGRESS NOTES
Will get [facility] records stat scanned  But also faxed them to [facility] [phone]  Jessica GROVE

## 2018-03-16 NOTE — Clinical Note
[facility] Patient is aware[facility]   Records are in outbox my room.

## 2018-03-19 ENCOUNTER — TELEPHONE (OUTPATIENT)
Dept: CARDIOLOGY | Facility: CLINIC | Age: 43
End: 2018-03-19

## 2018-03-19 ENCOUNTER — HOSPITAL ENCOUNTER (OUTPATIENT)
Dept: CARDIOLOGY | Facility: CLINIC | Age: 43
Discharge: HOME | End: 2018-03-19
Attending: INTERNAL MEDICINE | Admitting: INTERNAL MEDICINE
Payer: COMMERCIAL

## 2018-03-19 ENCOUNTER — TELEPHONE (OUTPATIENT)
Dept: FAMILY MEDICINE | Facility: CLINIC | Age: 43
End: 2018-03-19

## 2018-03-19 DIAGNOSIS — R07.9 CHEST PAIN: ICD-10-CM

## 2018-03-19 DIAGNOSIS — R76.0 ANTIPHOSPHOLIPID ANTIBODY POSITIVE: Primary | ICD-10-CM

## 2018-03-19 PROCEDURE — 75571 CT HRT W/O DYE W/CA TEST: CPT | Mod: 26 | Performed by: INTERNAL MEDICINE

## 2018-03-19 PROCEDURE — 75571 CT HRT W/O DYE W/CA TEST: CPT

## 2018-03-19 NOTE — TELEPHONE ENCOUNTER
Per Referrals Dept      Her insurance doesn't require referrals. She has [facility] she would need to check with them to make sure [facility] is in her network with . [facility] always request referral from PCP. But tell patient just because she is being referred to [facility] doesn't mean she will get an appt.  Each referral has to go through the review board for there dept they will look at her information and then decide. I would not cancel my appt with the [facility].    Routing to Dr Damon to place Referral for the [facility] per patient request  Will inform patient of message below once order has been placed/faxed  Jessica GROVE

## 2018-03-19 NOTE — TELEPHONE ENCOUNTER
Referral and [facility] records were faxed to [facility] as requested  message left for patient re: Referral Dept below  Jessica GROVE

## 2018-03-19 NOTE — TELEPHONE ENCOUNTER
Patient has an appointment at the [facility] for Hematology 3/27 but is now wanting to go to the [facility]  Routing to Referrals to advise if this is ok before sending to Dr Damon to place new Hematology order for the [facility]  Jessica GROVE  .

## 2018-03-19 NOTE — TELEPHONE ENCOUNTER
Reason for Call:  Other referral for hematology    Detailed comments: wants hematolgoy referral to [facility] after her visit with Dr. Damon last week. Fax to [phone]    Phone Number Patient can be reached at: Other phone number:  [phone]    Best Time: any    Can we leave a detailed message on this number? YES    Call taken on 3/19/2018 at 10:34 AM by Nidia Aguilar

## 2018-03-19 NOTE — TELEPHONE ENCOUNTER
Ca+ scan done 3-19-18  Left main coronary artery: 0  Left anterior descending coronary artery: 0  Circumflex coronary artery: 0   Right coronary artery: 0  TOTAL CALCIUM SCORE: 0  SOFT TISSUE RESULTS:    Dr. Puckett Review -             Ca SCore is 0. She just needs aggressive lifestyle intervention and Rx of BP. No cardiac follow up necessary

## 2018-03-20 ENCOUNTER — TELEPHONE (OUTPATIENT)
Dept: FAMILY MEDICINE | Facility: CLINIC | Age: 43
End: 2018-03-20

## 2018-03-20 NOTE — TELEPHONE ENCOUNTER
Patient call stating that the referral to [facility] hematology having some questions and needing more information.  Received a fax from [facility] asking to call [phone] for further information (forms with Dr. Damon for review)    Spoke with [facility] referral [phone] - who state that the diagnoses on the referral Antiphospholipid antibody positive [R76.0]  - Primary  was not match requirement to see hematology department .  States that R76.0 show elevated JEF not antiphospholipid antibody     Also will need additional information for referral department to review, does patient has symptoms, etc.      Message handled by Nurse Triage with [facility] - provider name: Dr. Damon.  Let me finish my OV note, patient was actually diagnosed by neurology due to numbness/tingling on face.    Once OV note complete, it can be fax to [facility] in along with referral to provide them with additional information.      TC to follow up.   Also refer back to 3/19/18 phone encounter for referral request.  Thank you  Please let patient know when completed.  Thank you     Elidia Osorio RN

## 2018-03-21 ENCOUNTER — TELEPHONE (OUTPATIENT)
Dept: FAMILY MEDICINE | Facility: CLINIC | Age: 43
End: 2018-03-21

## 2018-03-21 NOTE — TELEPHONE ENCOUNTER
RN spoke with the [facility] yesterday  They asked for more information to be faxed to them  Which was done today  Patient informed via voicemail message  Jessica GROVE

## 2018-03-21 NOTE — TELEPHONE ENCOUNTER
*will wait for PCP to finish office note  * [facility] notes and the referral were already faxed to [facility] on 3/19/18  See below    Referral and [facility] records were faxed to [facility] as requested  message left for patient re: Referral Dept below  Jessica GROVE

## 2018-03-21 NOTE — TELEPHONE ENCOUNTER
All office notes from Nasima Damon MD  And PCP, yuli, ER, Labs, Imaging  Sent to [facility] [phone]  [facility] MRN :[id number]  Jessica GROVE

## 2018-03-21 NOTE — TELEPHONE ENCOUNTER
1440 spoke with patient to review CT calcium score=0 as good results and stress results as showing now ischemia. Patient will see her PCP for HTN management. Reviewed Dr. Puckett's recommendation for careful diet and exercise management.

## 2018-03-21 NOTE — TELEPHONE ENCOUNTER
Attempted to contact patient with Dr. Puckett's review of CT calcium score results. Left message for patient to call back

## 2018-03-21 NOTE — TELEPHONE ENCOUNTER
Reason for Call:  Other call back    Detailed comments: Patient was getting a referral to [facility] by Dr Damon but [facility] needs a call to tell them what department the patient should go to.  Her [facility] # is [phone], if that helps.  [facility] # [phone]    Phone Number Patient can be reached at: [phone]    Best Time: anytime    Can we leave a detailed message on this number? YES    Call taken on 3/21/2018 at 1:26 PM by Elsie Tovar

## 2018-03-27 ENCOUNTER — OFFICE VISIT (OUTPATIENT)
Dept: HEMATOLOGY | Facility: CLINIC | Age: 43
End: 2018-03-27
Attending: FAMILY MEDICINE
Payer: COMMERCIAL

## 2018-03-27 VITALS
TEMPERATURE: 98 F | HEIGHT: 63 IN | HEART RATE: 74 BPM | RESPIRATION RATE: 12 BRPM | WEIGHT: 150.4 LBS | BODY MASS INDEX: 26.65 KG/M2 | SYSTOLIC BLOOD PRESSURE: 143 MMHG | DIASTOLIC BLOOD PRESSURE: 87 MMHG | OXYGEN SATURATION: 98 %

## 2018-03-27 DIAGNOSIS — G45.9 TRANSIENT CEREBRAL ISCHEMIA, UNSPECIFIED TYPE: ICD-10-CM

## 2018-03-27 DIAGNOSIS — Z86.2 HX OF LUPUS ANTICOAGULANT DISORDER: Primary | ICD-10-CM

## 2018-03-27 LAB — INR PPP: 1.1 (ref 0.86–1.14)

## 2018-03-27 PROCEDURE — 84165 PROTEIN E-PHORESIS SERUM: CPT | Performed by: INTERNAL MEDICINE

## 2018-03-27 PROCEDURE — 00000401 ZZHCL STATISTIC THROMBIN TIME NC: Performed by: INTERNAL MEDICINE

## 2018-03-27 PROCEDURE — 00000402 ZZHCL STATISTIC TOTAL PROTEIN: Performed by: INTERNAL MEDICINE

## 2018-03-27 PROCEDURE — 84166 PROTEIN E-PHORESIS/URINE/CSF: CPT | Performed by: INTERNAL MEDICINE

## 2018-03-27 PROCEDURE — 86146 BETA-2 GLYCOPROTEIN ANTIBODY: CPT | Performed by: INTERNAL MEDICINE

## 2018-03-27 PROCEDURE — 86334 IMMUNOFIX E-PHORESIS SERUM: CPT | Performed by: INTERNAL MEDICINE

## 2018-03-27 PROCEDURE — 86147 CARDIOLIPIN ANTIBODY EA IG: CPT | Performed by: INTERNAL MEDICINE

## 2018-03-27 PROCEDURE — 85730 THROMBOPLASTIN TIME PARTIAL: CPT | Performed by: INTERNAL MEDICINE

## 2018-03-27 PROCEDURE — 82784 ASSAY IGA/IGD/IGG/IGM EACH: CPT | Performed by: INTERNAL MEDICINE

## 2018-03-27 PROCEDURE — G0463 HOSPITAL OUTPT CLINIC VISIT: HCPCS

## 2018-03-27 PROCEDURE — 85613 RUSSELL VIPER VENOM DILUTED: CPT | Performed by: INTERNAL MEDICINE

## 2018-03-27 PROCEDURE — 99204 OFFICE O/P NEW MOD 45 MIN: CPT | Performed by: INTERNAL MEDICINE

## 2018-03-27 PROCEDURE — 85610 PROTHROMBIN TIME: CPT | Performed by: INTERNAL MEDICINE

## 2018-03-27 ASSESSMENT — PAIN SCALES - GENERAL: PAINLEVEL: NO PAIN (0)

## 2018-03-27 NOTE — NURSING NOTE
Juany Blandon  MRN: [id number]    Saw Juany Blandon with Dr. Sorensen today.  Juany has a recent history of hypertension, left facial numbness, then left arm/leg numbness.  She was started on aspirin and symptoms have gradually improved. However, she still has some facial pain in left forehead and cheek area.  All imaging was negative for clotting.  She was tested for Beta 2 glycoprotein IGM which was elevated (cardiolipin antibody antibodies normal, Beta 2 gp IGG negative, and lupus anticoagulant testing not done).  Complete antibody testing was done in January which was negative in outside system.  We will check antibody testing today to see if any testing abnormal.  We will defer facial pain back to PCP for possible sinus infection.  She does have a history of first trimester pregnancy loss (3 prior to delivery of child in 2011 and 1 after this delivery).  Father has history on NHL in 1986 in , so will do some additional lab tests. We are not advising additional anticoagulation at this time besides aspirin.  We will call her back in 7-10 days when results available.   Elsie Abdalla, RN, MSN -Nurse Clinician, [facility] [phone]

## 2018-03-27 NOTE — MR AVS SNAPSHOT
"              After Visit Summary   3/27/2018    Juany Blandon    MRN: [id number]           Patient Information     Date Of Birth          1975        Visit Information        Provider Department      3/27/2018 2:30 PM Maite Sorensen MD [facility]        Today's Diagnoses     Hx of lupus anticoagulant disorder    -  1      Care Instructions    1. Labs today in clinic.  Amy to Call with results in 7-10 days.  2.  Follow up with PCP in regards in \"sinus symptoms\".  3.  No need for clinic follow up (besides phone call_)          Follow-ups after your visit        Who to contact     If you have questions or need follow up information about today's clinic visit or your schedule please contact [facility] directly at [phone].  Normal or non-critical lab and imaging results will be communicated to you by MyChart, letter or phone within 4 business days after the clinic has received the results. If you do not hear from us within 7 days, please contact the clinic through MyChart or phone. If you have a critical or abnormal lab result, we will notify you by phone as soon as possible.  Submit refill requests through [employer] or call your pharmacy and they will forward the refill request to us. Please allow 3 business days for your refill to be completed.          Additional Information About Your Visit        MyChart Information     [employer] gives you secure access to your electronic health record. If you see a primary care provider, you can also send messages to your care team and make appointments. If you have questions, please call your primary care clinic.  If you do not have a primary care provider, please call [phone] and they will assist you.        Care EveryWhere ID     This is your Care EveryWhere ID. This could be used by other organizations to access your [place] medical records  [id number]        Your Vitals Were     Pulse " "Temperature Respirations Height Last Period Pulse Oximetry    74 98  F (36.7  C) (Oral) 12 1.6 m (5' 3\") 02/26/2018 (Exact Date) 98%    BMI (Body Mass Index)                   26.64 kg/m2            Blood Pressure from Last 3 Encounters:   03/27/18 143/87   03/16/18 120/80   03/06/18 108/70    Weight from Last 3 Encounters:   03/27/18 68.2 kg (150 lb 6.4 oz)   03/16/18 68.1 kg (150 lb 3.2 oz)   03/06/18 69 kg (152 lb 1.6 oz)              We Performed the Following     Beta 2 Glycoprotein 1 Antibody IgG     Beta 2 Glycoprotein 1 Antibody IgM     Cardiolipin Miya IgG and IgM     INR     Lupus Anticoagulant Panel        Primary Care Provider Office Phone # Fax #    Korina Brant Garcia [phone]       [address]  [place] [address]        Equal Access to Services     [facility]: Hadii aad ku hadasho Soomaali, waaxda luqadaha, qaybta kaalmada adeegyada, waxay brianin hayserenityn esmer cardenas . So [facility] [phone].    ATENCIÓN: Si taylor carver, tiene a jacques disposición servicios gratuitos de asistencia lingüística. Llame al [phone].    We comply with applicable federal civil rights laws and [place] laws. We do not discriminate on the basis of race, color, national origin, age, disability, sex, sexual orientation, or gender identity.            Thank you!     Thank you for choosing [facility]  for your care. Our goal is always to provide you with excellent care. Hearing back from our patients is one way we can continue to improve our services. Please take a few minutes to complete the written survey that you may receive in the mail after your visit with us. Thank you!             Your Updated Medication List - Protect others around you: Learn how to safely use, store and throw away your medicines at www.disposemymeds.org.          This list is accurate as of 3/27/18  3:53 PM.  Always use your most recent med list.                   Brand Name " Dispense Instructions for use Diagnosis    amLODIPine 5 MG tablet    NORVASC    30 tablet    Take 1 tablet (5 mg) by mouth daily    Benign essential hypertension       aspirin 81 MG tablet      Take 81 mg by mouth daily        atorvastatin 10 MG tablet    LIPITOR    30 tablet    Take 1 tablet (10 mg) by mouth daily    Hyperlipidemia LDL goal <130       labetalol 100 MG tablet    NORMODYNE    60 tablet    TAKE 1 TABLET(100 MG) BY MOUTH TWICE DAILY    Essential hypertension, HTN, goal below 140/90       prenatal multivitamin plus iron 27-0.8 MG Tabs per tablet     100 tablet    Take 1 tablet by mouth daily    Pregnancy test positive

## 2018-03-28 ENCOUNTER — TELEPHONE (OUTPATIENT)
Dept: FAMILY MEDICINE | Facility: CLINIC | Age: 43
End: 2018-03-28

## 2018-03-28 DIAGNOSIS — R09.81 SINUS CONGESTION: Primary | ICD-10-CM

## 2018-03-28 LAB
ALBUMIN MFR UR ELPH: 22.3 %
ALBUMIN SERPL ELPH-MCNC: 4.5 G/DL (ref 3.7–5.1)
ALPHA1 GLOB MFR UR ELPH: 2.5 %
ALPHA1 GLOB SERPL ELPH-MCNC: 0.3 G/DL (ref 0.2–0.4)
ALPHA2 GLOB MFR UR ELPH: 2.5 %
ALPHA2 GLOB SERPL ELPH-MCNC: 0.7 G/DL (ref 0.5–0.9)
B-GLOBULIN MFR UR ELPH: 63.1 %
B-GLOBULIN SERPL ELPH-MCNC: 1 G/DL (ref 0.6–1)
B2 GLYCOPROT1 IGG SERPL IA-ACNC: 2.7 U/ML
B2 GLYCOPROT1 IGM SERPL IA-ACNC: 3.1 U/ML
GAMMA GLOB MFR UR ELPH: 9.6 %
GAMMA GLOB SERPL ELPH-MCNC: 1.5 G/DL (ref 0.7–1.6)
IGA SERPL-MCNC: 214 MG/DL (ref 70–380)
IGG SERPL-MCNC: 1450 MG/DL (ref 695–1620)
IGM SERPL-MCNC: 125 MG/DL (ref 60–265)
LA PPP-IMP: NEGATIVE
M PROTEIN MFR UR ELPH: 50.7 %
M PROTEIN SERPL ELPH-MCNC: 0 G/DL
PROT PATTERN SERPL ELPH-IMP: NORMAL
PROT PATTERN SERPL IFE-IMP: NORMAL
PROT PATTERN UR ELPH-IMP: ABNORMAL

## 2018-03-28 NOTE — TELEPHONE ENCOUNTER
Patient needs a referral to ENT for sinus issues. Please advise.  [phone] (home)   Ok to leave detailed message: yes  Thank you  Merle Diggs

## 2018-03-28 NOTE — TELEPHONE ENCOUNTER
Can you please find out what kind of sinus problem she having ?  Needs no more to put an appropriate referral.  Does not look like she was seen at least recently for any sinus issues

## 2018-03-28 NOTE — TELEPHONE ENCOUNTER
See consent to leave detail message on this number from previous message.    Left detail message for patient with referral information as below and also Dr. Garcia recommendation.   Patient to call clinic with further questions    Your provider has referred you to: [facility]: [facility] [place] [phone]   [email or web address]  N: [facility] [place] [phone]   [email or web address]    Elidia Osorio RN

## 2018-03-28 NOTE — TELEPHONE ENCOUNTER
Patient states that the last month her left sinus has been stuffy. States that she has a headache and her left forehead is sore. States that when she blows her nose it is bloody. Denies fever.  Parul Mcclelland RN

## 2018-03-28 NOTE — TELEPHONE ENCOUNTER
Okay referral placed, although she could try OTC Flonase to see if that would help with the nasal congestion in the meantime

## 2018-03-29 LAB
CARDIOLIPIN ANTIBODY IGG: <1.6 GPL-U/ML (ref 0–19.9)
CARDIOLIPIN ANTIBODY IGM: 3.5 MPL-U/ML (ref 0–19.9)

## 2018-04-03 ENCOUNTER — TELEPHONE (OUTPATIENT)
Dept: HEMATOLOGY | Facility: CLINIC | Age: 43
End: 2018-04-03

## 2018-04-03 NOTE — TELEPHONE ENCOUNTER
Juany Blandon  MRN: [id number]    Left message with patient to inform her that all her lupus anticoagulant tests were negative and likely the test at [facility] was a false positive as she had negative test in January as well. I asked that she call back with any questions.  Results pasted below.  Elsie Abdalla, RN, MSN -Nurse Clinician, [facility] [phone]    Component      Latest Ref Rng & Units 3/27/2018   Cardiolipin Antibody IgG      0.0 - 19.9 GPL-U/mL <1.6   Cardiolipin Antibody IgM      0.0 - 19.9 MPL-U/mL 3.5   Lupus Result      NEG:Negative Negative   Beta 2 Glycoprotein 1 Antibody IgM      <7 U/mL 3.1   Beta 2 Glycoprotein 1 Antibody IgG      <7 U/mL 2.7   INR      0.86 - 1.14 1.10

## 2018-04-03 NOTE — PROGRESS NOTES
Please call the patient to inform her that all her lupus anticoagulant tests are negative and likely the test at [facility] was a false positive as she had negative test in January as well.   Maite Sorensen   of Medicine   Hematology and medical Oncology   [facility]

## 2018-04-11 ENCOUNTER — TELEPHONE (OUTPATIENT)
Dept: HEMATOLOGY | Facility: CLINIC | Age: 43
End: 2018-04-11

## 2018-04-11 DIAGNOSIS — D47.2 MONOCLONAL PARAPROTEINEMIA: Primary | ICD-10-CM

## 2018-04-11 NOTE — TELEPHONE ENCOUNTER
Juany Blandon was seen recently in the [facility].  She reports that she got a call from Dr. Sorensen, saying that she needs some tests repeated.  I did call Nessa and told her that the are pending tests in Epic.  She says that she will call the [facility] clinic and arrange to have these tests done.  She will call with future concerns.    Misty Garcia RN - Nurse Clinician - [facility] - [phone]

## 2018-04-11 NOTE — PROGRESS NOTES
[facility]  [address] [place] [address]  Phone: [phone], Fax: [phone]     Outpatient Visit Note:     Patient: Juany Blandon  MRN: [id number]  : 1975  RAAD: Mar 27, 2018     Reason for Consultation:  Juany Blandon was referred by Dr. Damon for evaluation of elevated beta-2 glycoprotein 1 IgM elevated value.     History of Present Illness:  Juany Blandon is a 42 year old female with history of hypertension who presents for evaluation after an elevated beta-2 glycoprotein 1 value (98) was found during workup for one month of sinusitis-like pain, left facial numbness and left hand/leg weakness.      At the end of December/early January, Juany reports having some nose bleeding and associated congestion which have persisted.  She reports that she clears her nose daily and still endorses some pressure or feeling like something is clotted around her left eye and nose.  She has experienced sinus pressure/possible infections in the past and indicates this is similar in quality.  She occasionally has headaches but they tend to resolve in 6-12 hours with ibuprofen, without any preceding aura or light sensitivity.       On 18 She reports an episode of elevated blood pressure to 185/118 and diarrhea for which she was seen in the ED.  She reports no abnormalities on her EKG and her non bloody diarrhea resolved after two days.  The day after her ED visit, she reports developing numbness in the left corner of her mouth that extended up to the left corner of her eyelid.  Her blood pressure was again elevated and she was seen in the ED, where an head MRI was unremarkable.  The following day, she reports her left leg and hand became numb and weak.  She developed a mild headache that surrounded her left eye.  She reports again being seen in the ER without abnormal findings.  She followed up with her primary care provider and after describing some  palpitations, was prescribed aspirin.  She was also started on amlodipine and had a carotid ultrasound that was negative for clotting/stenosis.  She was referred to a cardiologist and her stress cardiogram was negative.  She was also referred to [facility] for a neurologic evaluation and an MRI again was without acute pathology.  She received recommendation to stay on aspirin.  During this visit she was found to have an elevated beta-2 glycoprotein and was then referred by her primary provider to hematology.     She reports some possible slurring of words for 3-4 days over the last month and has some difficulty getting words out, which she reports is improving.  Her headache is slowly improving and her leg and balance are also slowly improving.  She reports being more fatigued during the afternoon.       Today she denies vision changes, mouth sores, neck stiffness, shortness of breath, chest pain, nausea, vomiting, diarrhea, blood in stool, blood in urine, joint pain, or easy bruising.           Past Medical History:   Past Medical History    Past Medical History:   Diagnosis Date     HX FIBROADENOMA L BREAST       JOINT PAIN-MULT JTS, W/U NEG ~       SPONTANEOUS  03      - 3 spontaneous first trimester abortions prior to   - 1 birth at 37 weeks via  in , no pre-eclampsia   - 1 spontaneous first trimester  in 10/2017  - Hypertension  - Gingivitis with occasional gum bleeding, no spontaneous bleeding           Past Surgical History:   Past Surgical History          Past Surgical History:   Procedure Laterality Date     C NONSPECIFIC PROCEDURE         L BREAST BX, bening 2014       SECTION                     Medications:   Current Outpatient Prescriptions           Current Outpatient Prescriptions   Medication Sig Dispense Refill     atorvastatin (LIPITOR) 10 MG tablet Take 1 tablet (10 mg) by mouth daily 30 tablet 1     aspirin 81 MG tablet Take 81 mg by  mouth daily         amLODIPine (NORVASC) 5 MG tablet Take 1 tablet (5 mg) by mouth daily 30 tablet 1     labetalol (NORMODYNE) 100 MG tablet TAKE 1 TABLET(100 MG) BY MOUTH TWICE DAILY 60 tablet 3     Prenatal Vit-Fe Fumarate-FA (PRENATAL MULTIVITAMIN PLUS IRON) 27-0.8 MG TABS per tablet Take 1 tablet by mouth daily 100 tablet 3            Allergies:       Allergies   Allergen Reactions     No Known Allergies           ROS:  10 point review of system otherwise negative.     Social History:  Smoked 10 cigarettes a day for a couple years, non smoker for past 8-9 years.  No alcohol intake.  No illicit drug use.      and works as a .       Occasional exercise by walking, normal diet.     Family History:  Father  of non hodgkin's lymphoma at age 48 in .       No known clotting or bleeding disorders.     Objectives:  Pleasant 42 year old female in no acute distress.  Vitals: B/P: 143/87, T: 98, P: 74, R: 12, Wt: 150 lbs 6.4 oz  Exam:   Gen: Appears well, no distress  HEENT: Mild tenderness to palpation at maxillary sinus, Normocephalic, atraumatic, no scleral icterus or hemorrhage, no lymphadenopathy, no nystagmus, EOMI   CV: regular, no murmurs  Pulm: clear  Abd: soft, nontender, no splenomegaly  Ext: no edema, full range of motion in all extremities        Labs:     18:  Cardiolipin IgA <1.4  Cardiolipin IgG <2.6  Cardiolipin IgM 2.0  Beta 2 GP1 Ab IgA <4.0  Beta 2 GP1 Ab IgG <6.4  Beta 2 GP1 Ab IgM 1.7     3/5/18 B-2 Cardiolipin 98     Imagin/25/18 MRI Brain without and with contrast:  IMPRESSION:  Normal MRI of the brain.     18 Bilateral Carotid US:  IMPRESSION: Less than 50% diameter stenosis of both internal carotid arteries relative to the distal ICA diameters     3/1/18 US Hysterosonogram:  1. Uterus is not normal in appearance.  The myometrium is diffusely   heterogeneous, which may be consistent with adenomyosis.  There are no   discrete masses or myomas.  2.  "Right ovary is normal in appearance.  3. Left ovary is normal in appearance.  4. Hysterosonogram appears normal, but the evaluation of the endometrium   is somewhat limited by current menses and blood in the endometrium.     3/13/18 Renal US:  Impression:  No evidence of renal artery stenosis or fibromuscular dysplasia.      3/15/18 MRA head  Normal MR angiogram of intracranial arteries     3/19/18 Coronary Calcium Scan  Total Calcium score O     3/19/18 Axial and Coronal CT heart  No abnormally enlarged mediastinal lymph nodes. The visible solid organs in the upper abdomen are unremarkable. No acute osseous abnormality. No pulmonary masses.        Assessment:  In summary, Juany Blandon is a 42 year old female with history of 4 first trimester spontaneous abortions, normal antiphospholipid panel in January, with recent isolated elevated B-2 GP1 lab in the context of one month of sinusitis-like symptoms, with low likelihood of underlying APS.     Her first trimester spontaneous abortions are consistent with APS, however, she has no documented arterial or venous thrombosis and all imaging from during these current symptoms have been negative.  She had APS labs done in January that were negative and a single recent elevated beta 2 glycoprotein 1.  These levels can fluctuate over time and we recommend complete APS panel to document baseline and determine whether further lab studies should be considered.  There is however, low concern for APS at the moment.  She has a family history of non hodgkin lymphoma and given her concern today and persistent symptoms, it would be reasonable to do an initial evaluation with labs.  She reports that her facial symptoms are similar to prior \"sinusitis\" episodes in the past and we would recommend following up with primary care provider for further evaluation.  She has had multiple MRI of the head and no pathology has been found, but a possible ENT could be entertained.     Her " current aspirin regimen is appropriate at this time, and further need for anticoagulation will be discussed with her pending her laboratory results.          Plan:  1. Majority of today's visit was spent counseling the patient regarding her elevated B-2 glycoprotein 1 value and possible implications.  2. Lupus Anticoagulation, Cardiolipin, Beta-2 Glycoprotein 1, INR, Protein electrophoresis serum/urine, protein immunofixation, Kappa/Lambda light chain ratio labs  3.  Follow up with primary care provider to evaluate for sinus infection/possible ENT consult     Elsie nurse clinician is present throughout the entire clinic visit with the patient today.  Patient understands and agrees with the above plan and recommendation.    I spent 55 minutes in the care of this patient >50% of which was spent in coordinating and counseling.      Maite Sorensen   of Medicine   Hematology and medical Oncology   [facility]    Lab review   Results for MARIA ISABEL ALVA (MRN [id number]) as of 4/11/2018 09:50   Ref. Range 3/27/2018 16:12   INR Latest Ref Range: 0.86 - 1.14  1.10   Lupus Result Latest Ref Range: NEG^Negative  Negative   Cardiolipin Antibody IgG Latest Ref Range: 0.0 - 19.9 GPL-U/mL <1.6   Cardiolipin Antibody IgM Latest Ref Range: 0.0 - 19.9 MPL-U/mL 3.5   Albumin Fraction Latest Ref Range: 3.7 - 5.1 g/dL 4.5   Alpha 1 Fraction Latest Ref Range: 0.2 - 0.4 g/dL 0.3   Alpha 2 Fraction Latest Ref Range: 0.5 - 0.9 g/dL 0.7   Beta Fraction Latest Ref Range: 0.6 - 1.0 g/dL 1.0   Beta 2 Glycoprotein 1 Antibody IgG Latest Ref Range: <7 U/mL 2.7   Beta 2 Glycoprotein 1 Antibody IgM Latest Ref Range: <7 U/mL 3.1   ELP Interpretation: Unknown Essentially irma...   Gamma Fraction Latest Ref Range: 0.7 - 1.6 g/dL 1.5   IGA Latest Ref Range: 70 - 380 mg/dL 214   IGG Latest Ref Range: 695 - 1620 mg/dL 1450   IGM Latest Ref Range: 60 - 265 mg/dL 125   Immunofixation ELP Unknown No monoclonal pro...    Monoclonal Peak Latest Ref Range: 0.0 g/dL 0.0       Results for MARIA ISABEL ALVA (MRN [id number]) as of 4/11/2018 09:50   Ref. Range 3/27/2018 16:15   Albumin Fraction Urine Latest Ref Range: 0 % 22.3 (H)   Alpha 1 Fraction Urine Latest Ref Range: 0 % 2.5 (H)   Alpha 2 Fraction Urine Latest Ref Range: 0 % 2.5 (H)   Beta Fraction Urine Latest Ref Range: 0 % 63.1 (H)   ELP Interpretation Urine Unknown Albumin and globu...   Gamma Fraction Urine Latest Ref Range: 0 % 9.6 (H)   Monoclonal Peak Urine Latest Ref Range: 0% % 50.7 (H)     Patient does not have lupus panel positive but she does have some abn on urine SPEP and this warrants IF on urine - she was called and I left a VM so that she could reach out to us and we can co-ordinate further testing     Maite Sorensen   of Medicine   Hematology and medical Oncology   [facility]  April 11, 2018

## 2018-04-12 DIAGNOSIS — O03.9 MISCARRIAGE: Primary | ICD-10-CM

## 2018-04-12 PROCEDURE — 36415 COLL VENOUS BLD VENIPUNCTURE: CPT | Performed by: INTERNAL MEDICINE

## 2018-04-12 PROCEDURE — 83883 ASSAY NEPHELOMETRY NOT SPEC: CPT | Mod: 59 | Performed by: INTERNAL MEDICINE

## 2018-04-12 PROCEDURE — 84156 ASSAY OF PROTEIN URINE: CPT | Mod: 90 | Performed by: INTERNAL MEDICINE

## 2018-04-12 PROCEDURE — 83883 ASSAY NEPHELOMETRY NOT SPEC: CPT | Mod: 90 | Performed by: INTERNAL MEDICINE

## 2018-04-12 PROCEDURE — 99000 SPECIMEN HANDLING OFFICE-LAB: CPT | Performed by: INTERNAL MEDICINE

## 2018-04-12 PROCEDURE — 86335 IMMUNFIX E-PHORSIS/URINE/CSF: CPT | Mod: 90 | Performed by: INTERNAL MEDICINE

## 2018-04-12 PROCEDURE — 86335 IMMUNFIX E-PHORSIS/URINE/CSF: CPT | Mod: 59 | Performed by: INTERNAL MEDICINE

## 2018-04-13 LAB
ALBUMIN UR QL: DETECTED
ALPHA1 GLOB 24H UR QL ELPH: DETECTED
ALPHA2 GLOB UR QL ELPH: DETECTED
B-GLOBULIN UR QL ELPH: DETECTED
COLLECT DURATION TIME SPEC: ABNORMAL H
GAMMA GLOB UR QL ELPH: DETECTED
INTERPRETATION UR IFE-IMP: ABNORMAL
KAPPA LC FREE 24H UR-MRATE: ABNORMAL MG/D
KAPPA LC FREE UR-MCNC: 0.16 MG/DL (ref 0.14–2.42)
KAPPA LC FREE/LAMBDA FREE UR: 16 RATIO (ref 2.04–10.37)
KAPPA LC UR-MCNC: 1.39 MG/DL (ref 0.33–1.94)
KAPPA LC/LAMBDA SER: 0.46 {RATIO} (ref 0.26–1.65)
LAMBDA LC FREE 24H UR-MRATE: ABNORMAL MG/D
LAMBDA LC FREE UR-MCNC: 0.01 MG/DL (ref 0.02–0.67)
LAMBDA LC SERPL-MCNC: 3.03 MG/DL (ref 0.57–2.63)
PROT 24H UR-MRATE: ABNORMAL MG/D (ref 10–140)
PROT ELPH PNL UR ELPH: NORMAL
SPECIMEN VOL ?TM UR: ABNORMAL ML

## 2018-04-18 NOTE — PROGRESS NOTES
Labs look Ok - no concern on my end - she needs follow up with primary. I have already called and left a Voice message.   Maite Sorensen   of Medicine   Hematology and medical Oncology   [facility]

## 2018-04-24 ENCOUNTER — OFFICE VISIT (OUTPATIENT)
Dept: FAMILY MEDICINE | Facility: CLINIC | Age: 43
End: 2018-04-24
Payer: COMMERCIAL

## 2018-04-24 VITALS
HEART RATE: 77 BPM | SYSTOLIC BLOOD PRESSURE: 142 MMHG | TEMPERATURE: 98.1 F | OXYGEN SATURATION: 98 % | HEIGHT: 63 IN | DIASTOLIC BLOOD PRESSURE: 98 MMHG | BODY MASS INDEX: 27.11 KG/M2 | WEIGHT: 153 LBS

## 2018-04-24 DIAGNOSIS — G44.89 OTHER HEADACHE SYNDROME: Primary | ICD-10-CM

## 2018-04-24 DIAGNOSIS — I10 ESSENTIAL HYPERTENSION WITH GOAL BLOOD PRESSURE LESS THAN 140/90: ICD-10-CM

## 2018-04-24 DIAGNOSIS — M26.609 TMJ (TEMPOROMANDIBULAR JOINT SYNDROME): ICD-10-CM

## 2018-04-24 DIAGNOSIS — J30.2 SEASONAL ALLERGIC RHINITIS, UNSPECIFIED CHRONICITY, UNSPECIFIED TRIGGER: ICD-10-CM

## 2018-04-24 PROCEDURE — 99214 OFFICE O/P EST MOD 30 MIN: CPT | Performed by: FAMILY MEDICINE

## 2018-04-24 PROCEDURE — 86003 ALLG SPEC IGE CRUDE XTRC EA: CPT | Performed by: FAMILY MEDICINE

## 2018-04-24 PROCEDURE — 36415 COLL VENOUS BLD VENIPUNCTURE: CPT | Performed by: FAMILY MEDICINE

## 2018-04-24 PROCEDURE — 80053 COMPREHEN METABOLIC PANEL: CPT | Performed by: FAMILY MEDICINE

## 2018-04-24 RX ORDER — AMLODIPINE BESYLATE 10 MG/1
10 TABLET ORAL DAILY
Qty: 90 TABLET | Refills: 0 | Status: SHIPPED | OUTPATIENT
Start: 2018-04-24 | End: 2018-07-28

## 2018-04-24 RX ORDER — FLUTICASONE PROPIONATE 50 MCG
1-2 SPRAY, SUSPENSION (ML) NASAL DAILY
Qty: 16 G | Status: SHIPPED | OUTPATIENT
Start: 2018-04-24 | End: 2019-10-14

## 2018-04-24 RX ORDER — FEXOFENADINE HCL 180 MG/1
180 TABLET ORAL DAILY
Qty: 30 TABLET | Refills: 0 | COMMUNITY
Start: 2018-04-24 | End: 2018-06-20

## 2018-04-24 ASSESSMENT — ANXIETY QUESTIONNAIRES
3. WORRYING TOO MUCH ABOUT DIFFERENT THINGS: SEVERAL DAYS
6. BECOMING EASILY ANNOYED OR IRRITABLE: NOT AT ALL
5. BEING SO RESTLESS THAT IT IS HARD TO SIT STILL: NOT AT ALL
7. FEELING AFRAID AS IF SOMETHING AWFUL MIGHT HAPPEN: NOT AT ALL
2. NOT BEING ABLE TO STOP OR CONTROL WORRYING: MORE THAN HALF THE DAYS
1. FEELING NERVOUS, ANXIOUS, OR ON EDGE: SEVERAL DAYS
GAD7 TOTAL SCORE: 6

## 2018-04-24 ASSESSMENT — PATIENT HEALTH QUESTIONNAIRE - PHQ9: 5. POOR APPETITE OR OVEREATING: MORE THAN HALF THE DAYS

## 2018-04-24 NOTE — MR AVS SNAPSHOT
After Visit Summary   4/24/2018    Juany Blandon    MRN: [id number]           Patient Information     Date Of Birth          1975        Visit Information        Provider Department      4/24/2018 4:20 PM Korina Garcia MD [facility]        Today's Diagnoses     Other headache syndrome    -  1    Essential hypertension with goal blood pressure less than 140/90        TMJ (temporomandibular joint syndrome)        Seasonal allergic rhinitis, unspecified chronicity, unspecified trigger          Care Instructions       * HEADACHE [unspecified]    The cause of your headache today is not clear, but it does not appear to be the sign of any serious illness.  Under stress, some people tense the muscles of their shoulder, neck and scalp without knowing it. If this condition lasts long enough, a TENSION HEADACHE can occur.  A MIGRAINE HEADACHE is caused by changes in blood flow to the brain. It can be mild or severe. A migraine attack may be triggered by emotional stress, hormone changes during the menstrual cycle, oral contraceptives, alcohol use, certain foods containing tyramine, eye strain, weather changes, missing meals, lack of sleep or oversleeping.  Other causes of headache include a viral illness, sinus, ear or throat infection, dental pain and TMJ (jaw joint) pain.  HOME CARE:      If you were given pain medicine for this headache, do not drive yourself home. Arrange for a ride, instead. When you get home, try to sleep. You should feel much better when you wake up.    If you are having nausea or vomiting, follow a light diet until your headache is relieved.    If you have a migraine type headache, use sunglasses when in the daylight or around bright indoor lighting until symptoms improve. Bright glaring light can worsen this kind of headache.  FOLLOW UP with your doctor if the headache is not better within the next 24 hours. If you have frequent headaches you should  discuss a treatment plan with your primary care doctor. By being aware of the earliest signs of headache, and starting treatment right away, you may be able to stop the pain yourself.  GET PROMPT MEDICAL ATTENTION if any of the following occur:    Worsening of your head pain or no improvement within 24 hours    Repeated vomiting (unable to keep liquids down)    Fever over 101 F (38.3 C)    Stiff neck    Extreme drowsiness, confusion or fainting    Weakness of an arm or leg or one side of the face    Difficulty with speech or vision    2099-1501 [employer]. [address] [place] [address]. All rights reserved. This information is not intended as a substitute for professional medical care. Always follow your healthcare professional's instructions.  This information has been modified by your health care provider with permission from the publisher.            Follow-ups after your visit        Follow-up notes from your care team     Return in about 4 weeks (around 5/22/2018).      Who to contact     If you have questions or need follow up information about today's clinic visit or your schedule please contact [facility] [place] directly at [phone].  Normal or non-critical lab and imaging results will be communicated to you by [employer], letter or phone within 4 business days after the clinic has received the results. If you do not hear from us within 7 days, please contact the clinic through Radhart or phone. If you have a critical or abnormal lab result, we will notify you by phone as soon as possible.  Submit refill requests through [employer] or call your pharmacy and they will forward the refill request to us. Please allow 3 business days for your refill to be completed.          Additional Information About Your Visit        [employer] Information     [employer] gives you secure access to your electronic health record. If you see a primary care provider, you can also send messages to  "your care team and make appointments. If you have questions, please call your primary care clinic.  If you do not have a primary care provider, please call [phone] and they will assist you.        Care EveryWhere ID     This is your Care EveryWhere ID. This could be used by other organizations to access your [facility] medical records  [id number]        Your Vitals Were     Pulse Temperature Height Last Period Pulse Oximetry BMI (Body Mass Index)    77 98.1  F (36.7  C) (Tympanic) 5' 3\" (1.6 m) 04/20/2018 98% 27.1 kg/m2       Blood Pressure from Last 3 Encounters:   04/24/18 (!) 142/98   04/23/18 129/83   03/27/18 143/87    Weight from Last 3 Encounters:   04/24/18 153 lb (69.4 kg)   04/23/18 153 lb (69.4 kg)   03/27/18 150 lb 6.4 oz (68.2 kg)              We Performed the Following     Allergy adult food panel     Comprehensive metabolic panel          Today's Medication Changes          These changes are accurate as of 4/24/18  4:57 PM.  If you have any questions, ask your nurse or doctor.               Start taking these medicines.        Dose/Directions    fexofenadine 180 MG tablet   Commonly known as:  ALLEGRA   Used for:  Seasonal allergic rhinitis, unspecified chronicity, unspecified trigger   Started by:  Korina Garcia MD        Dose:  180 mg   Take 1 tablet (180 mg) by mouth daily   Quantity:  30 tablet   Refills:  0       fluticasone 50 MCG/ACT spray   Commonly known as:  FLONASE   Used for:  Seasonal allergic rhinitis, unspecified chronicity, unspecified trigger   Started by:  Korina Garcia MD        Dose:  1-2 spray   Spray 1-2 sprays into both nostrils daily   Quantity:  16 g   Refills:  prn         These medicines have changed or have updated prescriptions.        Dose/Directions    * amLODIPine 5 MG tablet   Commonly known as:  NORVASC   This may have changed:  Another medication with the same name was added. Make sure you understand how and when to take each.   Used for:  Benign " essential hypertension   Changed by:  Korina Garcia MD        Dose:  5 mg   Take 1 tablet (5 mg) by mouth daily   Quantity:  30 tablet   Refills:  1       * amLODIPine 10 MG tablet   Commonly known as:  NORVASC   This may have changed:  You were already taking a medication with the same name, and this prescription was added. Make sure you understand how and when to take each.   Used for:  Essential hypertension with goal blood pressure less than 140/90   Changed by:  Korina Garcia MD        Dose:  10 mg   Take 1 tablet (10 mg) by mouth daily   Quantity:  90 tablet   Refills:  0       * Notice:  This list has 2 medication(s) that are the same as other medications prescribed for you. Read the directions carefully, and ask your doctor or other care provider to review them with you.         Where to get your medicines      These medications were sent to [id number] - [place], [place] - [address] AT [address]  [address] [place] [address] [address]     Phone:  [phone]     amLODIPine 10 MG tablet    fluticasone 50 MCG/ACT spray         Some of these will need a paper prescription and others can be bought over the counter.  Ask your nurse if you have questions.     You don't need a prescription for these medications     fexofenadine 180 MG tablet                Primary Care Provider Office Phone # Fax #    Korina Garcia [phone]       [address]  [place] [address]        Equal Access to Services     MELISSA COURTNEY AH: Hadii aad ku hadasho Soomaali, waaxda luqadaha, qaybta kaalmada adeegyada, nettie torresin hayserenityn esmer kaiser'petros gee. So [facility] [phone].    ATENCIÓN: Si habla español, tiene a jacques disposición servicios gratuitos de asistencia lingüística. Llame al [phone].    We comply with applicable federal civil rights laws and [place] laws. We do not discriminate on the basis of race, color,  national origin, age, disability, sex, sexual orientation, or gender identity.            Thank you!     Thank you for choosing [facility] [place]  for your care. Our goal is always to provide you with excellent care. Hearing back from our patients is one way we can continue to improve our services. Please take a few minutes to complete the written survey that you may receive in the mail after your visit with us. Thank you!             Your Updated Medication List - Protect others around you: Learn how to safely use, store and throw away your medicines at www.disposemymeds.org.          This list is accurate as of 4/24/18  4:57 PM.  Always use your most recent med list.                   Brand Name Dispense Instructions for use Diagnosis    * amLODIPine 5 MG tablet    NORVASC    30 tablet    Take 1 tablet (5 mg) by mouth daily    Benign essential hypertension       * amLODIPine 10 MG tablet    NORVASC    90 tablet    Take 1 tablet (10 mg) by mouth daily    Essential hypertension with goal blood pressure less than 140/90       aspirin 81 MG tablet      Take 81 mg by mouth daily        atorvastatin 10 MG tablet    LIPITOR    30 tablet    Take 1 tablet (10 mg) by mouth daily    Hyperlipidemia LDL goal <130       fexofenadine 180 MG tablet    ALLEGRA    30 tablet    Take 1 tablet (180 mg) by mouth daily    Seasonal allergic rhinitis, unspecified chronicity, unspecified trigger       fluticasone 50 MCG/ACT spray    FLONASE    16 g    Spray 1-2 sprays into both nostrils daily    Seasonal allergic rhinitis, unspecified chronicity, unspecified trigger       labetalol 100 MG tablet    NORMODYNE    60 tablet    TAKE 1 TABLET(100 MG) BY MOUTH TWICE DAILY    Essential hypertension, HTN, goal below 140/90       prenatal multivitamin plus iron 27-0.8 MG Tabs per tablet     100 tablet    Take 1 tablet by mouth daily    Pregnancy test positive       * Notice:  This list has 2 medication(s) that are the same as other  medications prescribed for you. Read the directions carefully, and ask your doctor or other care provider to review them with you.

## 2018-04-24 NOTE — PATIENT INSTRUCTIONS
* HEADACHE [unspecified]    The cause of your headache today is not clear, but it does not appear to be the sign of any serious illness.  Under stress, some people tense the muscles of their shoulder, neck and scalp without knowing it. If this condition lasts long enough, a TENSION HEADACHE can occur.  A MIGRAINE HEADACHE is caused by changes in blood flow to the brain. It can be mild or severe. A migraine attack may be triggered by emotional stress, hormone changes during the menstrual cycle, oral contraceptives, alcohol use, certain foods containing tyramine, eye strain, weather changes, missing meals, lack of sleep or oversleeping.  Other causes of headache include a viral illness, sinus, ear or throat infection, dental pain and TMJ (jaw joint) pain.  HOME CARE:      If you were given pain medicine for this headache, do not drive yourself home. Arrange for a ride, instead. When you get home, try to sleep. You should feel much better when you wake up.    If you are having nausea or vomiting, follow a light diet until your headache is relieved.    If you have a migraine type headache, use sunglasses when in the daylight or around bright indoor lighting until symptoms improve. Bright glaring light can worsen this kind of headache.  FOLLOW UP with your doctor if the headache is not better within the next 24 hours. If you have frequent headaches you should discuss a treatment plan with your primary care doctor. By being aware of the earliest signs of headache, and starting treatment right away, you may be able to stop the pain yourself.  GET PROMPT MEDICAL ATTENTION if any of the following occur:    Worsening of your head pain or no improvement within 24 hours    Repeated vomiting (unable to keep liquids down)    Fever over 101 F (38.3 C)    Stiff neck    Extreme drowsiness, confusion or fainting    Weakness of an arm or leg or one side of the face    Difficulty with speech or vision    [id number] [facility]  [employer]. [address] [place] [id number]. All rights reserved. This information is not intended as a substitute for professional medical care. Always follow your healthcare professional's instructions.  This information has been modified by your health care provider with permission from the publisher.

## 2018-04-24 NOTE — PROGRESS NOTES
SUBJECTIVE:   Juany Blandon is a 42 year old female who presents to clinic today for the following health issues:      Concern - Recheck           She had some issues with the elevated blood pressure and some headache and head pressure, she was evaluated the ER earlier and had a negative MRI scan.  She is a negative carotid ultrasound for any clotting or stenosis she also had seen neurology for this as well.  She has undergone a lot of testing per neurology , and during this she was found to have  some abnormal blood tests that showed elevated beta 2 glycoprotein and she was referred to the hematology and she has had further blood testing with a hematology as well.  She is here to do follow-up   She thinks her headaches are not bad now still this may be have mild pressure pressure.   Still has nasal stuffiness ENT gave her nose gel to help with the dryness although does not much.  Has been on no Flonase previously although she is not using it lately  She has ongoing stuffy nose and some sneezing,  wonder if taht could be causing the HA   Her hematology told that her work up is ok, not too concerning . Neurology work up was also okay  She is debating food allergy and wants blood test .   BP is still running somewhat higher,  although denies any chest pain palpitations shortness of breath.       Problem list and histories reviewed & adjusted, as indicated.  Additional history: as documented    Patient Active Problem List   Diagnosis     JOINT PAIN-DAYANNAT RODO, W/U NEG ~1998     CARDIOVASCULAR SCREENING; LDL GOAL LESS THAN 160     Former smoker     Lumbar radiculopathy     Gastroesophageal reflux disease, esophagitis presence not specified     Seasonal allergic rhinitis     Essential hypertension with goal blood pressure less than 140/90     BMI 27.0-27.9,adult     Atherosclerosis of both carotid arteries     Precordial pain     Past Surgical History:   Procedure Laterality Date     C NONSPECIFIC PROCEDURE      L  "BREAST BX, fela 2014       SECTION             Social History   Substance Use Topics     Smoking status: Former Smoker     Packs/day: 0.50     Years: 8.00     Types: Cigarettes     Start date:      Quit date: 2010     Smokeless tobacco: Never Used     Alcohol use No     Family History   Problem Relation Age of Onset     Blood Disease Father      non higd lymphoma     Gynecology Mother      fibroids     Lipids Mother      Hypertension Mother      DIABETES Maternal Grandmother      Arthritis Paternal Grandmother      CEREBROVASCULAR DISEASE Maternal Grandfather       of stroke at age 50+     C.A.D. No family hx of      Breast Cancer No family hx of      Cancer - colorectal No family hx of            Reviewed and updated as needed this visit by clinical staff       Reviewed and updated as needed this visit by Provider         ROS:  Constitutional, HEENT, cardiovascular, pulmonary, GI, , musculoskeletal, neuro, skin, endocrine and psych systems are negative, except as otherwise noted.    OBJECTIVE:     BP (!) 142/98  Pulse 77  Temp 98.1  F (36.7  C) (Tympanic)  Ht 5' 3\" (1.6 m)  Wt 153 lb (69.4 kg)  LMP 2018  SpO2 98%  BMI 27.1 kg/m2  Body mass index is 27.1 kg/(m^2).  GENERAL: healthy, alert and no distress  NECK: no adenopathy, no asymmetry, masses,  HE ENT, TMs clear.  Throat without any pharyngeal erythema nose with swollen turbinates and clear  rhinorrhea . No sinus tenderness.   RESP: lungs clear to auscultation - no rales, rhonchi or wheezes  CV: regular rate and rhythm, normal S1 S2, no S3 or S4, no murmur, click or rub, no peripheral edema and peripheral pulses strong  ABDOMEN: soft, nontender, no hepatosplenomegaly, no masses and bowel sounds normal  MS:  , no leg edema.  She has tender TMJ bilaterally slight neck stiffness, no acute tenderness in the neck  SKIN: no suspicious lesions or rashes  NEURO: Normal strength and tone, mentation intact and speech " normal  PSYCH: mentation appears normal, affect normal/bright        ASSESSMENT/PLAN:       (G44.89) Other headache syndrome  (primary encounter diagnosis)  Comment: Had negative neurological workup  Plan: Improved and stable    (M26.609) TMJ (temporomandibular joint syndrome)  Comment: has mild tenderness   Plan: TMJ could be contributing some of her headache and pressure symptomatic care as discussed talk about reducing stress adequate sleep etc. also encouraged her to check with the dentist as well she will follow.  If any ongoing problems or concerns.    (J30.2) Seasonal allergic rhinitis, unspecified chronicity, unspecified trigger  Comment:   Plan: Allergy adult food panel, fluticasone (FLONASE)        50 MCG/ACT spray, fexofenadine (ALLEGRA) 180 MG        tablet        She is worried about possible food allergies and wants to have the blood test done.  In the meantime she is willing to try some Flonase and Allegra to see if that would help.  Follow-up in 4 weeks sooner if any problem     (I10) Essential hypertension with goal blood pressure less than 140/90  Comment:   Plan: amLODIPine (NORVASC) 10 MG tablet,         Comprehensive metabolic panel        BP not in adequate control. Discussed cares, low fat low salt diet.  Decrease dose of Norvasc to 10 mg   encouraged home BP monitoring/nurse visit. Follow up recheck in 3-4 weeks, sooner if problem.           Patient expressed understanding and agreement with treatment plan. All patient's questions were answered, will let me know if has more later.  Medications: Rx's: Reviewed the potential side effects/complications of medications prescribed.       Korina Garcia MD  [facility] [place]

## 2018-04-24 NOTE — NURSING NOTE
"Chief Complaint   Patient presents with     RECHECK       Initial BP (!) 142/98  Pulse 77  Temp 98.1  F (36.7  C) (Tympanic)  Ht 5' 3\" (1.6 m)  Wt 153 lb (69.4 kg)  LMP 04/20/2018  SpO2 98%  BMI 27.1 kg/m2 Estimated body mass index is 27.1 kg/(m^2) as calculated from the following:    Height as of this encounter: 5' 3\" (1.6 m).    Weight as of this encounter: 153 lb (69.4 kg).  Medication Reconciliation: complete  "

## 2018-04-25 LAB
ALBUMIN SERPL-MCNC: 3.8 G/DL (ref 3.4–5)
ALP SERPL-CCNC: 56 U/L (ref 40–150)
ALT SERPL W P-5'-P-CCNC: 27 U/L (ref 0–50)
ANION GAP SERPL CALCULATED.3IONS-SCNC: 7 MMOL/L (ref 3–14)
AST SERPL W P-5'-P-CCNC: 17 U/L (ref 0–45)
BILIRUB SERPL-MCNC: 0.7 MG/DL (ref 0.2–1.3)
BUN SERPL-MCNC: 21 MG/DL (ref 7–30)
CALCIUM SERPL-MCNC: 9 MG/DL (ref 8.5–10.1)
CHLORIDE SERPL-SCNC: 105 MMOL/L (ref 94–109)
CO2 SERPL-SCNC: 28 MMOL/L (ref 20–32)
CREAT SERPL-MCNC: 0.76 MG/DL (ref 0.52–1.04)
GFR SERPL CREATININE-BSD FRML MDRD: 83 ML/MIN/1.7M2
GLUCOSE SERPL-MCNC: 87 MG/DL (ref 70–99)
POTASSIUM SERPL-SCNC: 4 MMOL/L (ref 3.4–5.3)
PROT SERPL-MCNC: 7.4 G/DL (ref 6.8–8.8)
SODIUM SERPL-SCNC: 140 MMOL/L (ref 133–144)

## 2018-04-25 ASSESSMENT — PATIENT HEALTH QUESTIONNAIRE - PHQ9: SUM OF ALL RESPONSES TO PHQ QUESTIONS 1-9: 2

## 2018-04-25 ASSESSMENT — ANXIETY QUESTIONNAIRES: GAD7 TOTAL SCORE: 6

## 2018-04-27 LAB
CLAM IGE QN: <0.1 KU(A)/L
CODFISH IGE QN: <0.1 KU(A)/L
CORN IGE QN: <0.1 KU(A)/L
COW MILK IGE QN: <0.1 KU/L
EGG WHITE IGE QN: <0.1 KU(A)/L
PEANUT IGE QN: <0.1 KU(A)/L
SCALLOP IGE QN: <0.1 KU(A)/L
SHRIMP IGE QN: 0.11 KU(A)/L
SOYBEAN IGE QN: <0.1 KU(A)/L
WALNUT IGE QN: <0.1 KU(A)/L
WHEAT IGE QN: <0.1 KU(A)/L

## 2018-06-20 ENCOUNTER — OFFICE VISIT (OUTPATIENT)
Dept: FAMILY MEDICINE | Facility: CLINIC | Age: 43
End: 2018-06-20
Payer: COMMERCIAL

## 2018-06-20 VITALS
HEIGHT: 63 IN | OXYGEN SATURATION: 98 % | DIASTOLIC BLOOD PRESSURE: 78 MMHG | TEMPERATURE: 98.3 F | SYSTOLIC BLOOD PRESSURE: 110 MMHG | HEART RATE: 68 BPM | BODY MASS INDEX: 27.29 KG/M2 | WEIGHT: 154 LBS

## 2018-06-20 DIAGNOSIS — Z13.9 SCREENING FOR CONDITION: ICD-10-CM

## 2018-06-20 DIAGNOSIS — Z23 NEED FOR VACCINATION: Primary | ICD-10-CM

## 2018-06-20 DIAGNOSIS — Z00.00 ROUTINE GENERAL MEDICAL EXAMINATION AT A HEALTH CARE FACILITY: ICD-10-CM

## 2018-06-20 DIAGNOSIS — Z13.6 CARDIOVASCULAR SCREENING; LDL GOAL LESS THAN 160: ICD-10-CM

## 2018-06-20 PROCEDURE — 99396 PREV VISIT EST AGE 40-64: CPT | Mod: 25 | Performed by: FAMILY MEDICINE

## 2018-06-20 PROCEDURE — 90715 TDAP VACCINE 7 YRS/> IM: CPT | Performed by: FAMILY MEDICINE

## 2018-06-20 PROCEDURE — 90471 IMMUNIZATION ADMIN: CPT | Performed by: FAMILY MEDICINE

## 2018-06-20 RX ORDER — FEXOFENADINE HCL 180 MG/1
180 TABLET ORAL DAILY
Qty: 30 TABLET | Refills: 0 | COMMUNITY
Start: 2018-06-20 | End: 2019-01-16

## 2018-06-20 NOTE — PROGRESS NOTES
SUBJECTIVE:   CC: Juany Blandon is an 42 year old woman who presents for preventive health visit.     Healthy Habits:    Do you get at least three servings of calcium containing foods daily (dairy, green leafy vegetables, etc.)? yes    Amount of exercise or daily activities, outside of work: 3 day(s) per week    Problems taking medications regularly No    Medication side effects: No    Have you had an eye exam in the past two years? yes    Do you see a dentist twice per year? yes    Do you have sleep apnea, excessive snoring or daytime drowsiness?no      PROBLEMS TO ADD ON...      Today's PHQ-2 Score:   PHQ-2 (  [employer]) 2018   Q1: Little interest or pleasure in doing things 0 0   Q2: Feeling down, depressed or hopeless 0 0   PHQ-2 Score 0 0       Abuse: Current or Past(Physical, Sexual or Emotional)- NA  Do you feel safe in your environment - Yes    Social History   Substance Use Topics     Smoking status: Former Smoker     Packs/day: 0.50     Years: 8.00     Types: Cigarettes     Start date:      Quit date: 2010     Smokeless tobacco: Never Used     Alcohol use No     If you drink alcohol do you typically have >3 drinks per day or >7 drinks per week? No                     Reviewed orders with patient.  Reviewed health maintenance and updated orders accordingly - Yes  Patient Active Problem List   Diagnosis     JOINT PAIN-TIARRA KOEHLER, W/U NEG ~     CARDIOVASCULAR SCREENING; LDL GOAL LESS THAN 160     Former smoker     Lumbar radiculopathy     Gastroesophageal reflux disease, esophagitis presence not specified     Seasonal allergic rhinitis     Essential hypertension with goal blood pressure less than 140/90     BMI 27.0-27.9,adult     Atherosclerosis of both carotid arteries     Precordial pain     Past Surgical History:   Procedure Laterality Date     C NONSPECIFIC PROCEDURE      L BREAST BX, bening 2014       SECTION             Social History   Substance Use  Topics     Smoking status: Former Smoker     Packs/day: 0.50     Years: 8.00     Types: Cigarettes     Start date:      Quit date: 2010     Smokeless tobacco: Never Used     Alcohol use No     Family History   Problem Relation Age of Onset     Blood Disease Father      non higd lymphoma     Gynecology Mother      fibroids     Lipids Mother      Hypertension Mother      Diabetes Maternal Grandmother      Arthritis Paternal Grandmother      Cerebrovascular Disease Maternal Grandfather       of stroke at age 50+     C.A.D. No family hx of      Breast Cancer No family hx of      Cancer - colorectal No family hx of            Patient under age 50, mutual decision reflected in health maintenance.      Pertinent mammograms are reviewed under the imaging tab.  History of abnormal Pap smear: NO - age 30- 65 PAP every 3 years recommended    Reviewed and updated as needed this visit by clinical staff  Tobacco  Allergies  Meds         Reviewed and updated as needed this visit by Provider        Past Medical History:   Diagnosis Date     HX FIBROADENOMA L BREAST      JOINT PAIN-MULT JDEANGELO, W/U NEG ~      SPONTANEOUS  03        ROS:  CONSTITUTIONAL: NEGATIVE for fever, chills, change in weight  INTEGUMENTARU/SKIN: NEGATIVE for worrisome rashes, moles or lesions  EYES: NEGATIVE for vision changes or irritation  ENT: NEGATIVE for ear, mouth and throat problems  RESP: NEGATIVE for significant cough or SOB  BREAST: NEGATIVE for masses, tenderness or discharge  CV: NEGATIVE for chest pain, palpitations or peripheral edema  GI: NEGATIVE for nausea, abdominal pain, heartburn, or change in bowel habits  : NEGATIVE for unusual urinary or vaginal symptoms. Periods are regular.  MUSCULOSKELETAL: NEGATIVE for significant arthralgias or myalgia  NEURO: NEGATIVE for weakness, dizziness or paresthesias  ENDOCRINE: NEGATIVE for temperature intolerance, skin/hair changes  HEME/ALLERGY/IMMUNE: NEGATIVE for bleeding  "problems  PSYCHIATRIC: NEGATIVE for changes in mood or affect    OBJECTIVE:   /78  Pulse 68  Temp 98.3  F (36.8  C) (Tympanic)  Ht 5' 3\" (1.6 m)  Wt 154 lb (69.9 kg)  LMP 06/13/2018  SpO2 98%  BMI 27.28 kg/m2  EXAM:  GENERAL APPEARANCE: healthy, alert and no distress  EYES: Eyes grossly normal to inspection, PERRL and conjunctivae and sclerae normal  HENT: ear canals and TM's normal, nose and mouth without ulcers or lesions, oropharynx clear and oral mucous membranes moist  NECK: no adenopathy, no asymmetry, masses, or scars and thyroid normal to palpation  RESP: lungs clear to auscultation - no rales, rhonchi or wheezes  BREAST: normal without masses, tenderness or nipple discharge and no palpable axillary masses or adenopathy  CV: regular rate and rhythm, normal S1 S2, no S3 or S4, no murmur, click or rub, no peripheral edema and peripheral pulses strong  ABDOMEN: soft, nontender, no hepatosplenomegaly, no masses and bowel sounds normal   (female): deferred  MS: no musculoskeletal defects are noted and gait is age appropriate without ataxia  SKIN: no suspicious lesions or rashes  NEURO: Normal strength and tone, sensory exam grossly normal, mentation intact and speech normal  PSYCH: mentation appears normal and affect normal/bright    ASSESSMENT/PLAN:            (Z00.00) Routine general medical examination at a health care facility  Comment:   Plan: Lipid panel reflex to direct LDL Fasting, **HIV        Antigen Antibody Combo FUTURE anytime, Glucose            (Z13.6) CARDIOVASCULAR SCREENING; LDL GOAL LESS THAN 160  Comment:   Plan: Lipid panel reflex to direct LDL Fasting            (Z13.9) Screening for condition  Comment:   Plan: **HIV Antigen Antibody Combo FUTURE anytime          (Z23) Need for vaccination  (primary encounter diagnosis)  Comment:   Plan: TDAP VACCINE (ADACEL) [08312.002], 1st          Administration  [41429]    COUNSELING:   Reviewed preventive health counseling, as reflected " "in patient instructions       Regular exercise       Healthy diet/nutrition       Vision screening       Immunizations    Vaccinated for: TDAP               reports that she quit smoking about 8 years ago. Her smoking use included Cigarettes. She started smoking about 16 years ago. She has a 4.00 pack-year smoking history. She has never used smokeless tobacco.    Estimated body mass index is 27.28 kg/(m^2) as calculated from the following:    Height as of this encounter: 5' 3\" (1.6 m).    Weight as of this encounter: 154 lb (69.9 kg).   Weight management plan: Discussed healthy diet and exercise guidelines and patient will follow up in 12 months in clinic to re-evaluate.    Counseling Resources:  ATP IV Guidelines  Pooled Cohorts Equation Calculator  Breast Cancer Risk Calculator  FRAX Risk Assessment  ICSI Preventive Guidelines  Dietary Guidelines for Americans, 2010  USDA's MyPlate  ASA Prophylaxis  Lung CA Screening    Korina Garcia MD  [facility]  "

## 2018-06-20 NOTE — MR AVS SNAPSHOT
After Visit Summary   6/20/2018    Juany Blandon    MRN: [id number]           Patient Information     Date Of Birth          1975        Visit Information        Provider Department      6/20/2018 9:00 AM Korina Garcia MD [facility] [place]        Today's Diagnoses     Need for vaccination    -  1    Routine general medical examination at a health care facility        CARDIOVASCULAR SCREENING; LDL GOAL LESS THAN 160        Screening for condition          Care Instructions      Preventive Health Recommendations  Female Ages 40 to 49    Yearly exam:     See your health care provider every year in order to  1. Review health changes.   2. Discuss preventive care.    3. Review your medicines if your doctor prescribed any.      Get a Pap test every three years (unless you have an abnormal result and your provider advises testing more often).      If you get Pap tests with HPV test, you only need to test every 5 years, unless you have an abnormal result. You do not need a Pap test if your uterus was removed (hysterectomy) and you have not had cancer.      You should be tested each year for STDs (sexually transmitted diseases), if you're at risk.       Ask your doctor if you should have a mammogram.      Have a colonoscopy (test for colon cancer) if someone in your family has had colon cancer or polyps before age 50.       Have a cholesterol test every 5 years.       Have a diabetes test (fasting glucose) after age 45. If you are at risk for diabetes, you should have this test every 3 years.    Shots: Get a flu shot each year. Get a tetanus shot every 10 years.     Nutrition:     Eat at least 5 servings of fruits and vegetables each day.    Eat whole-grain bread, whole-wheat pasta and brown rice instead of white grains and rice.    Talk to your provider about Calcium and Vitamin D.     Lifestyle    Exercise at least 150 minutes a week (an average of 30 minutes a day, 5 days a  week). This will help you control your weight and prevent disease.    Limit alcohol to one drink per day.    No smoking.     Wear sunscreen to prevent skin cancer.    See your dentist every six months for an exam and cleaning.          Follow-ups after your visit        Follow-up notes from your care team     Return in about 6 months (around 12/20/2018).      Future tests that were ordered for you today     Open Future Orders        Priority Expected Expires Ordered    Lipid panel reflex to direct LDL Fasting Routine  6/20/2019 6/20/2018    **HIV Antigen Antibody Combo FUTURE anytime Routine 6/20/2018 6/20/2019 6/20/2018    Glucose Routine  6/20/2019 6/20/2018            Who to contact     If you have questions or need follow up information about today's clinic visit or your schedule please contact [facility] [place] directly at [phone].  Normal or non-critical lab and imaging results will be communicated to you by MyChart, letter or phone within 4 business days after the clinic has received the results. If you do not hear from us within 7 days, please contact the clinic through [employer] or phone. If you have a critical or abnormal lab result, we will notify you by phone as soon as possible.  Submit refill requests through [employer] or call your pharmacy and they will forward the refill request to us. Please allow 3 business days for your refill to be completed.          Additional Information About Your Visit        MyChart Information     [employer] gives you secure access to your electronic health record. If you see a primary care provider, you can also send messages to your care team and make appointments. If you have questions, please call your primary care clinic.  If you do not have a primary care provider, please call [phone] and they will assist you.        Care EveryWhere ID     This is your Care EveryWhere ID. This could be used by other organizations to access your [facility]  "records  [id number]        Your Vitals Were     Pulse Temperature Height Last Period Pulse Oximetry BMI (Body Mass Index)    68 98.3  F (36.8  C) (Tympanic) 5' 3\" (1.6 m) 06/13/2018 98% 27.28 kg/m2       Blood Pressure from Last 3 Encounters:   06/20/18 110/78   04/24/18 (!) 142/98   04/23/18 129/83    Weight from Last 3 Encounters:   06/20/18 154 lb (69.9 kg)   04/24/18 153 lb (69.4 kg)   04/23/18 153 lb (69.4 kg)              We Performed the Following     1st  Administration  [70144]     TDAP VACCINE (ADACEL) [96766.002]          Where to get your medicines      Some of these will need a paper prescription and others can be bought over the counter.  Ask your nurse if you have questions.     You don't need a prescription for these medications     fexofenadine 180 MG tablet          Primary Care Provider Office Phone # Fax #    Korina Brant Garcia [phone]       [address]  [place] [address]        Equal Access to Services     [facility] AH: Hadii aad santos hadasho Somitchali, waaxda luqadaha, qaybta kaalmada adeegyada, nettie gee. So [facility] [phone].    ATENCIÓN: Si habla español, tiene a jacques disposición servicios gratuitos de asistencia lingüística. Llame al [phone].    We comply with applicable federal civil rights laws and [place] laws. We do not discriminate on the basis of race, color, national origin, age, disability, sex, sexual orientation, or gender identity.            Thank you!     Thank you for choosing [facility] [place]  for your care. Our goal is always to provide you with excellent care. Hearing back from our patients is one way we can continue to improve our services. Please take a few minutes to complete the written survey that you may receive in the mail after your visit with us. Thank you!             Your Updated Medication List - Protect others around you: Learn how to safely use, store and throw away your " medicines at www.disposemymeds.org.          This list is accurate as of 6/20/18  9:41 AM.  Always use your most recent med list.                   Brand Name Dispense Instructions for use Diagnosis    * amLODIPine 5 MG tablet    NORVASC    30 tablet    Take 1 tablet (5 mg) by mouth daily    Benign essential hypertension       * amLODIPine 10 MG tablet    NORVASC    90 tablet    Take 1 tablet (10 mg) by mouth daily    Essential hypertension with goal blood pressure less than 140/90       aspirin 81 MG tablet      Take 81 mg by mouth daily        fexofenadine 180 MG tablet    ALLEGRA    30 tablet    Take 1 tablet (180 mg) by mouth daily        fluticasone 50 MCG/ACT spray    FLONASE    16 g    Spray 1-2 sprays into both nostrils daily    Seasonal allergic rhinitis, unspecified chronicity, unspecified trigger       labetalol 100 MG tablet    NORMODYNE    60 tablet    TAKE 1 TABLET(100 MG) BY MOUTH TWICE DAILY    Essential hypertension, HTN, goal below 140/90       prenatal multivitamin plus iron 27-0.8 MG Tabs per tablet     100 tablet    Take 1 tablet by mouth daily    Pregnancy test positive       * Notice:  This list has 2 medication(s) that are the same as other medications prescribed for you. Read the directions carefully, and ask your doctor or other care provider to review them with you.

## 2018-06-25 DIAGNOSIS — I10 ESSENTIAL HYPERTENSION: ICD-10-CM

## 2018-06-25 DIAGNOSIS — I10 HTN, GOAL BELOW 140/90: ICD-10-CM

## 2018-06-25 RX ORDER — LABETALOL 100 MG/1
TABLET, FILM COATED ORAL
Qty: 60 TABLET | Refills: 5 | Status: SHIPPED | OUTPATIENT
Start: 2018-06-25 | End: 2018-12-10

## 2018-06-25 NOTE — TELEPHONE ENCOUNTER
Prescription approved per [facility] Refill Protocol.  Parul Mcclelland RN

## 2018-06-25 NOTE — TELEPHONE ENCOUNTER
"Requested Prescriptions   Pending Prescriptions Disp Refills     labetalol (NORMODYNE) 100 MG tablet [Pharmacy Med Name: LABETALOL 100MG TABLETS]  Last Written Prescription Date:  2/23/18  Last Fill Quantity: 60,  # refills: 3   Last office visit: 6/20/2018 with prescribing provider:  Jose   Future Office Visit:     60 tablet 0     Sig: TAKE 1 TABLET(100 MG) BY MOUTH TWICE DAILY    Beta-Blockers Protocol Passed    6/25/2018  3:39 AM       Passed - Blood pressure under 140/90 in past 12 months    BP Readings from Last 3 Encounters:   06/20/18 110/78   04/24/18 (!) 142/98   04/23/18 129/83                Passed - Patient is age 6 or older       Passed - Recent (12 mo) or future (30 days) visit within the authorizing provider's specialty    Patient had office visit in the last 12 months or has a visit in the next 30 days with authorizing provider or within the authorizing provider's specialty.  See \"Patient Info\" tab in inbasket, or \"Choose Columns\" in Meds & Orders section of the refill encounter.              "

## 2018-07-28 DIAGNOSIS — I10 ESSENTIAL HYPERTENSION WITH GOAL BLOOD PRESSURE LESS THAN 140/90: ICD-10-CM

## 2018-07-30 RX ORDER — AMLODIPINE BESYLATE 10 MG/1
TABLET ORAL
Qty: 90 TABLET | Refills: 1 | Status: SHIPPED | OUTPATIENT
Start: 2018-07-30 | End: 2019-01-08

## 2018-07-30 NOTE — TELEPHONE ENCOUNTER
"Requested Prescriptions   Pending Prescriptions Disp Refills     amLODIPine (NORVASC) 10 MG tablet [Pharmacy Med Name: AMLODIPINE BESYLATE 10MG TABLETS] 90 tablet 0     Sig: TAKE 1 TABLET(10 MG) BY MOUTH DAILY    Calcium Channel Blockers Protocol  Failed    7/28/2018  3:05 PM       Failed - No positive pregnancy test in past 12 months       Passed - Blood pressure under 140/90 in past 12 months    BP Readings from Last 3 Encounters:   06/20/18 110/78   04/24/18 (!) 142/98   04/23/18 129/83                Passed - Recent (12 mo) or future (30 days) visit within the authorizing provider's specialty    Patient had office visit in the last 12 months or has a visit in the next 30 days with authorizing provider or within the authorizing provider's specialty.  See \"Patient Info\" tab in inbasket, or \"Choose Columns\" in Meds & Orders section of the refill encounter.           Passed - Patient is age 18 or older       Passed - No active pregnancy on record       Passed - Normal serum creatinine on file in past 12 months    Recent Labs   Lab Test  04/24/18   1658   CR  0.76             Last Written Prescription Date:  4/24/2018  Last Fill Quantity: 90,  # refills: 0   Last office visit: 6/20/2018 with prescribing provider:  6/20/2018   Future Office Visit:      "

## 2018-07-30 NOTE — TELEPHONE ENCOUNTER
Prescription approved per [facility], [facility] or [facility] refill protocol.  Yoon Bueno RN - Triage  [facility]

## 2018-08-09 DIAGNOSIS — Z13.6 CARDIOVASCULAR SCREENING; LDL GOAL LESS THAN 160: ICD-10-CM

## 2018-08-09 DIAGNOSIS — Z13.9 SCREENING FOR CONDITION: ICD-10-CM

## 2018-08-09 DIAGNOSIS — Z00.00 ROUTINE GENERAL MEDICAL EXAMINATION AT A HEALTH CARE FACILITY: ICD-10-CM

## 2018-08-09 PROCEDURE — 80061 LIPID PANEL: CPT | Performed by: FAMILY MEDICINE

## 2018-08-09 PROCEDURE — 87389 HIV-1 AG W/HIV-1&-2 AB AG IA: CPT | Performed by: FAMILY MEDICINE

## 2018-08-09 PROCEDURE — 36415 COLL VENOUS BLD VENIPUNCTURE: CPT | Performed by: FAMILY MEDICINE

## 2018-08-09 PROCEDURE — 82947 ASSAY GLUCOSE BLOOD QUANT: CPT | Performed by: FAMILY MEDICINE

## 2018-08-10 LAB
CHOLEST SERPL-MCNC: 208 MG/DL
GLUCOSE SERPL-MCNC: 98 MG/DL (ref 70–99)
HDLC SERPL-MCNC: 50 MG/DL
HIV 1+2 AB+HIV1 P24 AG SERPL QL IA: NONREACTIVE
LDLC SERPL CALC-MCNC: 127 MG/DL
NONHDLC SERPL-MCNC: 158 MG/DL
TRIGL SERPL-MCNC: 154 MG/DL

## 2018-10-25 ENCOUNTER — OFFICE VISIT (OUTPATIENT)
Dept: FAMILY MEDICINE | Facility: CLINIC | Age: 43
End: 2018-10-25
Payer: COMMERCIAL

## 2018-10-25 VITALS
HEART RATE: 75 BPM | BODY MASS INDEX: 27.99 KG/M2 | DIASTOLIC BLOOD PRESSURE: 70 MMHG | OXYGEN SATURATION: 99 % | TEMPERATURE: 98.8 F | WEIGHT: 158 LBS | SYSTOLIC BLOOD PRESSURE: 104 MMHG

## 2018-10-25 DIAGNOSIS — Z23 NEED FOR PROPHYLACTIC VACCINATION AND INOCULATION AGAINST INFLUENZA: Primary | ICD-10-CM

## 2018-10-25 DIAGNOSIS — R42 DIZZINESS: ICD-10-CM

## 2018-10-25 LAB
ERYTHROCYTE [DISTWIDTH] IN BLOOD BY AUTOMATED COUNT: 12.4 % (ref 10–15)
HCT VFR BLD AUTO: 39.4 % (ref 35–47)
HGB BLD-MCNC: 13.5 G/DL (ref 11.7–15.7)
MCH RBC QN AUTO: 30.6 PG (ref 26.5–33)
MCHC RBC AUTO-ENTMCNC: 34.3 G/DL (ref 31.5–36.5)
MCV RBC AUTO: 89 FL (ref 78–100)
PLATELET # BLD AUTO: 246 10E9/L (ref 150–450)
RBC # BLD AUTO: 4.41 10E12/L (ref 3.8–5.2)
WBC # BLD AUTO: 9.6 10E9/L (ref 4–11)

## 2018-10-25 PROCEDURE — 99214 OFFICE O/P EST MOD 30 MIN: CPT | Mod: 25 | Performed by: FAMILY MEDICINE

## 2018-10-25 PROCEDURE — 90686 IIV4 VACC NO PRSV 0.5 ML IM: CPT | Performed by: FAMILY MEDICINE

## 2018-10-25 PROCEDURE — 85027 COMPLETE CBC AUTOMATED: CPT | Performed by: FAMILY MEDICINE

## 2018-10-25 PROCEDURE — 80053 COMPREHEN METABOLIC PANEL: CPT | Performed by: FAMILY MEDICINE

## 2018-10-25 PROCEDURE — 90471 IMMUNIZATION ADMIN: CPT | Performed by: FAMILY MEDICINE

## 2018-10-25 PROCEDURE — 36415 COLL VENOUS BLD VENIPUNCTURE: CPT | Performed by: FAMILY MEDICINE

## 2018-10-25 NOTE — PROGRESS NOTES
SUBJECTIVE:   Juany Blandon is a 42 year old female who presents to clinic today for the following health issues:      LIghtheadedness      Duration: 3-4 days - improving     Description   Feeling faint:  no     Intensity:  moderate    Accompanying signs and symptoms:   Nausea/vomitting: YES  Palpitations: no   Weakness in arms or legs: no   Vision or speech changes: no   Ringing in ears (Tinnitus): no   Hearing loss related to dizziness: no   Other (fevers/chills/sweating/dyspnea): no     Therapies tried and outcome: None    Patient complains of slight lightheadedness and dizziness for the last 3 4 days which has been improving.  She was traveling and she feels like she has sinus congestion.  Denies any fever chills.  She is also taking labetalol getting along with high dose of amlodipine 10 mg.  His blood pressure has been stable.  Denies any chest pain or shortness of breath      Problem list and histories reviewed & adjusted, as indicated.  Additional history: as documented    Patient Active Problem List   Diagnosis     JOINT PAIN-MULT JTS, W/U NEG ~     CARDIOVASCULAR SCREENING; LDL GOAL LESS THAN 160     Former smoker     Lumbar radiculopathy     Gastroesophageal reflux disease, esophagitis presence not specified     Seasonal allergic rhinitis     Essential hypertension with goal blood pressure less than 140/90     BMI 27.0-27.9,adult     Atherosclerosis of both carotid arteries     Precordial pain     Past Surgical History:   Procedure Laterality Date     C NONSPECIFIC PROCEDURE      L BREAST BX, fela 2014       SECTION             Social History   Substance Use Topics     Smoking status: Former Smoker     Packs/day: 0.50     Years: 8.00     Types: Cigarettes     Start date:      Quit date: 2010     Smokeless tobacco: Never Used     Alcohol use No     Family History   Problem Relation Age of Onset     Blood Disease Father      non higd lymphoma     Gynecology Mother       fibroids     Lipids Mother      Hypertension Mother      Diabetes Maternal Grandmother      Arthritis Paternal Grandmother      Cerebrovascular Disease Maternal Grandfather       of stroke at age 50+     C.A.D. No family hx of      Breast Cancer No family hx of      Cancer - colorectal No family hx of          Current Outpatient Prescriptions   Medication Sig Dispense Refill     amLODIPine (NORVASC) 10 MG tablet TAKE 1 TABLET(10 MG) BY MOUTH DAILY 90 tablet 1     aspirin 81 MG tablet Take 81 mg by mouth daily       fluticasone (FLONASE) 50 MCG/ACT spray Spray 1-2 sprays into both nostrils daily 16 g prn     labetalol (NORMODYNE) 100 MG tablet TAKE 1 TABLET(100 MG) BY MOUTH TWICE DAILY 60 tablet 5     fexofenadine (ALLEGRA) 180 MG tablet Take 1 tablet (180 mg) by mouth daily (Patient not taking: Reported on 10/25/2018) 30 tablet 0     Prenatal Vit-Fe Fumarate-FA (PRENATAL MULTIVITAMIN PLUS IRON) 27-0.8 MG TABS per tablet Take 1 tablet by mouth daily (Patient not taking: Reported on 10/25/2018) 100 tablet 3     [DISCONTINUED] amLODIPine (NORVASC) 5 MG tablet Take 1 tablet (5 mg) by mouth daily 30 tablet 1       Reviewed and updated as needed this visit by clinical staff  Tobacco  Allergies  Meds       Reviewed and updated as needed this visit by Provider         ROS:  CONSTITUTIONAL: NEGATIVE for fever, chills, change in weight  ENT/MOUTH: NEGATIVE for ear, mouth and throat problems  RESP: NEGATIVE for significant cough or SOB  CV: NEGATIVE for chest pain, palpitations or peripheral edema  NEURO: POSITIVE for dizziness/lightheadedness    OBJECTIVE:                                                    /70  Pulse 75  Temp 98.8  F (37.1  C) (Tympanic)  Wt 158 lb (71.7 kg)  LMP 10/05/2018 (Approximate)  SpO2 99%  BMI 27.99 kg/m2  Body mass index is 27.99 kg/(m^2).   GENERAL: healthy, alert, well nourished, well hydrated, no distress  NECK: no tenderness, no adenopathy, no asymmetry, no masses, no  stiffness; thyroid- normal to palpation  RESP: lungs clear to auscultation - no rales, no rhonchi, no wheezes  CV: regular rates and rhythm, normal S1 S2, no S3 or S4 and no murmur, no click or rub -  NEURO: strength and tone- normal, sensory exam- grossly normal, mentation- intact, speech- normal, reflexes- symmetric       ASSESSMENT/PLAN:                                                        ICD-10-CM    1. Need for prophylactic vaccination and inoculation against influenza Z23 FLU VACCINE, SPLIT VIRUS, IM (QUADRIVALENT) [42336]- >3 YRS     Vaccine Administration, Initial [92867]   2. Dizziness R42 CBC with platelets     Comprehensive metabolic panel     Patient clinical exam is normal.  I do not appreciate any spell or other signs or symptoms.  Patient symptoms are already improving it could be mild dehydration versus traveling and upper respiratory sinus issues.  However will get some blood work including CBC and complete metabolic panel to make sure they are normal.  Patient blood pressure is stable she is on 10 mg of amlodipine along with labetalol 100 mg twice daily.  I suggested she can decrease the dose of amlodipine to 5 mg to see if that helps overall.  If her blood pressure is high despite decreasing the dose I would suggest go back to her normal routine dose.  Follow-up if any new or changing symptoms.    At this time her symptoms are already improving and she does not have any residual symptoms.    Jacob Moy MD  [facility]    Injectable Influenza Immunization Documentation    1.  Is the person to be vaccinated sick today?   No    2. Does the person to be vaccinated have an allergy to a component   of the vaccine?   No  Egg Allergy Algorithm Link    3. Has the person to be vaccinated ever had a serious reaction   to influenza vaccine in the past?   No    4. Has the person to be vaccinated ever had Guillain-Barré syndrome?   No    Form completed by Ethan FISHMAN CMA

## 2018-10-25 NOTE — MR AVS SNAPSHOT
After Visit Summary   10/25/2018    Juany Blandon    MRN: [id number]           Patient Information     Date Of Birth          1975        Visit Information        Provider Department      10/25/2018 4:00 PM Jacob Moy MD [facility] [place]        Today's Diagnoses     Need for prophylactic vaccination and inoculation against influenza    -  1    Dizziness           Follow-ups after your visit        Follow-up notes from your care team     Return in about 1 week (around 11/1/2018) for if symptom does not get better.      Who to contact     If you have questions or need follow up information about today's clinic visit or your schedule please contact [facility] [place] directly at [phone].  Normal or non-critical lab and imaging results will be communicated to you by [employer], letter or phone within 4 business days after the clinic has received the results. If you do not hear from us within 7 days, please contact the clinic through [employer] or phone. If you have a critical or abnormal lab result, we will notify you by phone as soon as possible.  Submit refill requests through [employer] or call your pharmacy and they will forward the refill request to us. Please allow 3 business days for your refill to be completed.          Additional Information About Your Visit        MyChart Information     [employer] gives you secure access to your electronic health record. If you see a primary care provider, you can also send messages to your care team and make appointments. If you have questions, please call your primary care clinic.  If you do not have a primary care provider, please call [phone] and they will assist you.        Care EveryWhere ID     This is your Care EveryWhere ID. This could be used by other organizations to access your [place] medical records  [id number]        Your Vitals Were     Pulse Temperature Last Period Pulse Oximetry BMI (Body Mass Index)        75 98.8  F (37.1  C) (Tympanic) 10/05/2018 (Approximate) 99% 27.99 kg/m2        Blood Pressure from Last 3 Encounters:   10/25/18 104/70   06/20/18 110/78   04/24/18 (!) 142/98    Weight from Last 3 Encounters:   10/25/18 158 lb (71.7 kg)   06/20/18 154 lb (69.9 kg)   04/24/18 153 lb (69.4 kg)              We Performed the Following     CBC with platelets     Comprehensive metabolic panel     FLU VACCINE, SPLIT VIRUS, IM (QUADRIVALENT) [76904]- >3 YRS     Vaccine Administration, Initial [79203]          Today's Medication Changes          These changes are accurate as of 10/25/18  4:26 PM.  If you have any questions, ask your nurse or doctor.               These medicines have changed or have updated prescriptions.        Dose/Directions    amLODIPine 10 MG tablet   Commonly known as:  NORVASC   This may have changed:  Another medication with the same name was removed. Continue taking this medication, and follow the directions you see here.   Used for:  Essential hypertension with goal blood pressure less than 140/90   Changed by:  Jacob Moy MD        TAKE 1 TABLET(10 MG) BY MOUTH DAILY   Quantity:  90 tablet   Refills:  1                Primary Care Provider Office Phone # Fax #    Korina Brant Garcia [phone]        [address]  [place] [address]        Equal Access to Services     [facility] AH: Hadii aad santos hadasho Sowaqas, waaxda luqadaha, qaybta kaalmada adeegyada, nettie cardenas . So [facility] [phone].    ATENCIÓN: Si habla español, tiene a jacques disposición servicios gratuitos de asistencia lingüística. Llame al [phone].    We comply with applicable federal civil rights laws and [place] laws. We do not discriminate on the basis of race, color, national origin, age, disability, sex, sexual orientation, or gender identity.            Thank you!     Thank you for choosing [facility] [place]  for your care. Our goal is always to  provide you with excellent care. Hearing back from our patients is one way we can continue to improve our services. Please take a few minutes to complete the written survey that you may receive in the mail after your visit with us. Thank you!             Your Updated Medication List - Protect others around you: Learn how to safely use, store and throw away your medicines at www.disposemymeds.org.          This list is accurate as of 10/25/18  4:26 PM.  Always use your most recent med list.                   Brand Name Dispense Instructions for use Diagnosis    amLODIPine 10 MG tablet    NORVASC    90 tablet    TAKE 1 TABLET(10 MG) BY MOUTH DAILY    Essential hypertension with goal blood pressure less than 140/90       aspirin 81 MG tablet      Take 81 mg by mouth daily        fexofenadine 180 MG tablet    ALLEGRA    30 tablet    Take 1 tablet (180 mg) by mouth daily        fluticasone 50 MCG/ACT spray    FLONASE    16 g    Spray 1-2 sprays into both nostrils daily    Seasonal allergic rhinitis, unspecified chronicity, unspecified trigger       labetalol 100 MG tablet    NORMODYNE    60 tablet    TAKE 1 TABLET(100 MG) BY MOUTH TWICE DAILY    Essential hypertension, HTN, goal below 140/90       prenatal multivitamin plus iron 27-0.8 MG Tabs per tablet     100 tablet    Take 1 tablet by mouth daily    Pregnancy test positive

## 2018-10-26 LAB
ALBUMIN SERPL-MCNC: 3.8 G/DL (ref 3.4–5)
ALP SERPL-CCNC: 52 U/L (ref 40–150)
ALT SERPL W P-5'-P-CCNC: 22 U/L (ref 0–50)
ANION GAP SERPL CALCULATED.3IONS-SCNC: 6 MMOL/L (ref 3–14)
AST SERPL W P-5'-P-CCNC: 21 U/L (ref 0–45)
BILIRUB SERPL-MCNC: 0.6 MG/DL (ref 0.2–1.3)
BUN SERPL-MCNC: 24 MG/DL (ref 7–30)
CALCIUM SERPL-MCNC: 9 MG/DL (ref 8.5–10.1)
CHLORIDE SERPL-SCNC: 101 MMOL/L (ref 94–109)
CO2 SERPL-SCNC: 29 MMOL/L (ref 20–32)
CREAT SERPL-MCNC: 0.84 MG/DL (ref 0.52–1.04)
GFR SERPL CREATININE-BSD FRML MDRD: 74 ML/MIN/1.7M2
GLUCOSE SERPL-MCNC: 101 MG/DL (ref 70–99)
POTASSIUM SERPL-SCNC: 3.8 MMOL/L (ref 3.4–5.3)
PROT SERPL-MCNC: 7.8 G/DL (ref 6.8–8.8)
SODIUM SERPL-SCNC: 136 MMOL/L (ref 133–144)

## 2018-12-10 DIAGNOSIS — I10 HTN, GOAL BELOW 140/90: ICD-10-CM

## 2018-12-10 DIAGNOSIS — I10 ESSENTIAL HYPERTENSION: ICD-10-CM

## 2018-12-10 NOTE — TELEPHONE ENCOUNTER
"Requested Prescriptions   Pending Prescriptions Disp Refills     labetalol (NORMODYNE) 100 MG tablet [Pharmacy Med Name: LABETALOL 100MG TABLETS] 60 tablet 0     Sig: TAKE 1 TABLET(100 MG) BY MOUTH TWICE DAILY    Beta-Blockers Protocol Passed - 12/10/2018 11:41 AM       Passed - Blood pressure under 140/90 in past 12 months    BP Readings from Last 3 Encounters:   10/25/18 104/70   06/20/18 110/78   04/24/18 (!) 142/98       Last Written Prescription Date:  6/25/2018  Last Fill Quantity: ,60  # refills:  5  Last office visit: 10/25/2018 with prescribing provider:     Future Office Visit:             Passed - Patient is age 6 or older       Passed - Recent (12 mo) or future (30 days) visit within the authorizing provider's specialty    Patient had office visit in the last 12 months or has a visit in the next 30 days with authorizing provider or within the authorizing provider's specialty.  See \"Patient Info\" tab in inbasket, or \"Choose Columns\" in Meds & Orders section of the refill encounter.                "

## 2018-12-11 RX ORDER — LABETALOL 100 MG/1
TABLET, FILM COATED ORAL
Qty: 60 TABLET | Refills: 3 | Status: SHIPPED | OUTPATIENT
Start: 2018-12-11 | End: 2019-04-13

## 2019-01-08 DIAGNOSIS — I10 ESSENTIAL HYPERTENSION WITH GOAL BLOOD PRESSURE LESS THAN 140/90: ICD-10-CM

## 2019-01-08 RX ORDER — AMLODIPINE BESYLATE 10 MG/1
TABLET ORAL
Qty: 90 TABLET | Refills: 0 | Status: SHIPPED | OUTPATIENT
Start: 2019-01-08 | End: 2019-04-13

## 2019-01-08 NOTE — TELEPHONE ENCOUNTER
"Requested Prescriptions   Pending Prescriptions Disp Refills     amLODIPine (NORVASC) 10 MG tablet [Pharmacy Med Name: AMLODIPINE BESYLATE 10MG TABLETS]  Last Written Prescription Date:  7/30/18  Last Fill Quantity: 90,  # refills: 1   Last office visit: 10/25/2018 with prescribing provider:  jj   Future Office Visit:     90 tablet 0     Sig: TAKE 1 TABLET(10 MG) BY MOUTH DAILY    Calcium Channel Blockers Protocol  Passed - 1/8/2019  9:21 AM       Passed - Blood pressure under 140/90 in past 12 months    BP Readings from Last 3 Encounters:   10/25/18 104/70   06/20/18 110/78   04/24/18 (!) 142/98                Passed - Recent (12 mo) or future (30 days) visit within the authorizing provider's specialty    Patient had office visit in the last 12 months or has a visit in the next 30 days with authorizing provider or within the authorizing provider's specialty.  See \"Patient Info\" tab in inbasket, or \"Choose Columns\" in Meds & Orders section of the refill encounter.             Passed - Medication is active on med list       Passed - Patient is age 18 or older       Passed - No active pregnancy on record       Passed - Normal serum creatinine on file in past 12 months    Recent Labs   Lab Test 10/25/18  1622   CR 0.84            Passed - No positive pregnancy test in past 12 months          "

## 2019-01-08 NOTE — TELEPHONE ENCOUNTER
Prescription approved per [facility] Refill Protocol.    Charo RENE RN  EP Triage

## 2019-01-16 ENCOUNTER — OFFICE VISIT (OUTPATIENT)
Dept: FAMILY MEDICINE | Facility: CLINIC | Age: 44
End: 2019-01-16
Payer: COMMERCIAL

## 2019-01-16 VITALS
HEART RATE: 78 BPM | BODY MASS INDEX: 27.28 KG/M2 | OXYGEN SATURATION: 100 % | SYSTOLIC BLOOD PRESSURE: 112 MMHG | DIASTOLIC BLOOD PRESSURE: 70 MMHG | TEMPERATURE: 98.2 F | WEIGHT: 154 LBS

## 2019-01-16 DIAGNOSIS — R04.0 EPISTAXIS: Primary | ICD-10-CM

## 2019-01-16 PROCEDURE — 99213 OFFICE O/P EST LOW 20 MIN: CPT | Performed by: FAMILY MEDICINE

## 2019-01-16 NOTE — PROGRESS NOTES
SUBJECTIVE:   Juany Blandon is a 43 year old female who presents to clinic today for the following health issues:      Concern - nose bleeds  Onset: x one month    Description: Bleeding occurs only from the right side.  Daily nose bleeds x one month.    Intensity: mild    Progression of Symptoms:      Accompanying Signs & Symptoms:  Only last couple of minutes, is now having some lightheadedness.  Nose dose feel dry.  She usually uses tissue to block the nasal passage and lays down for a few minutes and then the bleeding stops.  Denies any recent URI symptoms.  She reports that this happened last year as well.          Problem list and histories reviewed & adjusted, as indicated.  Additional history: as documented    Patient Active Problem List   Diagnosis     JOINT PAIN-MULT JTS, W/U NEG ~     CARDIOVASCULAR SCREENING; LDL GOAL LESS THAN 160     Former smoker     Lumbar radiculopathy     Gastroesophageal reflux disease, esophagitis presence not specified     Seasonal allergic rhinitis     Essential hypertension with goal blood pressure less than 140/90     BMI 27.0-27.9,adult     Atherosclerosis of both carotid arteries     Precordial pain     Past Surgical History:   Procedure Laterality Date     C NONSPECIFIC PROCEDURE      L BREAST BX, ben[place] 2014       SECTION             Social History     Tobacco Use     Smoking status: Former Smoker     Packs/day: 0.50     Years: 8.00     Pack years: 4.00     Types: Cigarettes     Start date:      Last attempt to quit: 2010     Years since quittin.9     Smokeless tobacco: Never Used   Substance Use Topics     Alcohol use: No     Alcohol/week: 0.0 oz     Family History   Problem Relation Age of Onset     Blood Disease Father         non higd lymphoma     Gynecology Mother         fibroids     Lipids Mother      Hypertension Mother      Diabetes Maternal Grandmother      Arthritis Paternal Grandmother      Cerebrovascular Disease Maternal  Grandfather          of stroke at age 50+     C.A.D. No family hx of      Breast Cancer No family hx of      Cancer - colorectal No family hx of          Current Outpatient Medications   Medication Sig Dispense Refill     amLODIPine (NORVASC) 10 MG tablet TAKE 1 TABLET(10 MG) BY MOUTH DAILY 90 tablet 0     aspirin 81 MG tablet Take 81 mg by mouth daily       Calcium-Magnesium-Vitamin D (CALCIUM 500 PO)        Cholecalciferol (VITAMIN D PO)        fluticasone (FLONASE) 50 MCG/ACT spray Spray 1-2 sprays into both nostrils daily 16 g prn     labetalol (NORMODYNE) 100 MG tablet TAKE 1 TABLET(100 MG) BY MOUTH TWICE DAILY 60 tablet 3     VITAMIN E PO        Allergies   Allergen Reactions     No Known Allergies        Reviewed and updated as needed this visit by clinical staff       Reviewed and updated as needed this visit by Provider         ROS:  CONSTITUTIONAL: NEGATIVE for fever, chills, change in weight  INTEGUMENTARY/SKIN: NEGATIVE for worrisome rashes, moles or lesions  RESP: NEGATIVE for significant cough or SOB  CV: NEGATIVE for chest pain, palpitations or peripheral edema    OBJECTIVE:                                                    /70   Pulse 78   Temp 98.2  F (36.8  C) (Tympanic)   Wt 69.9 kg (154 lb)   SpO2 100%   BMI 27.28 kg/m    Body mass index is 27.28 kg/m .   GENERAL: healthy, alert, well nourished, well hydrated, no distress  HENT: ear canals- normal; TMs- normal; Mouth- no ulcers, no lesions, no sinus tenderness.  Nose: Right nasal passage, is noted to have significant amount of clotted blood along the anterior half of the medial nasal wall.  Hard to identify area of bleeding.  No active external bleeding.    NECK: no tenderness, no adenopathy, no asymmetry, no masses, no stiffness; thyroid- normal to palpation  RESP: lungs clear to auscultation - no rales, no rhonchi, no wheezes  CV: regular rates and rhythm, normal S1 S2, no S3 or S4 and no murmur, no click or rub -          ASSESSMENT/PLAN:                                                        ICD-10-CM    1. Epistaxis R04.0 OTOLARYNGOLOGY REFERRAL     Referring the patient to ENT.  Coordinator will assist the patient with making an appointment in the next 1-2 days.  May hold off on taking aspirin for a few days until she is seen by ENT.  Recommending to use humidifiers at home.  Avoid blowing her nose.      Nasima Damon MD  [facility]

## 2019-01-17 ENCOUNTER — TRANSFERRED RECORDS (OUTPATIENT)
Dept: HEALTH INFORMATION MANAGEMENT | Facility: CLINIC | Age: 44
End: 2019-01-17

## 2019-04-13 DIAGNOSIS — I10 ESSENTIAL HYPERTENSION WITH GOAL BLOOD PRESSURE LESS THAN 140/90: ICD-10-CM

## 2019-04-13 DIAGNOSIS — I10 HTN, GOAL BELOW 140/90: ICD-10-CM

## 2019-04-13 DIAGNOSIS — I10 ESSENTIAL HYPERTENSION: ICD-10-CM

## 2019-04-14 DIAGNOSIS — I10 HTN, GOAL BELOW 140/90: ICD-10-CM

## 2019-04-14 DIAGNOSIS — I10 ESSENTIAL HYPERTENSION WITH GOAL BLOOD PRESSURE LESS THAN 140/90: ICD-10-CM

## 2019-04-14 DIAGNOSIS — I10 ESSENTIAL HYPERTENSION: ICD-10-CM

## 2019-04-15 RX ORDER — LABETALOL 100 MG/1
TABLET, FILM COATED ORAL
Qty: 180 TABLET | Refills: 1 | Status: SHIPPED | OUTPATIENT
Start: 2019-04-15 | End: 2019-09-07

## 2019-04-15 RX ORDER — AMLODIPINE BESYLATE 10 MG/1
TABLET ORAL
Qty: 90 TABLET | Refills: 1 | Status: SHIPPED | OUTPATIENT
Start: 2019-04-15 | End: 2019-09-07

## 2019-04-15 RX ORDER — LABETALOL 100 MG/1
TABLET, FILM COATED ORAL
Qty: 60 TABLET | Refills: 2 | Status: SHIPPED | OUTPATIENT
Start: 2019-04-15 | End: 2019-09-23

## 2019-04-15 RX ORDER — AMLODIPINE BESYLATE 10 MG/1
TABLET ORAL
Qty: 90 TABLET | Refills: 0 | Status: SHIPPED | OUTPATIENT
Start: 2019-04-15 | End: 2019-09-23

## 2019-04-15 NOTE — TELEPHONE ENCOUNTER
Prescription approved per [facility] Refill Protocol.    Charo RENE RN  EP Triage

## 2019-04-15 NOTE — TELEPHONE ENCOUNTER
"Requested Prescriptions   Pending Prescriptions Disp Refills     amLODIPine (NORVASC) 10 MG tablet [Pharmacy Med Name: AMLODIPINE  Last Written Prescription Date:  1/8/19  Last Fill Quantity: 90,  # refills: 0   Last office visit: 1/16/2019 with prescribing provider:  1/16/19   Future Office Visit:     BESYLATE 10MG TABLETS] 90 tablet 0     Sig: TAKE 1 TABLET(10 MG) BY MOUTH DAILY       Calcium Channel Blockers Protocol  Passed - 4/14/2019  9:58 AM        Passed - Blood pressure under 140/90 in past 12 months     BP Readings from Last 3 Encounters:   01/16/19 112/70   10/25/18 104/70   06/20/18 110/78                 Passed - Recent (12 mo) or future (30 days) visit within the authorizing provider's specialty     Patient had office visit in the last 12 months or has a visit in the next 30 days with authorizing provider or within the authorizing provider's specialty.  See \"Patient Info\" tab in inbasket, or \"Choose Columns\" in Meds & Orders section of the refill encounter.              Passed - Medication is active on med list        Passed - Patient is age 18 or older        Passed - No active pregnancy on record        Passed - Normal serum creatinine on file in past 12 months     Recent Labs   Lab Test 10/25/18  1622   CR 0.84             Passed - No positive pregnancy test in past 12 months        labetalol (NORMODYNE) 100 MG tablet [Pharmacy Med Name: LABETALOL  Last Written Prescription Date:  12/11/18  Last Fill Quantity: 60,  # refills: 3   Last office visit: 1/16/2019 with prescribing provider:  1/16/19   Future Office Visit:     100MG TABLETS] 60 tablet 0     Sig: TAKE 1 TABLET(100 MG) BY MOUTH TWICE DAILY       Beta-Blockers Protocol Passed - 4/14/2019  9:58 AM        Passed - Blood pressure under 140/90 in past 12 months     BP Readings from Last 3 Encounters:   01/16/19 112/70   10/25/18 104/70   06/20/18 110/78                 Passed - Patient is age 6 or older        Passed - Recent (12 mo) or future " "(30 days) visit within the authorizing provider's specialty     Patient had office visit in the last 12 months or has a visit in the next 30 days with authorizing provider or within the authorizing provider's specialty.  See \"Patient Info\" tab in inbasket, or \"Choose Columns\" in Meds & Orders section of the refill encounter.              Passed - Medication is active on med list          "

## 2019-04-15 NOTE — TELEPHONE ENCOUNTER
"Requested Prescriptions   Pending Prescriptions Disp Refills     amLODIPine (NORVASC) 10 MG tablet [Pharmacy Med Name: AMLODIPINE  Last Written Prescription Date:  1/8/19  Last Fill Quantity: 90,  # refills: 0   Last office visit: 1/16/2019 with prescribing provider:  1/16/19   Future Office Visit:     BESYLATE 10MG TABLETS] 90 tablet 0     Sig: TAKE 1 TABLET(10 MG) BY MOUTH DAILY       Calcium Channel Blockers Protocol  Passed - 4/13/2019  8:56 AM        Passed - Blood pressure under 140/90 in past 12 months     BP Readings from Last 3 Encounters:   01/16/19 112/70   10/25/18 104/70   06/20/18 110/78                 Passed - Recent (12 mo) or future (30 days) visit within the authorizing provider's specialty     Patient had office visit in the last 12 months or has a visit in the next 30 days with authorizing provider or within the authorizing provider's specialty.  See \"Patient Info\" tab in inbasket, or \"Choose Columns\" in Meds & Orders section of the refill encounter.              Passed - Medication is active on med list        Passed - Patient is age 18 or older        Passed - No active pregnancy on record        Passed - Normal serum creatinine on file in past 12 months     Recent Labs   Lab Test 10/25/18  1622   CR 0.84             Passed - No positive pregnancy test in past 12 months        labetalol (NORMODYNE) 100 MG tablet [Pharmacy Med Name: LABETALOL  Last Written Prescription Date:  12/11/18  Last Fill Quantity: 60,  # refills: 3   Last office visit: 1/16/2019 with prescribing provider:  1/16/19   Future Office Visit:     100MG TABLETS] 60 tablet 0     Sig: TAKE 1 TABLET(100 MG) BY MOUTH TWICE DAILY       Beta-Blockers Protocol Passed - 4/13/2019  8:56 AM        Passed - Blood pressure under 140/90 in past 12 months     BP Readings from Last 3 Encounters:   01/16/19 112/70   10/25/18 104/70   06/20/18 110/78                 Passed - Patient is age 6 or older        Passed - Recent (12 mo) or future " "(30 days) visit within the authorizing provider's specialty     Patient had office visit in the last 12 months or has a visit in the next 30 days with authorizing provider or within the authorizing provider's specialty.  See \"Patient Info\" tab in inbasket, or \"Choose Columns\" in Meds & Orders section of the refill encounter.              Passed - Medication is active on med list          "

## 2019-04-15 NOTE — TELEPHONE ENCOUNTER
Prescription approved per [facility].  Parul Mcclelland RN

## 2019-09-07 DIAGNOSIS — I10 ESSENTIAL HYPERTENSION WITH GOAL BLOOD PRESSURE LESS THAN 140/90: ICD-10-CM

## 2019-09-07 DIAGNOSIS — I10 ESSENTIAL HYPERTENSION: ICD-10-CM

## 2019-09-07 DIAGNOSIS — I10 HTN, GOAL BELOW 140/90: ICD-10-CM

## 2019-09-09 RX ORDER — LABETALOL 100 MG/1
TABLET, FILM COATED ORAL
Qty: 180 TABLET | Refills: 1 | Status: SHIPPED | OUTPATIENT
Start: 2019-09-09 | End: 2020-04-24

## 2019-09-09 RX ORDER — AMLODIPINE BESYLATE 10 MG/1
TABLET ORAL
Qty: 90 TABLET | Refills: 1 | Status: SHIPPED | OUTPATIENT
Start: 2019-09-09 | End: 2019-10-14

## 2019-09-09 NOTE — TELEPHONE ENCOUNTER
"Requested Prescriptions   Pending Prescriptions Disp Refills     amLODIPine (NORVASC) 10 MG tablet [Pharmacy Med Name: AMLODIPINE  Last Written Prescription Date:  4-  Last Fill Quantity: 90 tablet,  # refills: 1   Last office visit: 1/16/2019 with prescribing provider:     Future Office Visit:     BESYLATE 10MG TABLETS] 90 tablet 0     Sig: TAKE 1 TABLET(10 MG) BY MOUTH DAILY       Calcium Channel Blockers Protocol  Passed - 9/7/2019 12:50 PM        Passed - Blood pressure under 140/90 in past 12 months     BP Readings from Last 3 Encounters:   01/16/19 112/70   10/25/18 104/70   06/20/18 110/78                 Passed - Recent (12 mo) or future (30 days) visit within the authorizing provider's specialty     Patient had office visit in the last 12 months or has a visit in the next 30 days with authorizing provider or within the authorizing provider's specialty.  See \"Patient Info\" tab in inbasket, or \"Choose Columns\" in Meds & Orders section of the refill encounter.              Passed - Medication is active on med list        Passed - Patient is age 18 or older        Passed - No active pregnancy on record        Passed - Normal serum creatinine on file in past 12 months     Recent Labs   Lab Test 10/25/18  1622   CR 0.84             Passed - No positive pregnancy test in past 12 months                  labetalol (NORMODYNE) 100 MG tablet [Pharmacy Med Name: LABETALOL  Last Written Prescription Date:  4-  Last Fill Quantity: 180 tablet,  # refills: 1   Last office visit: 1/16/2019 with prescribing provider:     Future Office Visit:     100MG TABLETS] 180 tablet 0     Sig: TAKE 1 TABLET(100 MG) BY MOUTH TWICE DAILY       Beta-Blockers Protocol Passed - 9/7/2019 12:50 PM        Passed - Blood pressure under 140/90 in past 12 months     BP Readings from Last 3 Encounters:   01/16/19 112/70   10/25/18 104/70   06/20/18 110/78                 Passed - Patient is age 6 or older        Passed - Recent (12 " "mo) or future (30 days) visit within the authorizing provider's specialty     Patient had office visit in the last 12 months or has a visit in the next 30 days with authorizing provider or within the authorizing provider's specialty.  See \"Patient Info\" tab in inbasket, or \"Choose Columns\" in Meds & Orders section of the refill encounter.              Passed - Medication is active on med list          "

## 2019-09-11 ENCOUNTER — TELEPHONE (OUTPATIENT)
Dept: FAMILY MEDICINE | Facility: CLINIC | Age: 44
End: 2019-09-11

## 2019-09-23 ENCOUNTER — OFFICE VISIT (OUTPATIENT)
Dept: FAMILY MEDICINE | Facility: CLINIC | Age: 44
End: 2019-09-23
Payer: COMMERCIAL

## 2019-09-23 VITALS
HEART RATE: 68 BPM | DIASTOLIC BLOOD PRESSURE: 84 MMHG | TEMPERATURE: 97.5 F | OXYGEN SATURATION: 100 % | HEIGHT: 63 IN | SYSTOLIC BLOOD PRESSURE: 118 MMHG | WEIGHT: 147 LBS | BODY MASS INDEX: 26.05 KG/M2

## 2019-09-23 DIAGNOSIS — Z00.00 ROUTINE HISTORY AND PHYSICAL EXAMINATION OF ADULT: Primary | ICD-10-CM

## 2019-09-23 DIAGNOSIS — I10 ESSENTIAL HYPERTENSION: ICD-10-CM

## 2019-09-23 DIAGNOSIS — M94.0 COSTOCHONDRITIS: ICD-10-CM

## 2019-09-23 DIAGNOSIS — Z13.6 CARDIOVASCULAR SCREENING; LDL GOAL LESS THAN 160: ICD-10-CM

## 2019-09-23 DIAGNOSIS — Z12.39 SCREENING FOR BREAST CANCER: ICD-10-CM

## 2019-09-23 DIAGNOSIS — Z23 NEED FOR PROPHYLACTIC VACCINATION AND INOCULATION AGAINST INFLUENZA: ICD-10-CM

## 2019-09-23 PROCEDURE — 80061 LIPID PANEL: CPT | Performed by: FAMILY MEDICINE

## 2019-09-23 PROCEDURE — 87624 HPV HI-RISK TYP POOLED RSLT: CPT | Performed by: FAMILY MEDICINE

## 2019-09-23 PROCEDURE — 99213 OFFICE O/P EST LOW 20 MIN: CPT | Mod: 25 | Performed by: FAMILY MEDICINE

## 2019-09-23 PROCEDURE — 80053 COMPREHEN METABOLIC PANEL: CPT | Performed by: FAMILY MEDICINE

## 2019-09-23 PROCEDURE — 36415 COLL VENOUS BLD VENIPUNCTURE: CPT | Performed by: FAMILY MEDICINE

## 2019-09-23 PROCEDURE — 90471 IMMUNIZATION ADMIN: CPT | Performed by: FAMILY MEDICINE

## 2019-09-23 PROCEDURE — 99396 PREV VISIT EST AGE 40-64: CPT | Mod: 25 | Performed by: FAMILY MEDICINE

## 2019-09-23 PROCEDURE — G0145 SCR C/V CYTO,THINLAYER,RESCR: HCPCS | Performed by: FAMILY MEDICINE

## 2019-09-23 PROCEDURE — 90686 IIV4 VACC NO PRSV 0.5 ML IM: CPT | Performed by: FAMILY MEDICINE

## 2019-09-23 ASSESSMENT — MIFFLIN-ST. JEOR: SCORE: 1290.92

## 2019-09-23 NOTE — PROGRESS NOTES
SUBJECTIVE:   CC: Juany Blandon is an 43 year old woman who presents for preventive health visit.     Healthy Habits:    Getting at least 3 servings of Calcium per day:  Yes    Bi-annual eye exam:  Yes    Dental care twice a year:  Yes    Sleep apnea or symptoms of sleep apnea:  None    Diet:  Regular (no restrictions)    Frequency of exercise:  1 day/week    Duration of exercise:  15-30 minutes    Taking medications regularly:  Yes    Barriers to taking medications:  None    Medication side effects:  None    PHQ-2 Total Score:    Additional concerns today:  No  Imm/Inj                PROBLEMS TO ADD ON...  Noted some sharp chest pain few days ago, sitting resting in evening , no associated sob or any significant palpitation sob diaphoresis etc. She thinsk she may have felt  slightly dizzy may be bc she was nervous so  checked her BP it was low normal. Her BP is always on  low side,   No associated gi sx although sometimes has GERD but not as bad.  she is still doing her cardio exercises etc and that does not trigger any chest pain , sob, dizziness etc  Had ct scan of  For calcium screening last year per cardiology and  It  was normal    Hypertension Follow-up      Do you check your blood pressure regularly outside of the clinic? Yes BP is mostly ok     Are you following a low salt diet? Yes    Are your blood pressures ever more than 140 on the top number (systolic) OR more   than 90 on the bottom number (diastolic), for example 140/90? No    Today's PHQ-2 Score:   PHQ-2 ( 1999 Pfizer) 9/23/2019   Q1: Little interest or pleasure in doing things 0   Q2: Feeling down, depressed or hopeless 0   PHQ-2 Score 0       Abuse: Current or Past(Physical, Sexual or Emotional)- No  Do you feel safe in your environment? Yes    Social History     Tobacco Use     Smoking status: Former Smoker     Packs/day: 0.50     Years: 8.00     Pack years: 4.00     Types: Cigarettes     Start date: 2002     Last attempt to quit:  2010     Years since quittin.6     Smokeless tobacco: Never Used   Substance Use Topics     Alcohol use: No     Alcohol/week: 0.0 standard drinks     If you drink alcohol do you typically have >3 drinks per day or >7 drinks per week? No    Alcohol Use 2017   Prescreen: >3 drinks/day or >7 drinks/week? The patient does not drink >3 drinks per day nor >7 drinks per week.   No flowsheet data found.    Reviewed orders with patient.  Reviewed health maintenance and updated orders accordingly - Yes  Patient Active Problem List   Diagnosis     JOINT PAIN-MULT JTS, W/U NEG ~     CARDIOVASCULAR SCREENING; LDL GOAL LESS THAN 160     Former smoker     Lumbar radiculopathy     Gastroesophageal reflux disease, esophagitis presence not specified     Seasonal allergic rhinitis     Essential hypertension with goal blood pressure less than 140/90     BMI 26.0-26.9,adult     Atherosclerosis of both carotid arteries     Precordial pain     Past Surgical History:   Procedure Laterality Date     C NONSPECIFIC PROCEDURE      L BREAST BX, bening 2014       SECTION             Social History     Tobacco Use     Smoking status: Former Smoker     Packs/day: 0.50     Years: 8.00     Pack years: 4.00     Types: Cigarettes     Start date:      Last attempt to quit: 2010     Years since quittin.6     Smokeless tobacco: Never Used   Substance Use Topics     Alcohol use: No     Alcohol/week: 0.0 standard drinks     Family History   Problem Relation Age of Onset     Blood Disease Father         non higd lymphoma     Gynecology Mother         fibroids     Lipids Mother      Hypertension Mother      Diabetes Maternal Grandmother      Arthritis Paternal Grandmother      Cerebrovascular Disease Maternal Grandfather          of stroke at age 50+     C.A.D. No family hx of      Breast Cancer No family hx of      Cancer - colorectal No family hx of            Mammogram Screening: Patient under age 50,  mutual decision reflected in health maintenance.      Pertinent mammograms are reviewed under the imaging tab.  History of abnormal Pap smear: NO - age 30- 65 PAP every 3 years recommended  PAP / HPV Latest Ref Rng & Units 2016   PAP - NIL NIL NIL   HPV 16 DNA NEG Negative - -   HPV 18 DNA NEG Negative - -   OTHER HR HPV NEG Negative - -     Reviewed and updated as needed this visit by clinical staff         Reviewed and updated as needed this visit by Provider        Past Medical History:   Diagnosis Date     HX FIBROADENOMA L BREAST      JOINT PAIN-MULT RODO, W/U NEG ~      SPONTANEOUS  03        Review of Systems  CONSTITUTIONAL: NEGATIVE for fever, chills, change in weight  INTEGUMENTARU/SKIN: NEGATIVE for worrisome rashes, moles or lesions  EYES: NEGATIVE for vision changes or irritation  ENT: NEGATIVE for ear, mouth and throat problems  RESP: NEGATIVE for significant cough or SOB  BREAST: NEGATIVE for masses, tenderness or discharge  CV: NEGATIVE for chest pain, palpitations or peripheral edema  CV: NEGATIVE for palpitations or peripheral edema and except chest pain,   as per HPI   GI: NEGATIVE for nausea, abdominal pain, heartburn, or change in bowel habits  : NEGATIVE for unusual urinary or vaginal symptoms. Periods are regular.  MUSCULOSKELETAL: NEGATIVE for significant arthralgias or myalgia  NEURO: NEGATIVE for weakness, dizziness or paresthesias  ENDOCRINE: NEGATIVE for temperature intolerance, skin/hair changes  HEME/ALLERGY/IMMUNE: NEGATIVE for bleeding problems  PSYCHIATRIC: NEGATIVE for changes in mood or affect     OBJECTIVE:   There were no vitals taken for this visit.  Physical Exam  GENERAL: healthy, alert and no distress  EYES: Eyes grossly normal to inspection, and conjunctivae and sclerae normal  HENT: ear canals and TM's normal, nose and mouth without ulcers or lesions  NECK: no adenopathy, no asymmetry, masses, or scars and thyroid normal to  palpation  RESP: lungs clear to auscultation - no rales, rhonchi or wheezes  BREAST: normal without masses, tenderness or nipple discharge and no palpable axillary masses or adenopathy  CV: regular rate and rhythm, normal S1 S2, no S3 or S4, no murmur, no peripheral edema and peripheral pulses strong  except has tender left upper costchondral joints ,no swelling or redness    ABDOMEN: soft, nontender, no hepatosplenomegaly, no masses and bowel sounds normal  normal female external genitalia, normal urethral meatus, vaginal mucosa pink, moist, well rugated, and normal cervix/adnexa/uterus without masses or discharge  RECTAL - Declined   MS: no gross musculoskeletal defects noted, no edema   SKIN: no suspicious lesions or rashes  NEURO: Normal strength and tone, mentation intact and speech normal  PSYCH: mentation appears normal, affect normal/bright  LYMPH: no cervical adenopathy        ASSESSMENT/PLAN:   (Z00.00) Routine history and physical examination of adult  (primary encounter diagnosis)  Comment:   Plan: Lipid panel reflex to direct LDL Fasting,         INFLUENZA VACCINE IM > 6 MONTHS VALENT IIV4         [99329], Vaccine Administration, Initial         [54132], Pap imaged thin layer screen with HPV         - recommended age 30 - 65 years (select HPV         order below), HPV High Risk Types DNA Cervical            (Z23) Need for prophylactic vaccination and inoculation against influenza  Comment:   Plan: INFLUENZA VACCINE IM > 6 MONTHS VALENT IIV4         [84653], Vaccine Administration, Initial         [97978]            (Z13.6) CARDIOVASCULAR SCREENING; LDL GOAL LESS THAN 160  Comment:   Plan: Lipid panel reflex to direct LDL Fasting,     (I10) Essential hypertension with goal blood pressure less than 140/90  Comment:   Plan: Comprehensive metabolic panel, Lipid panel reflex to direct LDL Fasting,         BP in adequate control. Discussed cares, low fat low salt  diet etc. Check labs, call pt with results.  "Refill given. encouraged home BP monitoring.    Follow up recheck in 6 months, sooner if problem.       (Z68.26) BMI 26.0-26.9,adult  Comment: improving   Plan: Healthy diet and exercise reviewed.  Encourage exercise.      (Z12.31) Screening for breast cancer  Comment:   Plan: *MA Screening Digital Bilateral            (M94.0) Costochondritis  Comment: left upper chest   Plan:  comfortable taking OTC pain med's Cares and symptomatic treatment discussed follow up if problem       Check labs. refill sent.Cares and  treatment discussed follow. up if problem   Patient expressed understanding and agreement with treatment plan. All patient's questions were answered, will let me know if has more later.  Medications: Rx's: Reviewed the potential side effects/complications of medications prescribed.   COUNSELING:  Reviewed preventive health counseling, as reflected in patient instructions       Regular exercise       Healthy diet/nutrition       Immunizations    Vaccinated for: Influenza             Osteoporosis Prevention/Bone Health    Estimated body mass index is 27.28 kg/m  as calculated from the following:    Height as of 6/20/18: 1.6 m (5' 3\").    Weight as of 1/16/19: 69.9 kg (154 lb).         reports that she quit smoking about 9 years ago. Her smoking use included cigarettes. She started smoking about 17 years ago. She has a 4.00 pack-year smoking history. She has never used smokeless tobacco.      Counseling Resources:  ATP IV Guidelines  Pooled Cohorts Equation Calculator  Breast Cancer Risk Calculator  FRAX Risk Assessment  ICSI Preventive Guidelines  Dietary Guidelines for Americans, 2010  USDA's MyPlate  ASA Prophylaxis  Lung CA Screening    Korina Garcia MD  [facility]  "

## 2019-09-23 NOTE — PATIENT INSTRUCTIONS
Patient Education     Prevention Guidelines, Women Ages 40 to 49  Screening tests and vaccines are an important part of managing your health. A screening test is done to find possible disorders or diseases in people who don't have any symptoms. The goal is to find a disease early so lifestyle changes can be made and you can be watched more closely to reduce the risk of disease, or to detect it early enough to treat it most effectively. Screening tests are not considered diagnostic, but are used to determine if more testing is needed. Health counseling is essential, too. Below are guidelines for these, for women ages 40 to 49. Talk with your healthcare provider to make sure you re up-to-date on what you need.  Screening Who needs it How often   Type 2 diabetes or prediabetes All women beginning at age 45 and women without symptoms at any age who are overweight or obese and have 1 or more additional risk factors for diabetes At least every 3 years1   Type 2 diabetes or prediabetes All women diagnosed with gestational diabetes Lifelong testing every 3 years   Type 2 diabetes All women with prediabetes Every year   Alcohol misuse All women in this age group At routine exams   Blood pressure All women in this age group Yearly checkup if your blood pressure is normal  Normal blood pressure is less than 120/80 mm Hg  If your blood pressure reading is higher than normal, follow the advice of your healthcare provider   Breast cancer All women at average risk in this age group Screening with a mammogram can start at age 40.2 Talk with your healthcare provider to help you decide when to start screening. At age 45 start yearly mammograms.3    Cervical cancer All women in this age group, except women who have had a complete hysterectomy Pap test every 3 years or Pap test plus human papilloma virus (HPV) test every 5 years   Chlamydia Women at increased risk for infection At routine exams if you're at risk or have symptoms    Depression All women in this age group At routine exams   Gonorrhea Sexually active women at increased risk for infection At routine exams   Hepatitis C Anyone at increased risk; 1 time for those born between 1945 and 1965 At routine exams   High cholesterol or triglycerides All women ages 45 and older who are at risk for coronary artery disease; younger women, talk with your healthcare provider At least every 5 years   HIV All women At routine exams. Those with risk factors for HIV should be tested at least annually.   Obesity All women in this age group At routine exams   Syphilis Women at increased risk for infection-talk with your healthcare provider At routine exams   Tuberculosis Women at increased risk for infection-talk with your healthcare provider Ask your healthcare provider   Vision All women in this age group Complete exam at age 40 and eye exams every 2 to 4 years. If you have a chronic disease, ask your healthcare provider how often you should have your eyes examined.4   Vaccine Who needs it How often   Chickenpox (varicella) All women in this age group who have no record of this infection or vaccine 2 doses; the second dose should be given at least 4 weeks after the first dose   Hepatitis A Women at increased risk for infection-talk with your healthcare provider 2 doses given 6 months apart   Hepatitis B Women at increased risk for infection-talk with your healthcare provider 3 doses over 6 months; second dose should be given 1 month after the first dose; the third dose should be given at least 2 months after the second dose and at least 4 months after the first dose   Haemophilus influenzae Type B (HIB) Women at increased risk 1 to 3 doses   Influenza (flu) All women in this age group Once a year   Measles, mumps, rubella (MMR) All women in this age group who have no record of these infections or vaccines 1 or 2 doses   Meningococcal Women at increased risk for infection-talk with your healthcare  provider 1 or more doses   Pneumococcal conjugate vaccine (PCV13) and pneumococcal polysaccharide vaccine (PPSV23) Women at increased risk for infection-talk with your healthcare provider 1 or 2 doses   Tetanus/diphtheria/pertussis (Td/Tdap) booster All women in this age group A one-time dose of Tdap instead of a Td booster after age 18, then Td every 10 years   Counseling Who needs it How often   BRCA gene mutation testing for breast and ovarian cancer susceptibility Women with increased risk for having gene mutation When your risk is known   Breast cancer and chemoprevention Women at high risk for breast cancer When your risk is known   Diet and exercise Women who are overweight or obese When diagnosed, and then at routine exams   Domestic violence Women at the age in which they are able to have children At routine exams   Sexually transmitted infection prevention Women at increased risk for infection-talk with your healthcare provider At routine exams   Use of tobacco and the health effects it can cause All women in this age group Every exam   1American Diabetes Association  2American College of Obstetricians and Gynecologists   3American Cancer Society  4American Academy of Ophthalmology  Date Last Reviewed: 11/1/2017 2000-2018 [employer]. [address] [place], [place] [id number]. All rights reserved. This information is not intended as a substitute for professional medical care. Always follow your healthcare professional's instructions.

## 2019-09-24 LAB
ALBUMIN SERPL-MCNC: 4.2 G/DL (ref 3.4–5)
ALP SERPL-CCNC: 63 U/L (ref 40–150)
ALT SERPL W P-5'-P-CCNC: 23 U/L (ref 0–50)
ANION GAP SERPL CALCULATED.3IONS-SCNC: 2 MMOL/L (ref 3–14)
AST SERPL W P-5'-P-CCNC: 18 U/L (ref 0–45)
BILIRUB SERPL-MCNC: 1.4 MG/DL (ref 0.2–1.3)
BUN SERPL-MCNC: 14 MG/DL (ref 7–30)
CALCIUM SERPL-MCNC: 8.7 MG/DL (ref 8.5–10.1)
CHLORIDE SERPL-SCNC: 105 MMOL/L (ref 94–109)
CHOLEST SERPL-MCNC: 209 MG/DL
CO2 SERPL-SCNC: 29 MMOL/L (ref 20–32)
CREAT SERPL-MCNC: 0.77 MG/DL (ref 0.52–1.04)
GFR SERPL CREATININE-BSD FRML MDRD: >90 ML/MIN/{1.73_M2}
GLUCOSE SERPL-MCNC: 94 MG/DL (ref 70–99)
HDLC SERPL-MCNC: 48 MG/DL
LDLC SERPL CALC-MCNC: 146 MG/DL
NONHDLC SERPL-MCNC: 161 MG/DL
POTASSIUM SERPL-SCNC: 4.2 MMOL/L (ref 3.4–5.3)
PROT SERPL-MCNC: 8 G/DL (ref 6.8–8.8)
SODIUM SERPL-SCNC: 136 MMOL/L (ref 133–144)
TRIGL SERPL-MCNC: 75 MG/DL

## 2019-09-26 LAB
COPATH REPORT: NORMAL
PAP: NORMAL

## 2019-09-27 LAB
FINAL DIAGNOSIS: NORMAL
HPV HR 12 DNA CVX QL NAA+PROBE: NEGATIVE
HPV16 DNA SPEC QL NAA+PROBE: NEGATIVE
HPV18 DNA SPEC QL NAA+PROBE: NEGATIVE
SPECIMEN DESCRIPTION: NORMAL
SPECIMEN SOURCE CVX/VAG CYTO: NORMAL

## 2019-10-02 ENCOUNTER — OFFICE VISIT (OUTPATIENT)
Dept: FAMILY MEDICINE | Facility: CLINIC | Age: 44
End: 2019-10-02
Payer: COMMERCIAL

## 2019-10-02 VITALS
BODY MASS INDEX: 25.86 KG/M2 | SYSTOLIC BLOOD PRESSURE: 110 MMHG | DIASTOLIC BLOOD PRESSURE: 70 MMHG | TEMPERATURE: 97.6 F | HEART RATE: 75 BPM | OXYGEN SATURATION: 100 % | WEIGHT: 146 LBS

## 2019-10-02 DIAGNOSIS — J01.90 ACUTE SINUSITIS WITH SYMPTOMS > 10 DAYS: Primary | ICD-10-CM

## 2019-10-02 PROCEDURE — 99213 OFFICE O/P EST LOW 20 MIN: CPT | Performed by: FAMILY MEDICINE

## 2019-10-02 RX ORDER — AZITHROMYCIN 250 MG/1
TABLET, FILM COATED ORAL
Qty: 6 TABLET | Refills: 0 | Status: SHIPPED | OUTPATIENT
Start: 2019-10-02 | End: 2019-10-14

## 2019-10-02 NOTE — PROGRESS NOTES
Subjective     Juany Blandon is a 43 year old female who presents to clinic today for the following health issues:    HPI     Patient reports that he has had pain behind the left eye and over the left sinus.  Denies any postnasal drainage.  She also feels mild numbness in the lower lateral left lip.  No signs of cold sore developing.  No cough.  She recently  saw dentist as well.  No dental infections were noted.  She has had similar problem in the past which ENT gave her antibiotics and that helped.  Complains of mild nausea.  No vomiting.  No diarrhea.  Reports of change in the taste as well.  Patient Active Problem List   Diagnosis     JOINT PAIN-MULT JTS, W/U NEG ~     CARDIOVASCULAR SCREENING; LDL GOAL LESS THAN 160     Former smoker     Lumbar radiculopathy     Gastroesophageal reflux disease, esophagitis presence not specified     Seasonal allergic rhinitis     Essential hypertension with goal blood pressure less than 140/90     BMI 26.0-26.9,adult     Atherosclerosis of both carotid arteries     Precordial pain     Past Surgical History:   Procedure Laterality Date     C NONSPECIFIC PROCEDURE      L BREAST BX, fela 2014       SECTION             Social History     Tobacco Use     Smoking status: Former Smoker     Packs/day: 0.50     Years: 8.00     Pack years: 4.00     Types: Cigarettes     Start date:      Last attempt to quit: 2010     Years since quittin.6     Smokeless tobacco: Never Used   Substance Use Topics     Alcohol use: No     Alcohol/week: 0.0 standard drinks     Family History   Problem Relation Age of Onset     Blood Disease Father         non higd lymphoma     Gynecology Mother         fibroids     Lipids Mother      Hypertension Mother      Diabetes Maternal Grandmother      Arthritis Paternal Grandmother      Cerebrovascular Disease Maternal Grandfather          of stroke at age 50+     C.A.D. No family hx of      Breast Cancer No family hx of       Cancer - colorectal No family hx of          Current Outpatient Medications   Medication Sig Dispense Refill     amLODIPine (NORVASC) 10 MG tablet TAKE 1 TABLET(10 MG) BY MOUTH DAILY 90 tablet 1     aspirin 81 MG tablet Take 81 mg by mouth daily       azithromycin (ZITHROMAX) 250 MG tablet Two tablets first day, then one tablet daily for four days. 6 tablet 0     Calcium-Magnesium-Vitamin D (CALCIUM 500 PO)        Cholecalciferol (VITAMIN D PO)        fluticasone (FLONASE) 50 MCG/ACT spray Spray 1-2 sprays into both nostrils daily 16 g prn     labetalol (NORMODYNE) 100 MG tablet TAKE 1 TABLET(100 MG) BY MOUTH TWICE DAILY 180 tablet 1     VITAMIN E PO        Allergies   Allergen Reactions     No Known Allergies          Reviewed and updated as needed this visit by Provider         Review of Systems   ROS COMP: INTEGUMENTARY/SKIN: NEGATIVE for worrisome rashes, moles or lesions  GI: NEGATIVE for abdominal pain, heartburn, or change in bowel habits      Objective    /70   Pulse 75   Temp 97.6  F (36.4  C) (Tympanic)   Wt 66.2 kg (146 lb)   LMP 09/02/2019   SpO2 100%   BMI 25.86 kg/m    Body mass index is 25.86 kg/m .  Physical Exam   GENERAL: healthy, alert and no distress  EYES: Eyes grossly normal to inspection, PERRL and conjunctivae and sclerae normal  HENT: ear canals and TM's normal, nose and mouth without ulcers or lesions.  Maxillary sinus tenderness noted on the left..  Lips appear normal.    NECK: no adenopathy, no asymmetry, masses, or scars and thyroid normal to palpation  RESP: lungs clear to auscultation - no rales, rhonchi or wheezes  CV: regular rate and rhythm, normal S1 S2, no S3 or S4, no murmur, click or rub, no peripheral edema and peripheral pulses strong  SKIN: no suspicious lesions or rashes  Neuro: Normal sensation on the face bilaterally.Cranial nerves II through XII intact.          Assessment & Plan     1. Acute sinusitis with symptoms > 10 days  Recommending to start Z-Medardo for  "sinus infection.  Use ibuprofen for pain and inflammation control.  Stay hydrated.  I am unsure if the lip numbness could be initial sign of cold sore.  Instructed to notify us back if symptoms are not improving or any new symptoms develop  - azithromycin (ZITHROMAX) 250 MG tablet; Two tablets first day, then one tablet daily for four days.  Dispense: 6 tablet; Refill: 0     BMI:   Estimated body mass index is 25.86 kg/m  as calculated from the following:    Height as of 9/23/19: 1.6 m (5' 3\").    Weight as of this encounter: 66.2 kg (146 lb).         Return in about 5 days (around 10/7/2019) for If symptoms are not improving.    Nasima Damon MD  [facility] – [place]      "

## 2019-10-06 ENCOUNTER — APPOINTMENT (OUTPATIENT)
Dept: MRI IMAGING | Facility: CLINIC | Age: 44
End: 2019-10-06
Attending: EMERGENCY MEDICINE
Payer: COMMERCIAL

## 2019-10-06 ENCOUNTER — HOSPITAL ENCOUNTER (EMERGENCY)
Facility: CLINIC | Age: 44
Discharge: HOME | End: 2019-10-06
Attending: EMERGENCY MEDICINE | Admitting: EMERGENCY MEDICINE
Payer: COMMERCIAL

## 2019-10-06 VITALS
HEIGHT: 63 IN | SYSTOLIC BLOOD PRESSURE: 119 MMHG | RESPIRATION RATE: 16 BRPM | DIASTOLIC BLOOD PRESSURE: 84 MMHG | HEART RATE: 71 BPM | BODY MASS INDEX: 26.05 KG/M2 | TEMPERATURE: 98.7 F | WEIGHT: 147 LBS | OXYGEN SATURATION: 100 %

## 2019-10-06 DIAGNOSIS — R51.9 ACUTE INTRACTABLE HEADACHE, UNSPECIFIED HEADACHE TYPE: ICD-10-CM

## 2019-10-06 LAB
ANION GAP SERPL CALCULATED.3IONS-SCNC: 1 MMOL/L (ref 3–14)
BASOPHILS # BLD AUTO: 0 10E9/L (ref 0–0.2)
BASOPHILS NFR BLD AUTO: 0.3 %
BUN SERPL-MCNC: 16 MG/DL (ref 7–30)
CALCIUM SERPL-MCNC: 8.6 MG/DL (ref 8.5–10.1)
CHLORIDE SERPL-SCNC: 106 MMOL/L (ref 94–109)
CO2 SERPL-SCNC: 32 MMOL/L (ref 20–32)
CREAT SERPL-MCNC: 0.93 MG/DL (ref 0.52–1.04)
DIFFERENTIAL METHOD BLD: NORMAL
EOSINOPHIL # BLD AUTO: 0.1 10E9/L (ref 0–0.7)
EOSINOPHIL NFR BLD AUTO: 1.5 %
ERYTHROCYTE [DISTWIDTH] IN BLOOD BY AUTOMATED COUNT: 12.2 % (ref 10–15)
GFR SERPL CREATININE-BSD FRML MDRD: 75 ML/MIN/{1.73_M2}
GLUCOSE SERPL-MCNC: 71 MG/DL (ref 70–99)
HCG SERPL QL: NEGATIVE
HCT VFR BLD AUTO: 38.2 % (ref 35–47)
HGB BLD-MCNC: 13.1 G/DL (ref 11.7–15.7)
IMM GRANULOCYTES # BLD: 0 10E9/L (ref 0–0.4)
IMM GRANULOCYTES NFR BLD: 0.1 %
LYMPHOCYTES # BLD AUTO: 2.7 10E9/L (ref 0.8–5.3)
LYMPHOCYTES NFR BLD AUTO: 36.5 %
MCH RBC QN AUTO: 30.8 PG (ref 26.5–33)
MCHC RBC AUTO-ENTMCNC: 34.3 G/DL (ref 31.5–36.5)
MCV RBC AUTO: 90 FL (ref 78–100)
MONOCYTES # BLD AUTO: 0.5 10E9/L (ref 0–1.3)
MONOCYTES NFR BLD AUTO: 6.8 %
NEUTROPHILS # BLD AUTO: 4 10E9/L (ref 1.6–8.3)
NEUTROPHILS NFR BLD AUTO: 54.8 %
NRBC # BLD AUTO: 0 10*3/UL
NRBC BLD AUTO-RTO: 0 /100
PLATELET # BLD AUTO: 256 10E9/L (ref 150–450)
POTASSIUM SERPL-SCNC: 3.6 MMOL/L (ref 3.4–5.3)
RBC # BLD AUTO: 4.25 10E12/L (ref 3.8–5.2)
SODIUM SERPL-SCNC: 139 MMOL/L (ref 133–144)
WBC # BLD AUTO: 7.4 10E9/L (ref 4–11)

## 2019-10-06 PROCEDURE — A9585 GADOBUTROL INJECTION: HCPCS | Performed by: EMERGENCY MEDICINE

## 2019-10-06 PROCEDURE — 96375 TX/PRO/DX INJ NEW DRUG ADDON: CPT | Mod: 59

## 2019-10-06 PROCEDURE — 80048 BASIC METABOLIC PNL TOTAL CA: CPT | Performed by: EMERGENCY MEDICINE

## 2019-10-06 PROCEDURE — 25000125 ZZHC RX 250: Performed by: EMERGENCY MEDICINE

## 2019-10-06 PROCEDURE — 84703 CHORIONIC GONADOTROPIN ASSAY: CPT | Performed by: EMERGENCY MEDICINE

## 2019-10-06 PROCEDURE — 25000128 H RX IP 250 OP 636: Performed by: EMERGENCY MEDICINE

## 2019-10-06 PROCEDURE — 96374 THER/PROPH/DIAG INJ IV PUSH: CPT

## 2019-10-06 PROCEDURE — 25500064 ZZH RX 255 OP 636: Performed by: EMERGENCY MEDICINE

## 2019-10-06 PROCEDURE — 70553 MRI BRAIN STEM W/O & W/DYE: CPT

## 2019-10-06 PROCEDURE — 96361 HYDRATE IV INFUSION ADD-ON: CPT

## 2019-10-06 PROCEDURE — 85025 COMPLETE CBC W/AUTO DIFF WBC: CPT | Performed by: EMERGENCY MEDICINE

## 2019-10-06 PROCEDURE — 99285 EMERGENCY DEPT VISIT HI MDM: CPT | Mod: 25

## 2019-10-06 RX ORDER — PROPARACAINE HYDROCHLORIDE 5 MG/ML
1 SOLUTION/ DROPS OPHTHALMIC ONCE
Status: COMPLETED | OUTPATIENT
Start: 2019-10-06 | End: 2019-10-06

## 2019-10-06 RX ORDER — DEXAMETHASONE SODIUM PHOSPHATE 10 MG/ML
10 INJECTION, SOLUTION INTRAMUSCULAR; INTRAVENOUS ONCE
Status: COMPLETED | OUTPATIENT
Start: 2019-10-06 | End: 2019-10-06

## 2019-10-06 RX ORDER — KETOROLAC TROMETHAMINE 15 MG/ML
15 INJECTION, SOLUTION INTRAMUSCULAR; INTRAVENOUS ONCE
Status: COMPLETED | OUTPATIENT
Start: 2019-10-06 | End: 2019-10-06

## 2019-10-06 RX ORDER — GADOBUTROL 604.72 MG/ML
6 INJECTION INTRAVENOUS ONCE
Status: COMPLETED | OUTPATIENT
Start: 2019-10-06 | End: 2019-10-06

## 2019-10-06 RX ORDER — METOCLOPRAMIDE HYDROCHLORIDE 5 MG/ML
10 INJECTION INTRAMUSCULAR; INTRAVENOUS ONCE
Status: COMPLETED | OUTPATIENT
Start: 2019-10-06 | End: 2019-10-06

## 2019-10-06 RX ADMIN — KETOROLAC TROMETHAMINE 15 MG: 15 INJECTION, SOLUTION INTRAMUSCULAR; INTRAVENOUS at 18:04

## 2019-10-06 RX ADMIN — PROPARACAINE HYDROCHLORIDE 1 DROP: 5 SOLUTION/ DROPS OPHTHALMIC at 19:20

## 2019-10-06 RX ADMIN — DEXAMETHASONE SODIUM PHOSPHATE 10 MG: 10 INJECTION, SOLUTION INTRAMUSCULAR; INTRAVENOUS at 19:19

## 2019-10-06 RX ADMIN — GADOBUTROL 6 ML: 604.72 INJECTION INTRAVENOUS at 18:32

## 2019-10-06 RX ADMIN — METOCLOPRAMIDE 10 MG: 5 INJECTION, SOLUTION INTRAMUSCULAR; INTRAVENOUS at 16:31

## 2019-10-06 RX ADMIN — SODIUM CHLORIDE 1000 ML: 9 INJECTION, SOLUTION INTRAVENOUS at 16:32

## 2019-10-06 ASSESSMENT — ENCOUNTER SYMPTOMS
COUGH: 0
VOMITING: 0
CHILLS: 0
NECK PAIN: 1
FEVER: 0
HEADACHES: 1
LIGHT-HEADEDNESS: 1
NUMBNESS: 1

## 2019-10-06 ASSESSMENT — MIFFLIN-ST. JEOR: SCORE: 1290.92

## 2019-10-06 NOTE — ED PROVIDER NOTES
"  History     Chief Complaint:    Headache (Pt was treated last week for sinus headache on abx, but continues to have the headache. )      PARVEZ Blandon is a 43 year old female who presents to the ED for evaluation for headache. Patient states she has had a constant headache for the last 6 days, and she notes before the headache began she had been experiencing light-headedness. She went to the doctor 4 days ago, and they thought the headache might be related to a sinus infection. She describes the headache as being behind her eye on the left side and it feels like a \"pressure\" rather than a \"pounding\" and it is constant but sometimes gets worse. She notes changing position doesn't make the headache worse. Additionally, she complains of mild numbness on the left side of her face which started with the headache, neck pain, and that her vision feels a little blurry. However, light does not bother her. Furthermore, she denies nasal discharge, vomiting, coughing, fevers, or chills. She states she has never experienced something like this before    Allergies:  The patient has no known drug allergies.    Medications:    Trandate  Naprosyn  Norvasc  Aspirin-calcium carbonate  Lipitor  Prilosec  Mirena  Cholecalciferol  Zithromax    Past Medical History:    Hypertension  Insulin resistance  Habitual aborter  Fibroadenoma breast  GERD  Atherosclerosis of both carotid arteries  Seasonal allergic rhinitis  Lumbar radiculopathy    Past Surgical History:     section    Family History:    Father: Non-hodgkin's lymphoma  Mother: fibroids, hypertension, lipids    Social History:  Former smoker, Quit: 2010  Denies alcohol use  Denies drug use  Marital Status:   [2]    Review of Systems   Constitutional: Negative for chills and fever.   Respiratory: Negative for cough.    Gastrointestinal: Negative for vomiting.   Musculoskeletal: Positive for neck pain.   Neurological: Positive for light-headedness, " "numbness and headaches.   All other systems reviewed and are negative.      Physical Exam   First Vitals:  BP: 128/82  Pulse: 83  Temp: 98.7  F (37.1  C)  Resp: 16  Height: 160 cm (5' 3\")  Weight: 66.7 kg (147 lb)  SpO2: 99 %    Physical Exam  Gen: Pleasant, appears stated age.    Eye:   Pupils are equal, round, and reactive.     Sclera non-injected.   Fundoscopic exam: normal optic discs with sharp margins bilaterally.   Eye pressure with tonopen: 21mmHg on the right, 19mmHg on the left.    ENT:   Moist mucus membranes.     Normal tongue.    Oropharynx without lesions.   No pain with range of motion in the neck.   No tenderness to palpation of temples.    Cardiac:     Normal rate and regular rhythm.    No murmurs, gallops, or rubs.    Pulmonary:     Clear to auscultation bilaterally.    No wheezes, rales, or rhonchi.    Abdomen:     Normal active bowel sounds.     Abdomen is soft and non-distended, without focal tenderness.    Musculoskeletal:     Normal movement of all extremities without evidence for deficit.    Extremities:    No edema.    Skin:   Warm and dry.    Neurologic:    Speech is fluent, cognition is normal.     EOMI, symmetric grimace.     Str 5/5 in RUE, LUE, RLE, LLE.     Fine touch sensation intact in BUE/BLE.   Decreased light touch sensation on the left jaw    Psychiatric:     Normal affect with appropriate interaction with examiner.      Emergency Department Course     Imaging:  MR Brain w/o and w/ contrast:   IMPRESSION: No acute intracranial abnormality. Essentially  unremarkable MRI of the head without and with contrast for the  patient's age. as per radiology    Laboratory:  CBC: WBC: 7.4, HGB: 13.1, PLT: 256    BMP: Glucose 71, Anion Gap: 1 (L), o/w WNL (Creatinine: 0.93)    HCG Qualitative Serum: Negative    Interventions:  1631 Reglan 10 mg IV  1632 NS 1000 mL IV  1804 Toradol 15 mg IV  1919 Decadron 10 mg IV  1920 Alcaine 1 drop Both Eyes    Emergency Department Course:  Nursing notes and " vitals reviewed. (1617) I performed an exam of the patient as documented above.     IV inserted. Medicine administered as documented above. Blood drawn. This was sent to the lab for further testing, results above.     The patient was sent for a MR Brain w/o & w Contrast while in the emergency department, findings above.     1750 I rechecked the patient and discussed the results of her workup thus far. Minimal change in symptoms.    1907 I rechecked the patient and discussed the results of her workup thus far. Slight improvement in symptoms.  Well appearing.    1930 I rechecked the patient and discussed the results of her workup thus far.     Findings and plan explained to the Patient. Patient discharged home with instructions regarding supportive care, medications, and reasons to return. The importance of close follow-up was reviewed.     I personally reviewed the laboratory results with the Patient and answered all related questions prior to discharge.     Impression & Plan      Medical Decision Making:  Patient is a 43 year old female with history of hypertension who presents today with about a week of retro-orbital left sided headache. The patient has non-focal neurologic exam, and is well appearing. She does not have symptoms of signs on exam to suggest acute sinus infection at this time. She does complain of some left-sided facial numbness. For this reason imaging was obtained. Fortunately, this is negative for intercranial hemorrhage, mass, edema, or stroke.There is some mention of a T2 hyperintensity within the white matter which may represent age-related ischemic changes. I discussed this briefly with the patient although it seems that based on the radiologist's interpretation this is likely within normal variation for the patient's age. I doubt that this is contributing to the patient' symtoms today. She has had slight but minimal relief with the above interventions in the ED. Other evaluation included  palpation of temporal arteries which were non-tender. She has no sign of glaucoma or papilledema on exam. At this point, I did not see any sign to suggest subarachnoid hemorrhage, meningitis, encephalitis, intercranial hypertension, venus sinus thrombosis, arterio dissection, or other dangerous condition. I recommended follow up with PCP or otherwise seeing neurology if headaches persist. She is to report to the ED for any worsening of her symptoms or new symptoms such as fevers, chills numbness or weakness, or for any other concerns.       Diagnosis:    ICD-10-CM    1. Acute intractable headache, unspecified headache type R51        Disposition:  discharged to home    Discharge Medications:  Discharge Medication List as of 10/6/2019  7:37 PM      Scribe Disclosure:  I, Roderick Manning, am serving as a scribe on 10/6/2019 at 4:17 PM to personally document services performed by Francisca Najera MD based on my observations and the provider's statements to me.       Roderick Manning  10/6/2019    EMERGENCY DEPARTMENT       Francisca Najera MD  10/07/19 [id number]

## 2019-10-06 NOTE — ED AVS SNAPSHOT
Emergency Department  [address] [place] [address]  Phone:  [phone]  Fax:  [phone]                                    Juany Blandon   MRN: [id number]    Department:   Emergency Department   Date of Visit:  10/6/2019           After Visit Summary Signature Page    I have received my discharge instructions, and my questions have been answered. I have discussed any challenges I see with this plan with the nurse or doctor.    ..........................................................................................................................................  Patient/Patient Representative Signature      ..........................................................................................................................................  Patient Representative Print Name and Relationship to Patient    ..................................................               ................................................  Date                                   Time    ..........................................................................................................................................  Reviewed by Signature/Title    ...................................................              ..............................................  Date                                               Time          22EPIC Rev 08/18

## 2019-10-10 ENCOUNTER — TRANSFERRED RECORDS (OUTPATIENT)
Dept: HEALTH INFORMATION MANAGEMENT | Facility: CLINIC | Age: 44
End: 2019-10-10

## 2019-10-13 DIAGNOSIS — I10 ESSENTIAL HYPERTENSION WITH GOAL BLOOD PRESSURE LESS THAN 140/90: ICD-10-CM

## 2019-10-14 ENCOUNTER — OFFICE VISIT (OUTPATIENT)
Dept: FAMILY MEDICINE | Facility: CLINIC | Age: 44
End: 2019-10-14
Payer: COMMERCIAL

## 2019-10-14 VITALS
WEIGHT: 149 LBS | DIASTOLIC BLOOD PRESSURE: 68 MMHG | TEMPERATURE: 99.8 F | HEIGHT: 63 IN | BODY MASS INDEX: 26.4 KG/M2 | HEART RATE: 78 BPM | OXYGEN SATURATION: 99 % | SYSTOLIC BLOOD PRESSURE: 108 MMHG

## 2019-10-14 DIAGNOSIS — J30.89 SEASONAL ALLERGIC RHINITIS DUE TO OTHER ALLERGIC TRIGGER: ICD-10-CM

## 2019-10-14 DIAGNOSIS — I10 ESSENTIAL HYPERTENSION WITH GOAL BLOOD PRESSURE LESS THAN 140/90: ICD-10-CM

## 2019-10-14 DIAGNOSIS — J02.0 STREPTOCOCCAL SORE THROAT: ICD-10-CM

## 2019-10-14 DIAGNOSIS — R42 VERTIGO: ICD-10-CM

## 2019-10-14 DIAGNOSIS — M54.2 NECK PAIN: ICD-10-CM

## 2019-10-14 DIAGNOSIS — G47.00 INSOMNIA, UNSPECIFIED TYPE: ICD-10-CM

## 2019-10-14 DIAGNOSIS — G44.89 OTHER HEADACHE SYNDROME: Primary | ICD-10-CM

## 2019-10-14 DIAGNOSIS — R09.81 NASAL CONGESTION: ICD-10-CM

## 2019-10-14 DIAGNOSIS — M26.609 TMJ (TEMPOROMANDIBULAR JOINT SYNDROME): ICD-10-CM

## 2019-10-14 LAB
DEPRECATED S PYO AG THROAT QL EIA: NORMAL
SPECIMEN SOURCE: NORMAL

## 2019-10-14 PROCEDURE — 87880 STREP A ASSAY W/OPTIC: CPT | Performed by: FAMILY MEDICINE

## 2019-10-14 PROCEDURE — 87081 CULTURE SCREEN ONLY: CPT | Performed by: FAMILY MEDICINE

## 2019-10-14 PROCEDURE — 99214 OFFICE O/P EST MOD 30 MIN: CPT | Performed by: FAMILY MEDICINE

## 2019-10-14 RX ORDER — AMLODIPINE BESYLATE 10 MG/1
TABLET ORAL
Qty: 90 TABLET | Refills: 0 | OUTPATIENT
Start: 2019-10-14

## 2019-10-14 RX ORDER — FLUTICASONE PROPIONATE 50 MCG
1-2 SPRAY, SUSPENSION (ML) NASAL DAILY
Qty: 16 G | Status: SHIPPED | OUTPATIENT
Start: 2019-10-14 | End: 2020-11-12

## 2019-10-14 RX ORDER — AMLODIPINE BESYLATE 10 MG/1
10 TABLET ORAL DAILY
Qty: 90 TABLET | Refills: 1 | Status: SHIPPED | OUTPATIENT
Start: 2019-10-14 | End: 2020-04-07

## 2019-10-14 RX ORDER — NAPROXEN 500 MG/1
500 TABLET ORAL 2 TIMES DAILY WITH MEALS
Qty: 30 TABLET | Refills: 0 | Status: SHIPPED | OUTPATIENT
Start: 2019-10-14 | End: 2020-04-24

## 2019-10-14 ASSESSMENT — MIFFLIN-ST. JEOR: SCORE: 1299.99

## 2019-10-14 NOTE — PROGRESS NOTES
Subjective     Juany Blandon is a 43 year old female who presents to clinic today for the following health issues:    HPI    ER / SINUS SYMPTOMS Follow Up:      Duration: Over one month,    She was treated for a sinus infection earlier this she was also seen at the ER on 10/6/2019, because of her bad headache and some ongoing symptoms.    Had normal brain MRI. had see neurologist as well last week for follow-up since after the ER visit, and he was not too concerned     Description  Pt reported still some dizziness, more like a vertigo feeling sometimes.  She has had history of vertigo before so she is debating she should go see the ENT would like to get a referral.   Also complains of  postnasal drip, some nasal congestion and sinus pressure,  felt a little sore throat last week again, son was sick so she is worried she might be catching something from him.   Has been feeling more fatigue.  She had some numbness left side of face on her jaw area and facial pain on the left side of the face     and ongoing sinus pressure especially behind the eyes.  She also feels pressure and pain in the back of her neck especially more so on the left side.   Her HA has improved since the ER visit , although still has some mild head pressure and neck stiffness and she takes Advil almost twice daily and it does help,  on and off since last few weeks   sometimes has sob sensation but no chest pain , but has sensation of possible tightness, unsure if it could be anxiety.   No exertional chest pain sob, wheezing  No cough. Has history of mild allergy related sinus pressure but no  significant drainage etc .      No taking Allegra or nose spray bc It is not too bad.    Severity: moderate    Accompanying signs and symptoms:  No fever or chills some stress but not bad , sleeping is not good. Hard to fall sleep, but sleeps through the night . Mood is not too bad     History (predisposing factors):  none    Precipitating or alleviating  factors: None    Therapies tried and outcome:  Antibiotic,rest and fluids     PROBLEMS TO ADD ON...    Patient Active Problem List   Diagnosis     JOINT PAIN-MULT JTS, W/U NEG ~     CARDIOVASCULAR SCREENING; LDL GOAL LESS THAN 160     Former smoker     Lumbar radiculopathy     Gastroesophageal reflux disease, esophagitis presence not specified     Seasonal allergic rhinitis     Essential hypertension with goal blood pressure less than 140/90     BMI 26.0-26.9,adult     Atherosclerosis of both carotid arteries     Precordial pain     Past Surgical History:   Procedure Laterality Date     C NONSPECIFIC PROCEDURE      L BREAST BX, bening 2014       SECTION             Social History     Tobacco Use     Smoking status: Former Smoker     Packs/day: 0.50     Years: 8.00     Pack years: 4.00     Types: Cigarettes     Start date:      Last attempt to quit: 2010     Years since quittin.6     Smokeless tobacco: Never Used   Substance Use Topics     Alcohol use: No     Alcohol/week: 0.0 standard drinks     Family History   Problem Relation Age of Onset     Blood Disease Father         non higd lymphoma     Gynecology Mother         fibroids     Lipids Mother      Hypertension Mother      Diabetes Maternal Grandmother      Arthritis Paternal Grandmother      Cerebrovascular Disease Maternal Grandfather          of stroke at age 50+     C.A.D. No family hx of      Breast Cancer No family hx of      Cancer - colorectal No family hx of              Reviewed and updated as needed this visit by Provider         Review of Systems   ROS COMP: Constitutional, HEENT, cardiovascular, pulmonary, GI, , musculoskeletal, neuro, skin, endocrine and psych systems are negative, except as otherwise noted.      Objective    There were no vitals taken for this visit.  There is no height or weight on file to calculate BMI.  Physical Exam   GENERAL: healthy, alert and no distress  EYES: Eyes grossly normal to  inspection, and conjunctivae and sclerae normal  HENT: ear canals and TM's normal, nasal mucosa edematous , oropharynx clear minimal pharyngeal erythema and oral mucous membranes moist, she has tenderness in the left TMJ area  NECK: no adenopathy, no asymmetry, masses, or scars and thyroid normal to palpation  RESP: lungs clear to auscultation - no rales, rhonchi or wheezes  CV: regular rate and rhythm, normal S1 S2, no S3 or S4, no murmur, click or rub, no peripheral edema and peripheral pulses strong  ABDOMEN: soft, nontender, no hepatosplenomegaly, no masses and bowel sounds normal  MS:neck with slight  decreased range of motion  and tenderness to palpation mostly left trapezius and paracervical muscles.  Range of motion range of motion of the neck aggravates some discomfort in the left side of the neck  NEURO: Normal strength and tone, mentation intact and speech normal  PSYCH: mentation appears normal, affect normal slightly uptight, anxious, fatigued, speech pressured, judgement and insight intact and appearance well groomed            Assessment & Plan     (G44.89) Other headache syndrome  (primary encounter diagnosis)  Comment: multifactorial/ neck and tmj related   Plan: [facility],         naproxen (NAPROSYN) 500 MG tablet            (I10) Essential hypertension with goal blood pressure less than 140/90  Comment: Stable  Plan: amLODIPine (NORVASC) 10 MG tablet            (M26.609) TMJ (temporomandibular joint syndrome)  Comment: left , could be contributing to ongoing symptoms  Plan: [facility] REFERR[facility],         naproxen (NAPROSYN) 500 MG tablet            (M54.2) Neck pain  Comment: left   Plan: [facility] REFERRAL,         naproxen (NAPROSYN) 500 MG tablet             discussed  cares and symptomatic treatment including  adequate pain control, heat,  stretches etc. given Naprosyn to help with the pain.  Also talked     about avoiding  excessive OTC pain medication because of concern of possible rebound headaches etc..  and referral given to physical therapy to help       she will do follow up if no improvement or problem. Consider further evaluation if needed.     (G47.00) Insomnia, unspecified type  Comment:   Plan: Discussed sleep hygiene.  Discussed watching.  Consider taking some OTC melatonin to help if needed.  Follow-up if problem.     (R09.81) Nasal congestion  Comment:   Plan: fluticasone (FLONASE) 50 MCG/ACT nasal spray            (J30.89) Seasonal allergic rhinitis due to other allergic trigger  Comment: We will to try Flonase/Allegra  Plan: fluticasone (FLONASE) 50 MCG/ACT nasal spray            (R42) Vertigo  Comment:   Plan: Be related to ongoing multiple symptoms that she is experiencing.  Her brain MRI was normal at a year.  She is really wish to go see the ENT to further evaluate.  Referral has been given previously.  In the meantime  cares and symptomatic treatment discussed.  follow up if problem         (J02.0) Streptococcal sore throat  Comment:   Plan: Strep, Rapid Screen, Beta strep group A culture          Rapid strep negative, strep culture pending. Call and treat only if positive. In the meantime cares and symptomatic treatment discussed follow up if problem     Cares and  treatment discussed.  follow up if problem   Patient expressed understanding and agreement with treatment plan. All patient's questions were answered, will let me know if has more later.  Medications: Rx's: Reviewed the potential side effects/complications of medications prescribed.       Return sooner if problem .    Korina Garcia MD  [facility] [place]

## 2019-10-14 NOTE — TELEPHONE ENCOUNTER
"Requested Prescriptions   Pending Prescriptions Disp Refills     amLODIPine (NORVASC) 10 MG tablet [Pharmacy Med Name: AMLODIPINE BESYLATE 10MG TABLETS] 90 tablet 0     Sig: TAKE 1 TABLET(10 MG) BY MOUTH DAILY   Last Written Prescription Date:  9/9/19  Last Fill Quantity: 90,  # refills: 1   Last office visit: 10/2/2019 with prescribing provider:  Nelson    Future Office Visit:   Next 5 appointments (look out 90 days)    Oct 14, 2019 11:20 AM CDT  Office Visit with Korina Garcia MD  [facility] [place] ([facility] [place]) [address] [place] [id number]  [phone]             Calcium Channel Blockers Protocol  Passed - 10/13/2019  2:03 PM        Passed - Blood pressure under 140/90 in past 12 months     BP Readings from Last 3 Encounters:   10/06/19 119/84   10/02/19 110/70   09/23/19 118/84                 Passed - Recent (12 mo) or future (30 days) visit within the authorizing provider's specialty     Patient has had an office visit with the authorizing provider or a provider within the authorizing providers department within the previous 12 mos or has a future within next 30 days. See \"Patient Info\" tab in inbasket, or \"Choose Columns\" in Meds & Orders section of the refill encounter.              Passed - Medication is active on med list        Passed - Patient is age 18 or older        Passed - No active pregnancy on record        Passed - Normal serum creatinine on file in past 12 months     Recent Labs   Lab Test 10/06/19  1611   CR 0.93             Passed - No positive pregnancy test in past 12 months          "

## 2019-10-15 LAB
BACTERIA SPEC CULT: NORMAL
SPECIMEN SOURCE: NORMAL

## 2019-10-22 ENCOUNTER — ANCILLARY PROCEDURE (OUTPATIENT)
Dept: MAMMOGRAPHY | Facility: CLINIC | Age: 44
End: 2019-10-22
Attending: FAMILY MEDICINE
Payer: COMMERCIAL

## 2019-10-22 DIAGNOSIS — Z12.39 SCREENING FOR BREAST CANCER: ICD-10-CM

## 2019-10-22 PROCEDURE — 77067 SCR MAMMO BI INCL CAD: CPT | Mod: TC

## 2019-10-22 PROCEDURE — 77063 BREAST TOMOSYNTHESIS BI: CPT | Mod: TC

## 2019-10-24 ENCOUNTER — TRANSFERRED RECORDS (OUTPATIENT)
Dept: HEALTH INFORMATION MANAGEMENT | Facility: CLINIC | Age: 44
End: 2019-10-24

## 2019-10-24 DIAGNOSIS — I10 ESSENTIAL HYPERTENSION: ICD-10-CM

## 2019-10-24 DIAGNOSIS — I10 HTN, GOAL BELOW 140/90: ICD-10-CM

## 2019-10-24 RX ORDER — LABETALOL 100 MG/1
TABLET, FILM COATED ORAL
Qty: 180 TABLET | Refills: 0 | OUTPATIENT
Start: 2019-10-24

## 2019-10-24 NOTE — TELEPHONE ENCOUNTER
"Requested Prescriptions   Pending Prescriptions Disp Refills     labetalol (NORMODYNE) 100 MG tablet [Pharmacy Med Name: LABETALOL  Last Written Prescription Date:  9-9-2019  Last Fill Quantity: 180 tablet,  # refills: 1   Last office visit: 10/14/2019 with prescribing provider:     Future Office Visit:     100MG TABLETS] 180 tablet 0     Sig: TAKE 1 TABLET(100 MG) BY MOUTH TWICE DAILY       Beta-Blockers Protocol Passed - 10/24/2019  9:54 AM        Passed - Blood pressure under 140/90 in past 12 months     BP Readings from Last 3 Encounters:   10/14/19 108/68   10/06/19 119/84   10/02/19 110/70                 Passed - Patient is age 6 or older        Passed - Recent (12 mo) or future (30 days) visit within the authorizing provider's specialty     Patient has had an office visit with the authorizing provider or a provider within the authorizing providers department within the previous 12 mos or has a future within next 30 days. See \"Patient Info\" tab in inbasket, or \"Choose Columns\" in Meds & Orders section of the refill encounter.              Passed - Medication is active on med list          "

## 2019-10-25 RX ORDER — LABETALOL 100 MG/1
TABLET, FILM COATED ORAL
Qty: 180 TABLET | Refills: 0 | OUTPATIENT
Start: 2019-10-25

## 2019-10-25 NOTE — TELEPHONE ENCOUNTER
"Requested Prescriptions   Pending Prescriptions Disp Refills     labetalol (NORMODYNE) 100 MG tablet [Pharmacy Med Name: LABETALOL 100MG TABLETS] 180 tablet 0     Sig: TAKE 1 TABLET(100 MG) BY MOUTH TWICE DAILY       Beta-Blockers Protocol Passed - 10/24/2019  8:21 PM        Passed - Blood pressure under 140/90 in past 12 months     BP Readings from Last 3 Encounters:   10/14/19 108/68   10/06/19 119/84   10/02/19 110/70                 Passed - Patient is age 6 or older        Passed - Recent (12 mo) or future (30 days) visit within the authorizing provider's specialty     Patient has had an office visit with the authorizing provider or a provider within the authorizing providers department within the previous 12 mos or has a future within next 30 days. See \"Patient Info\" tab in inbasket, or \"Choose Columns\" in Meds & Orders section of the refill encounter.              Passed - Medication is active on med list        Last Written Prescription Date:  9/9/2019  Last Fill Quantity: 180,  # refills: 1   Last office visit: 10/14/2019 with prescribing provider:  10/14/2019   Future Office Visit:      "

## 2019-10-25 NOTE — TELEPHONE ENCOUNTER
Rx denied, too early to refill, already given 6 month supply on 9/9/19 to same pharmacy requested.     Gill De Leon RN, BSN  [facility]

## 2019-10-29 DIAGNOSIS — I10 ESSENTIAL HYPERTENSION: ICD-10-CM

## 2019-10-29 DIAGNOSIS — I10 HTN, GOAL BELOW 140/90: ICD-10-CM

## 2019-10-30 ENCOUNTER — TELEPHONE (OUTPATIENT)
Dept: FAMILY MEDICINE | Facility: CLINIC | Age: 44
End: 2019-10-30

## 2019-10-30 RX ORDER — LABETALOL 100 MG/1
TABLET, FILM COATED ORAL
Qty: 180 TABLET | Refills: 0 | OUTPATIENT
Start: 2019-10-30

## 2019-10-30 NOTE — TELEPHONE ENCOUNTER
[facility] pharmacy calling re: labetalol, needs clarification. They need a verbal order. They did not receive anything in September. Please advise  Ph# [phone]  Thank you  An Le

## 2019-10-30 NOTE — TELEPHONE ENCOUNTER
"Requested Prescriptions   Pending Prescriptions Disp Refills     labetalol (NORMODYNE) 100 MG tablet [Pharmacy Med Name: LABETALOL 100MG TABLETS] 180 tablet 0     Sig: TAKE 1 TABLET(100 MG) BY MOUTH TWICE DAILY  Last Written Prescription Date:  9/9/19  Last Fill Quantity: 180,  # refills: 1   Last office visit: 10/14/2019 with prescribing provider:  Jose   Future Office Visit:           Beta-Blockers Protocol Passed - 10/29/2019  6:25 PM        Passed - Blood pressure under 140/90 in past 12 months     BP Readings from Last 3 Encounters:   10/14/19 108/68   10/06/19 119/84   10/02/19 110/70                 Passed - Patient is age 6 or older        Passed - Recent (12 mo) or future (30 days) visit within the authorizing provider's specialty     Patient has had an office visit with the authorizing provider or a provider within the authorizing providers department within the previous 12 mos or has a future within next 30 days. See \"Patient Info\" tab in inbasket, or \"Choose Columns\" in Meds & Orders section of the refill encounter.              Passed - Medication is active on med list          "

## 2019-10-30 NOTE — TELEPHONE ENCOUNTER
S/w [facility] pharmacist Casandra and gave verbal order for labetalol 100 mg from 9/9/19.  Casandra states the pharmacy did not receive the escribed order.    Charo RENE RN  EP Triage

## 2019-11-18 ENCOUNTER — TRANSFERRED RECORDS (OUTPATIENT)
Dept: HEALTH INFORMATION MANAGEMENT | Facility: CLINIC | Age: 44
End: 2019-11-18

## 2019-12-03 ENCOUNTER — TRANSFERRED RECORDS (OUTPATIENT)
Dept: HEALTH INFORMATION MANAGEMENT | Facility: CLINIC | Age: 44
End: 2019-12-03

## 2020-01-02 ENCOUNTER — TRANSFERRED RECORDS (OUTPATIENT)
Dept: HEALTH INFORMATION MANAGEMENT | Facility: CLINIC | Age: 45
End: 2020-01-02

## 2020-01-31 DIAGNOSIS — D47.2 MONOCLONAL PARAPROTEINEMIA: Primary | ICD-10-CM

## 2020-02-03 ENCOUNTER — ONCOLOGY VISIT (OUTPATIENT)
Dept: ONCOLOGY | Facility: CLINIC | Age: 45
End: 2020-02-03
Attending: INTERNAL MEDICINE
Payer: COMMERCIAL

## 2020-02-03 VITALS
BODY MASS INDEX: 27.82 KG/M2 | OXYGEN SATURATION: 97 % | DIASTOLIC BLOOD PRESSURE: 95 MMHG | HEART RATE: 79 BPM | RESPIRATION RATE: 16 BRPM | SYSTOLIC BLOOD PRESSURE: 143 MMHG | HEIGHT: 63 IN | TEMPERATURE: 98.2 F | WEIGHT: 157 LBS

## 2020-02-03 DIAGNOSIS — D47.2 MONOCLONAL PARAPROTEINEMIA: ICD-10-CM

## 2020-02-03 DIAGNOSIS — D47.2 MONOCLONAL PARAPROTEINEMIA: Primary | ICD-10-CM

## 2020-02-03 LAB
ALBUMIN SERPL-MCNC: 3.9 G/DL (ref 3.4–5)
ALP SERPL-CCNC: 62 U/L (ref 40–150)
ALT SERPL W P-5'-P-CCNC: 26 U/L (ref 0–50)
ANION GAP SERPL CALCULATED.3IONS-SCNC: 6 MMOL/L (ref 3–14)
AST SERPL W P-5'-P-CCNC: 20 U/L (ref 0–45)
BASOPHILS # BLD AUTO: 0 10E9/L (ref 0–0.2)
BASOPHILS NFR BLD AUTO: 0.2 %
BILIRUB SERPL-MCNC: 0.5 MG/DL (ref 0.2–1.3)
BUN SERPL-MCNC: 14 MG/DL (ref 7–30)
CALCIUM SERPL-MCNC: 8.7 MG/DL (ref 8.5–10.1)
CHLORIDE SERPL-SCNC: 106 MMOL/L (ref 94–109)
CO2 SERPL-SCNC: 26 MMOL/L (ref 20–32)
CREAT SERPL-MCNC: 0.98 MG/DL (ref 0.52–1.04)
DIFFERENTIAL METHOD BLD: NORMAL
EOSINOPHIL # BLD AUTO: 0.2 10E9/L (ref 0–0.7)
EOSINOPHIL NFR BLD AUTO: 2.3 %
ERYTHROCYTE [DISTWIDTH] IN BLOOD BY AUTOMATED COUNT: 12.1 % (ref 10–15)
GFR SERPL CREATININE-BSD FRML MDRD: 70 ML/MIN/{1.73_M2}
GLUCOSE SERPL-MCNC: 93 MG/DL (ref 70–99)
HCT VFR BLD AUTO: 42.4 % (ref 35–47)
HGB BLD-MCNC: 14.3 G/DL (ref 11.7–15.7)
IMM GRANULOCYTES # BLD: 0 10E9/L (ref 0–0.4)
IMM GRANULOCYTES NFR BLD: 0.2 %
LYMPHOCYTES # BLD AUTO: 2.1 10E9/L (ref 0.8–5.3)
LYMPHOCYTES NFR BLD AUTO: 24.3 %
MCH RBC QN AUTO: 30.4 PG (ref 26.5–33)
MCHC RBC AUTO-ENTMCNC: 33.7 G/DL (ref 31.5–36.5)
MCV RBC AUTO: 90 FL (ref 78–100)
MONOCYTES # BLD AUTO: 0.5 10E9/L (ref 0–1.3)
MONOCYTES NFR BLD AUTO: 6.3 %
NEUTROPHILS # BLD AUTO: 5.8 10E9/L (ref 1.6–8.3)
NEUTROPHILS NFR BLD AUTO: 66.7 %
NRBC # BLD AUTO: 0 10*3/UL
NRBC BLD AUTO-RTO: 0 /100
PLATELET # BLD AUTO: 247 10E9/L (ref 150–450)
POTASSIUM SERPL-SCNC: 3.7 MMOL/L (ref 3.4–5.3)
PROT SERPL-MCNC: 7.8 G/DL (ref 6.8–8.8)
RBC # BLD AUTO: 4.71 10E12/L (ref 3.8–5.2)
SODIUM SERPL-SCNC: 138 MMOL/L (ref 133–144)
WBC # BLD AUTO: 8.6 10E9/L (ref 4–11)

## 2020-02-03 PROCEDURE — 82784 ASSAY IGA/IGD/IGG/IGM EACH: CPT | Performed by: INTERNAL MEDICINE

## 2020-02-03 PROCEDURE — 83883 ASSAY NEPHELOMETRY NOT SPEC: CPT | Performed by: INTERNAL MEDICINE

## 2020-02-03 PROCEDURE — 82232 ASSAY OF BETA-2 PROTEIN: CPT | Performed by: INTERNAL MEDICINE

## 2020-02-03 PROCEDURE — 80053 COMPREHEN METABOLIC PANEL: CPT | Performed by: INTERNAL MEDICINE

## 2020-02-03 PROCEDURE — 00000402 ZZHCL STATISTIC TOTAL PROTEIN: Performed by: INTERNAL MEDICINE

## 2020-02-03 PROCEDURE — 85025 COMPLETE CBC W/AUTO DIFF WBC: CPT | Performed by: INTERNAL MEDICINE

## 2020-02-03 PROCEDURE — 84165 PROTEIN E-PHORESIS SERUM: CPT | Performed by: INTERNAL MEDICINE

## 2020-02-03 PROCEDURE — 86334 IMMUNOFIX E-PHORESIS SERUM: CPT | Performed by: INTERNAL MEDICINE

## 2020-02-03 PROCEDURE — G0463 HOSPITAL OUTPT CLINIC VISIT: HCPCS | Mod: ZF

## 2020-02-03 PROCEDURE — 99214 OFFICE O/P EST MOD 30 MIN: CPT | Mod: ZP | Performed by: INTERNAL MEDICINE

## 2020-02-03 PROCEDURE — 36415 COLL VENOUS BLD VENIPUNCTURE: CPT

## 2020-02-03 RX ORDER — LORATADINE 10 MG/1
10 TABLET ORAL DAILY
COMMUNITY
End: 2024-09-19

## 2020-02-03 ASSESSMENT — PAIN SCALES - GENERAL: PAINLEVEL: NO PAIN (0)

## 2020-02-03 ASSESSMENT — MIFFLIN-ST. JEOR: SCORE: 1331.15

## 2020-02-03 NOTE — NURSING NOTE
"Oncology Rooming Note    February 3, 2020 2:36 PM   Juany Blandon is a 44 year old female who presents for:    Chief Complaint   Patient presents with     Oncology Clinic Visit     Return visit; monoclonal paraproteinemia; vitals taken     Initial Vitals: BP (!) 143/95   Pulse 79   Temp 98.2  F (36.8  C) (Oral)   Resp 16   Ht 1.6 m (5' 2.99\")   Wt 71.2 kg (157 lb)   SpO2 97%   BMI 27.82 kg/m   Estimated body mass index is 27.82 kg/m  as calculated from the following:    Height as of this encounter: 1.6 m (5' 2.99\").    Weight as of this encounter: 71.2 kg (157 lb). Body surface area is 1.78 meters squared.  No Pain (0) Comment: Data Unavailable   No LMP recorded.  Allergies reviewed: Yes  Medications reviewed: Yes    Medications: Medication refills not needed today.  Pharmacy name entered into EPIC:    [id number] - [place], [place] - [address] [id number] - [place] - [address] [facility] [address] [facility] [address]  [id number] - [place], [place] - [address] AT [facility] [address] [place] [address] & [facility]    Clinical concerns: No new concerns today. Dr. Sorensen was notified.      MARY ARGUETA, HAYDEN            "

## 2020-02-03 NOTE — LETTER
2/3/2020       RE: Juany Blandon  [address]  [place] [address]     Dear Colleague,    Thank you for referring your patient, Juany Blandon, to the [facility]. Please see a copy of my visit note below.    Feb 3, 2020    HISTORY OF PRESENT ILLNESS:  Followup visit, as per the patient.  The patient was seen by me almost 2 years ago for a separate issue altogether.  She comes in today seeking my opinion on left-sided facial numbness and fullness that she has been experiencing since 09/2019.  So what she tells me is that she has had left-sided facial numbness since 09/2019.  She sought care with her primary care provider who thought she had sinusitis and gave her antibiotics.  She got a Zithromax prescription.  She continued to have headaches.  This led to an ED visit where she had extensive evaluation including an MRI, which was negative.  She ended up seeing ENT who did a CT scan as well as an ENT exam and they made a determination that she had chronic sinusitis.  Prescribed her cephalosporin oral and had her come back for a return visit for a repeat evaluation, at which time they have made a recommendation that she has chronic sinusitis with hypertrophy of the middle turbinate which needs intervention from the surgical end and it has been recommended for her.  On an aside, she has had ongoing concerns with neurological disorder that was deemed to be related to antiphospholipid antibody syndrome, although her repeat testing in our system was negative for lupus anticoagulant, so she went back to her neurologist who basically said that this is not related to any neurological disorder.  They sent her to Rheumatology where Rheumatology evaluated her.  Rheumatology did not think that this had anything to do with any rheumatological disorder.  Now the patient is back to see me because she thinks that this may be related to amyloidosis based on her Google search where patients  can have enlarged tongue which the patient states that when she is having flareup of her symptoms she feels like her tongue is enlarged but it is not all the time.  It is intermittent and what she describes is that she starts having these episodes where she has fullness of the left side of her face and it feels numb.  Then her nostrils feel full and her tongue starts thickening up and then she develops frontal headaches and then she feels like she has a pulsating artery on her forehead.  Other than that, she denies any fevers, chills, coughing, breathing trouble, abdominal pain, nausea or vomiting.  She has not had any stroke-like symptoms.  She has not had any clotting issues.  She has not had any pregnancies since the last visit with me almost 2 years ago.      PAST MEDICAL HISTORY:    Past Medical History:   Diagnosis Date     HX FIBROADENOMA L BREAST      JOINT PAIN-MULT JTS, W/U NEG ~      SPONTANEOUS  03          PAST SURGICAL HISTORY:    Past Surgical History:   Procedure Laterality Date     C NONSPECIFIC PROCEDURE      L BREAST BX, bening 2014       SECTION                 Social History     Socioeconomic History     Marital status:      Spouse name: CARISSA NOLEN     Number of children: 0     Years of education: Not on file     Highest education level: Not on file   Occupational History     Occupation: RESEARCH SPECIALIST     Comment: WORKS FOR [employer]   Social Needs     Financial resource strain: Not on file     Food insecurity:     Worry: Not on file     Inability: Not on file     Transportation needs:     Medical: Not on file     Non-medical: Not on file   Tobacco Use     Smoking status: Former Smoker     Packs/day: 0.50     Years: 8.00     Pack years: 4.00     Types: Cigarettes     Start date:      Last attempt to quit: 2010     Years since quittin.9     Smokeless tobacco: Never Used   Substance and Sexual Activity     Alcohol use: No     Alcohol/week: 0.0  standard drinks     Drug use: No     Sexual activity: Yes     Partners: Male   Lifestyle     Physical activity:     Days per week: Not on file     Minutes per session: Not on file     Stress: Not on file   Relationships     Social connections:     Talks on phone: Not on file     Gets together: Not on file     Attends [employer] service: Not on file     Active member of club or organization: Not on file     Attends meetings of clubs or organizations: Not on file     Relationship status: Not on file     Intimate partner violence:     Fear of current or ex partner: Not on file     Emotionally abused: Not on file     Physically abused: Not on file     Forced sexual activity: Not on file   Other Topics Concern      Service No     Blood Transfusions No     Caffeine Concern No     Occupational Exposure No     Hobby Hazards No     Sleep Concern Yes     Stress Concern No     Weight Concern No     Special Diet No     Back Care Yes     Comment: upper back pain     Exercise No     Bike Helmet No     Comment: DOES NOT BIKE OUTSIDE     Seat Belt Yes     Self-Exams Yes     Parent/sibling w/ CABG, MI or angioplasty before 65F 55M? Not Asked   Social History Narrative    , one child, non smoker, no alcohol , working full time      Family History   Problem Relation Age of Onset     Blood Disease Father         non higd lymphoma     Gynecology Mother         fibroids     Lipids Mother      Hypertension Mother      Diabetes Maternal Grandmother      Arthritis Paternal Grandmother      Cerebrovascular Disease Maternal Grandfather          of stroke at age 50+     C.A.D. No family hx of      Breast Cancer No family hx of      Cancer - colorectal No family hx of      Current Outpatient Medications   Medication Sig Dispense Refill     amLODIPine (NORVASC) 10 MG tablet Take 1 tablet (10 mg) by mouth daily 90 tablet 1     aspirin 81 MG tablet Take 81 mg by mouth daily       fluticasone (FLONASE) 50 MCG/ACT nasal spray Spray  "1-2 sprays into both nostrils daily 16 g prn     labetalol (NORMODYNE) 100 MG tablet TAKE 1 TABLET(100 MG) BY MOUTH TWICE DAILY 180 tablet 1     loratadine (CLARITIN) 10 MG tablet Take 10 mg by mouth daily       Calcium-Magnesium-Vitamin D (CALCIUM 500 PO)        Cholecalciferol (VITAMIN D PO)        naproxen (NAPROSYN) 500 MG tablet Take 1 tablet (500 mg) by mouth 2 times daily (with meals) (Patient not taking: Reported on 2/3/2020) 30 tablet 0     VITAMIN E PO           Allergies   Allergen Reactions     No Known Allergies           PHYSICAL EXAMINATION:   VITAL SIGNS:  BP (!) 143/95   Pulse 79   Temp 98.2  F (36.8  C) (Oral)   Resp 16   Ht 1.6 m (5' 2.99\")   Wt 71.2 kg (157 lb)   SpO2 97%   BMI 27.82 kg/m       GENERAL:  Alert and oriented x3, no apparent distress.   HEENT:  Moist mucous membrane.  She has some tenderness in the maxillary sinus area and I am not sure if it is indeed tenderness or patient hyperactivity.   EXTREMITIES:  No lower extremity swelling.   NEUROLOGIC:  Moving all extremities appropriately.  Grossly neurologically intact.   PSYCHIATRIC:  Appropriate affect.      LABORATORY:  No pertinent labs - will be getting the labs today.      IMAGING:  I reviewed all the imaging that she has had done that I have access for reports between the ENT specialist, the neurology specialist and the ED visit.      ASSESSMENT/PLAN:  Patient with history of paraproteinemia in the past in the background of antiphospholipid antibody.  Lupus anticoagulant positivity on 1 occasion but negative lupus anticoagulant on repeat testing who is here to explore if she has amyloidosis.  I had a very, very lengthy conversation with the patient explaining the spectrum of plasma cell dyscrasias, going deeper dive into amyloidosis, pathophysiology, organ dysfunction, diagnoses and treatment.  I also tried to reassure her that I do not think that any of her symptoms seem to be related to her potential plasma cell " dyscrasia amyloidosis.  If it was causing thickening of the tongue, that would be an ongoing finding and not what she is describing.  I highly encouraged her to go ahead with the ENT surgery and have a discussion with ENT surgeons regarding the success rates of the suggested surgery.  Since she has major concerns regarding this plasma cell dyscrasia and her labs were not completely normal at the time of last evaluation, I would go ahead and order repeat testing.  If this requires further diagnostic evaluation, we will pursue testing in concert with my colleagues.   I spent 25 minutes in the care of this patient >50% of which was spent in coordinating and counseling.    Maite Sorensen   of Medicine   Hematology and medical Oncology   [facility]

## 2020-02-03 NOTE — NURSING NOTE
Chief Complaint   Patient presents with     Blood Draw     Labs drawn via  by RN in lab. VS taken at provider visit prior to lab appt.     Vivian Lujan RN

## 2020-02-03 NOTE — PROGRESS NOTES
Feb 3, 2020    HISTORY OF PRESENT ILLNESS:  Followup visit, as per the patient.  The patient was seen by me almost 2 years ago for a separate issue altogether.  She comes in today seeking my opinion on left-sided facial numbness and fullness that she has been experiencing since 09/2019.  So what she tells me is that she has had left-sided facial numbness since 09/2019.  She sought care with her primary care provider who thought she had sinusitis and gave her antibiotics.  She got a Zithromax prescription.  She continued to have headaches.  This led to an ED visit where she had extensive evaluation including an MRI, which was negative.  She ended up seeing ENT who did a CT scan as well as an ENT exam and they made a determination that she had chronic sinusitis.  Prescribed her cephalosporin oral and had her come back for a return visit for a repeat evaluation, at which time they have made a recommendation that she has chronic sinusitis with hypertrophy of the middle turbinate which needs intervention from the surgical end and it has been recommended for her.  On an aside, she has had ongoing concerns with neurological disorder that was deemed to be related to antiphospholipid antibody syndrome, although her repeat testing in our system was negative for lupus anticoagulant, so she went back to her neurologist who basically said that this is not related to any neurological disorder.  They sent her to Rheumatology where Rheumatology evaluated her.  Rheumatology did not think that this had anything to do with any rheumatological disorder.  Now the patient is back to see me because she thinks that this may be related to amyloidosis based on her Google search where patients can have enlarged tongue which the patient states that when she is having flareup of her symptoms she feels like her tongue is enlarged but it is not all the time.  It is intermittent and what she describes is that she starts having these episodes where  she has fullness of the left side of her face and it feels numb.  Then her nostrils feel full and her tongue starts thickening up and then she develops frontal headaches and then she feels like she has a pulsating artery on her forehead.  Other than that, she denies any fevers, chills, coughing, breathing trouble, abdominal pain, nausea or vomiting.  She has not had any stroke-like symptoms.  She has not had any clotting issues.  She has not had any pregnancies since the last visit with me almost 2 years ago.      PAST MEDICAL HISTORY:    Past Medical History:   Diagnosis Date     HX FIBROADENOMA L BREAST      JOINT PAIN-MULT JTS, W/U NEG ~      SPONTANEOUS  03          PAST SURGICAL HISTORY:    Past Surgical History:   Procedure Laterality Date     C NONSPECIFIC PROCEDURE      L BREAST BX, bening 2014       SECTION                 Social History     Socioeconomic History     Marital status:      Spouse name: CARISSA NOLEN     Number of children: 0     Years of education: Not on file     Highest education level: Not on file   Occupational History     Occupation: RESEARCH SPECIALIST     Comment: WORKS FOR [employer]   Social Needs     Financial resource strain: Not on file     Food insecurity:     Worry: Not on file     Inability: Not on file     Transportation needs:     Medical: Not on file     Non-medical: Not on file   Tobacco Use     Smoking status: Former Smoker     Packs/day: 0.50     Years: 8.00     Pack years: 4.00     Types: Cigarettes     Start date:      Last attempt to quit: 2010     Years since quittin.9     Smokeless tobacco: Never Used   Substance and Sexual Activity     Alcohol use: No     Alcohol/week: 0.0 standard drinks     Drug use: No     Sexual activity: Yes     Partners: Male   Lifestyle     Physical activity:     Days per week: Not on file     Minutes per session: Not on file     Stress: Not on file   Relationships     Social connections:     Talks on  phone: Not on file     Gets together: Not on file     Attends [employer] service: Not on file     Active member of club or organization: Not on file     Attends meetings of clubs or organizations: Not on file     Relationship status: Not on file     Intimate partner violence:     Fear of current or ex partner: Not on file     Emotionally abused: Not on file     Physically abused: Not on file     Forced sexual activity: Not on file   Other Topics Concern      Service No     Blood Transfusions No     Caffeine Concern No     Occupational Exposure No     Hobby Hazards No     Sleep Concern Yes     Stress Concern No     Weight Concern No     Special Diet No     Back Care Yes     Comment: upper back pain     Exercise No     Bike Helmet No     Comment: DOES NOT BIKE OUTSIDE     Seat Belt Yes     Self-Exams Yes     Parent/sibling w/ CABG, MI or angioplasty before 65F 55M? Not Asked   Social History Narrative    , one child, non smoker, no alcohol , working full time      Family History   Problem Relation Age of Onset     Blood Disease Father         non higd lymphoma     Gynecology Mother         fibroids     Lipids Mother      Hypertension Mother      Diabetes Maternal Grandmother      Arthritis Paternal Grandmother      Cerebrovascular Disease Maternal Grandfather          of stroke at age 50+     C.A.D. No family hx of      Breast Cancer No family hx of      Cancer - colorectal No family hx of      Current Outpatient Medications   Medication Sig Dispense Refill     amLODIPine (NORVASC) 10 MG tablet Take 1 tablet (10 mg) by mouth daily 90 tablet 1     aspirin 81 MG tablet Take 81 mg by mouth daily       fluticasone (FLONASE) 50 MCG/ACT nasal spray Spray 1-2 sprays into both nostrils daily 16 g prn     labetalol (NORMODYNE) 100 MG tablet TAKE 1 TABLET(100 MG) BY MOUTH TWICE DAILY 180 tablet 1     loratadine (CLARITIN) 10 MG tablet Take 10 mg by mouth daily       Calcium-Magnesium-Vitamin D (CALCIUM 500 PO)  "       Cholecalciferol (VITAMIN D PO)        naproxen (NAPROSYN) 500 MG tablet Take 1 tablet (500 mg) by mouth 2 times daily (with meals) (Patient not taking: Reported on 2/3/2020) 30 tablet 0     VITAMIN E PO           Allergies   Allergen Reactions     No Known Allergies           PHYSICAL EXAMINATION:   VITAL SIGNS:  BP (!) 143/95   Pulse 79   Temp 98.2  F (36.8  C) (Oral)   Resp 16   Ht 1.6 m (5' 2.99\")   Wt 71.2 kg (157 lb)   SpO2 97%   BMI 27.82 kg/m      GENERAL:  Alert and oriented x3, no apparent distress.   HEENT:  Moist mucous membrane.  She has some tenderness in the maxillary sinus area and I am not sure if it is indeed tenderness or patient hyperactivity.   EXTREMITIES:  No lower extremity swelling.   NEUROLOGIC:  Moving all extremities appropriately.  Grossly neurologically intact.   PSYCHIATRIC:  Appropriate affect.      LABORATORY:  No pertinent labs - will be getting the labs today.      IMAGING:  I reviewed all the imaging that she has had done that I have access for reports between the ENT specialist, the neurology specialist and the ED visit.      ASSESSMENT/PLAN:  Patient with history of paraproteinemia in the past in the background of antiphospholipid antibody.  Lupus anticoagulant positivity on 1 occasion but negative lupus anticoagulant on repeat testing who is here to explore if she has amyloidosis.  I had a very, very lengthy conversation with the patient explaining the spectrum of plasma cell dyscrasias, going deeper dive into amyloidosis, pathophysiology, organ dysfunction, diagnoses and treatment.  I also tried to reassure her that I do not think that any of her symptoms seem to be related to her potential plasma cell dyscrasia amyloidosis.  If it was causing thickening of the tongue, that would be an ongoing finding and not what she is describing.  I highly encouraged her to go ahead with the ENT surgery and have a discussion with ENT surgeons regarding the success rates of the " suggested surgery.  Since she has major concerns regarding this plasma cell dyscrasia and her labs were not completely normal at the time of last evaluation, I would go ahead and order repeat testing.  If this requires further diagnostic evaluation, we will pursue testing in concert with my colleagues.   I spent 25 minutes in the care of this patient >50% of which was spent in coordinating and counseling.    Maite Sorensen   of Medicine   Hematology and medical Oncology   [facility]

## 2020-02-04 LAB
ALBUMIN SERPL ELPH-MCNC: 4.6 G/DL (ref 3.7–5.1)
ALPHA1 GLOB SERPL ELPH-MCNC: 0.2 G/DL (ref 0.2–0.4)
ALPHA2 GLOB SERPL ELPH-MCNC: 0.6 G/DL (ref 0.5–0.9)
B-GLOBULIN SERPL ELPH-MCNC: 0.8 G/DL (ref 0.6–1)
B2 MICROGLOB SERPL-MCNC: 1.4 MG/L
GAMMA GLOB SERPL ELPH-MCNC: 1.3 G/DL (ref 0.7–1.6)
IGA SERPL-MCNC: 195 MG/DL (ref 84–499)
IGG SERPL-MCNC: 1294 MG/DL (ref 610–1616)
IGM SERPL-MCNC: 135 MG/DL (ref 35–242)
KAPPA LC UR-MCNC: 1.97 MG/DL (ref 0.33–1.94)
KAPPA LC/LAMBDA SER: 1.01 {RATIO} (ref 0.26–1.65)
LAMBDA LC SERPL-MCNC: 1.95 MG/DL (ref 0.57–2.63)
M PROTEIN SERPL ELPH-MCNC: 0 G/DL
PROT PATTERN SERPL ELPH-IMP: NORMAL
PROT PATTERN SERPL IFE-IMP: NORMAL

## 2020-02-10 ENCOUNTER — TELEPHONE (OUTPATIENT)
Dept: ONCOLOGY | Facility: CLINIC | Age: 45
End: 2020-02-10

## 2020-02-10 NOTE — TELEPHONE ENCOUNTER
Writer placed call to patient to discuss lab results as outlined by Dr Sorensen. Patient voiced understanding of plan to follow up with labs and clinic visit with Dr Sorensen in 3 months. Results released to [facility] for patient review.

## 2020-02-10 NOTE — TELEPHONE ENCOUNTER
----- Message from Maite Sorensen MD sent at 2/10/2020  3:26 PM CST -----  All the labs that have resulted thus far look acceptable, No concerns for monoclonal process.   Maite Sorensen   of Medicine   Hematology and medical Oncology   [facility]

## 2020-02-10 NOTE — RESULT ENCOUNTER NOTE
All the labs that have resulted thus far look acceptable, No concerns for monoclonal process.   Maite Sorensen   of Medicine   Hematology and medical Oncology   [facility]

## 2020-04-04 DIAGNOSIS — I10 ESSENTIAL HYPERTENSION WITH GOAL BLOOD PRESSURE LESS THAN 140/90: ICD-10-CM

## 2020-04-06 NOTE — TELEPHONE ENCOUNTER
"Failed bp    Requested Prescriptions   Pending Prescriptions Disp Refills     amLODIPine (NORVASC) 10 MG tablet [Pharmacy Med Name: AMLODIPINE BESYLATE 10MG TABLETS] 90 tablet 1     Sig: TAKE 1 TABLET(10 MG) BY MOUTH DAILY       Calcium Channel Blockers Protocol  Failed - 4/4/2020  5:17 PM        Failed - Blood pressure under 140/90 in past 12 months     BP Readings from Last 3 Encounters:   02/03/20 (!) 143/95   10/14/19 108/68   10/06/19 119/84                 Passed - Recent (12 mo) or future (30 days) visit within the authorizing provider's specialty     Patient has had an office visit with the authorizing provider or a provider within the authorizing providers department within the previous 12 mos or has a future within next 30 days. See \"Patient Info\" tab in inbasket, or \"Choose Columns\" in Meds & Orders section of the refill encounter.              Passed - Medication is active on med list        Passed - Patient is age 18 or older        Passed - No active pregnancy on record        Passed - Normal serum creatinine on file in past 12 months     Recent Labs   Lab Test 02/03/20  1600   CR 0.98       Ok to refill medication if creatinine is low          Passed - No positive pregnancy test in past 12 months             "

## 2020-04-07 RX ORDER — AMLODIPINE BESYLATE 10 MG/1
TABLET ORAL
Qty: 30 TABLET | Refills: 1 | Status: SHIPPED | OUTPATIENT
Start: 2020-04-07 | End: 2020-04-24

## 2020-04-07 NOTE — TELEPHONE ENCOUNTER
Left message for pt to call back: Remind pt to do follow up for med check and do  e visit , since she is due.   Taylor Colón,

## 2020-04-07 NOTE — TELEPHONE ENCOUNTER
Script refill faxed. Remind pt to do follow up for med check and do  e visit , since she is due.

## 2020-04-07 NOTE — TELEPHONE ENCOUNTER
Routing to team to inform and assist in scheduling.   Sara Goddard RN   [facility] - Triage

## 2020-04-24 ENCOUNTER — VIRTUAL VISIT (OUTPATIENT)
Dept: FAMILY MEDICINE | Facility: CLINIC | Age: 45
End: 2020-04-24
Payer: COMMERCIAL

## 2020-04-24 DIAGNOSIS — I10 ESSENTIAL HYPERTENSION WITH GOAL BLOOD PRESSURE LESS THAN 140/90: Primary | ICD-10-CM

## 2020-04-24 DIAGNOSIS — E78.5 HYPERLIPIDEMIA LDL GOAL <130: ICD-10-CM

## 2020-04-24 PROCEDURE — 99214 OFFICE O/P EST MOD 30 MIN: CPT | Mod: 95 | Performed by: FAMILY MEDICINE

## 2020-04-24 RX ORDER — AMLODIPINE BESYLATE 10 MG/1
10 TABLET ORAL DAILY
Qty: 90 TABLET | Refills: 1 | Status: SHIPPED | OUTPATIENT
Start: 2020-04-24 | End: 2020-11-12

## 2020-04-24 NOTE — PROGRESS NOTES
"Juany Blandon is a 44 year old female who is being evaluated via a billable telephone visit.      The patient has been notified of following:     \"This telephone visit will be conducted via a call between you and your physician/provider. We have found that certain health care needs can be provided without the need for a physical exam.  This service lets us provide the care you need with a short phone conversation.  If a prescription is necessary we can send it directly to your pharmacy.  If lab work is needed we can place an order for that and you can then stop by our lab to have the test done at a later time.    Telephone visits are billed at different rates depending on your insurance coverage. During this emergency period, for some insurers they may be billed the same as an in-person visit.  Please reach out to your insurance provider with any questions.    If during the course of the call the physician/provider feels a telephone visit is not appropriate, you will not be charged for this service.\"    Patient has given verbal consent for Telephone visit?  Yes    How would you like to obtain your AVS? MyChart    Subjective     Juany Blandon is a 44 year old female who presents to clinic today for the following health issues:    HPI  Medication Followup of Amlodipine     Taking Medication as prescribed: yes    Side Effects:  None    Medication Helping Symptoms:  yes     Hypertension Follow-up      Do you check your blood pressure regularly outside of the clinic? Not lately, because she feels well so no concern.  but has home monitor, and she plans to check and watch closely.    Are you following a low salt diet? Yes    Are your blood pressures ever more than 140 on the top number (systolic) OR more   than 90 on the bottom number (diastolic), for example 140/90? Not usually, although during last office visit BP was elevated per chart review.       PROBLEMS TO ADD ON...    Hyperlipidemia Follow-Up      Are " you regularly taking any medication or supplement to lower your cholesterol?   No     She does try to watch her diet, and usually has exercise routine.  Not exercising as much recently because of the COVID and has been working from home.     Are you having muscle aches or other side effects that you think could be caused by your cholesterol lowering medication?  N/A          Reviewed and updated as needed this visit by Provider         Review of Systems   ROS COMP: Constitutional, HEENT, cardiovascular, pulmonary, GI, , musculoskeletal, neuro, skin, endocrine and psych systems are negative, except as otherwise noted.           Assessment/Plan:    (I10) Essential hypertension with goal blood pressure less than 140/90  (primary encounter diagnosis)  Comment:   Plan: amLODIPine (NORVASC) 10 MG tablet, Lipid panel         reflex to direct LDL Fasting        BP was not  in adequate control during last visit although patient think that she is doing okay and she plans to monitor it more closely with a home monitor.  She can also do nurse only visit for BP check if it is elevated or concerning.. Per chart review her other labs are current. . Discussed cares, low fat low salt diet etc. Refill given. encouraged home BP monitoring. Follow up recheck in 3-4 months, sooner if problem.       (E78.5) Hyperlipidemia LDL goal <130  Comment:   Plan:  Lipid panel         reflex to direct LDL Fasting    Lipid has been mildly elevated in the past. Need to watch diet( low fat, low cholesterol, high fiber diet) exercise. May also take omega-3 fatty acid( fish oil or flex seed oil capsule OTC) to help improve lipid.  She will schedule for fasting lab in the near future . follow up here as needed      Prescription sent.Cares and  treatment discussed. follow up if problem   Patient expressed understanding and agreement with treatment plan. All patient's questions were answered, will let me know if has more later.  Medications: Rx's:  Reviewed the potential side effects/complications of medications prescribed.           Phone call duration: 9 minutes    Korina Garcia MD

## 2020-05-03 DIAGNOSIS — I10 ESSENTIAL HYPERTENSION: Primary | ICD-10-CM

## 2020-05-04 RX ORDER — LABETALOL 100 MG/1
TABLET, FILM COATED ORAL
Qty: 180 TABLET | Refills: 0 | Status: SHIPPED | OUTPATIENT
Start: 2020-05-04 | End: 2020-08-05

## 2020-05-04 NOTE — TELEPHONE ENCOUNTER
Routing refill request to provider for review/approval because:  Labs out of range:  BP  Medication not active on patients list.     Gill De Leon RN, BSN  [facility] [place]

## 2020-07-17 DIAGNOSIS — I10 ESSENTIAL HYPERTENSION WITH GOAL BLOOD PRESSURE LESS THAN 140/90: ICD-10-CM

## 2020-07-17 PROCEDURE — 80061 LIPID PANEL: CPT | Performed by: FAMILY MEDICINE

## 2020-07-17 PROCEDURE — 36415 COLL VENOUS BLD VENIPUNCTURE: CPT | Performed by: FAMILY MEDICINE

## 2020-07-18 LAB
CHOLEST SERPL-MCNC: 227 MG/DL
HDLC SERPL-MCNC: 42 MG/DL
LDLC SERPL CALC-MCNC: 163 MG/DL
NONHDLC SERPL-MCNC: 185 MG/DL
TRIGL SERPL-MCNC: 110 MG/DL

## 2020-07-20 ENCOUNTER — VIRTUAL VISIT (OUTPATIENT)
Dept: ONCOLOGY | Facility: CLINIC | Age: 45
End: 2020-07-20
Attending: INTERNAL MEDICINE
Payer: COMMERCIAL

## 2020-07-20 DIAGNOSIS — D47.2 MONOCLONAL PARAPROTEINEMIA: ICD-10-CM

## 2020-07-20 PROCEDURE — 40001009 ZZH VIDEO/TELEPHONE VISIT; NO CHARGE

## 2020-07-20 PROCEDURE — 99207 ZZC NO DOCUMENTATION ON VISIT: CPT | Mod: GT | Performed by: INTERNAL MEDICINE

## 2020-07-20 NOTE — LETTER
"7/20/2020      RE: Juany Blandon  [address]  [place] [address]       Juany Blandon is a 44 year old female who is being evaluated via a billable video visit.      The patient has been notified of following:     \"This video visit will be conducted via a call between you and your physician/provider. We have found that certain health care needs can be provided without the need for an in-person physical exam.  This service lets us provide the care you need with a video conversation.  If a prescription is necessary we can send it directly to your pharmacy.  If lab work is needed we can place an order for that and you can then stop by our lab to have the test done at a later time.    Video visits are billed at different rates depending on your insurance coverage.  Please reach out to your insurance provider with any questions.    If during the course of the call the physician/provider feels a video visit is not appropriate, you will not be charged for this service.\"    Patient has given verbal consent for Video visit? Yes    How would you like to obtain your AVS? Jono Castro LPN    Video-Visit Details    Type of service:  Video Visit    Video Start Time: 2:43 PM  Video End Time: 2:53 PM    Originating Location (pt. Location): Home    Distant Location (provider location): Provider home     Platform used for Video Visit: MD Maite Moore MD    "

## 2020-07-20 NOTE — PROGRESS NOTES
"Juany Blandon is a 44 year old female who is being evaluated via a billable video visit.      The patient has been notified of following:     \"This video visit will be conducted via a call between you and your physician/provider. We have found that certain health care needs can be provided without the need for an in-person physical exam.  This service lets us provide the care you need with a video conversation.  If a prescription is necessary we can send it directly to your pharmacy.  If lab work is needed we can place an order for that and you can then stop by our lab to have the test done at a later time.    Video visits are billed at different rates depending on your insurance coverage.  Please reach out to your insurance provider with any questions.    If during the course of the call the physician/provider feels a video visit is not appropriate, you will not be charged for this service.\"    Patient has given verbal consent for Video visit? Yes    How would you like to obtain your AVS? Jono Castro LPN    Video-Visit Details    Type of service:  Video Visit    Video Start Time: 2:43 PM  Video End Time: 2:53 PM    Originating Location (pt. Location): Home    Distant Location (provider location): Provider home     Platform used for Video Visit: Gary Sorensen MD        "

## 2020-07-20 NOTE — LETTER
"    7/20/2020         RE: Juany Blandon  [address]  [place] [address]        Dear Colleague,    Thank you for referring your patient, Juany Blandon, to the [facility]. Please see a copy of my visit note below.    Juany Blandon is a 44 year old female who is being evaluated via a billable video visit.      The patient has been notified of following:     \"This video visit will be conducted via a call between you and your physician/provider. We have found that certain health care needs can be provided without the need for an in-person physical exam.  This service lets us provide the care you need with a video conversation.  If a prescription is necessary we can send it directly to your pharmacy.  If lab work is needed we can place an order for that and you can then stop by our lab to have the test done at a later time.    Video visits are billed at different rates depending on your insurance coverage.  Please reach out to your insurance provider with any questions.    If during the course of the call the physician/provider feels a video visit is not appropriate, you will not be charged for this service.\"    Patient has given verbal consent for Video visit? Yes    How would you like to obtain your AVS? Jono Castro LPN    Video-Visit Details    Type of service:  Video Visit    Video Start Time: {video visit start/end time:152948}  Video End Time: {video visit start/end time:152948}    Originating Location (pt. Location): {video visit patient location:611987::\"Home\"}    Distant Location (provider location):  [facility]     Platform used for Video Visit: {Virtual Visit Platforms:556764::\"SiteJabber\"}    {signature options:865179}          Again, thank you for allowing me to participate in the care of your patient.        Sincerely,        Maite Sorensen MD    "

## 2020-08-05 DIAGNOSIS — I10 ESSENTIAL HYPERTENSION: ICD-10-CM

## 2020-08-05 RX ORDER — LABETALOL 100 MG/1
TABLET, FILM COATED ORAL
Qty: 180 TABLET | Refills: 0 | Status: SHIPPED | OUTPATIENT
Start: 2020-08-05 | End: 2020-11-02

## 2020-08-05 NOTE — TELEPHONE ENCOUNTER
Script refill faxed. Remind pt to do follow up for bp  check nurse only to document her BP control

## 2020-08-05 NOTE — TELEPHONE ENCOUNTER
Routing refill request to provider for review/approval because:  Labs out of range:  BP    Gill De Leon RN, BSN  [facility] [place]

## 2020-08-05 NOTE — TELEPHONE ENCOUNTER
Routing to team to inform and assist with scheduling. Thank you very much.     Gill De Leon RN, BSN  [facility]

## 2020-08-18 NOTE — TELEPHONE ENCOUNTER
Pt has not read either of her Plivo messages, Left message on voicemail for patient to call back. Ethan FISHMAN, CMA

## 2020-11-01 DIAGNOSIS — I10 ESSENTIAL HYPERTENSION: ICD-10-CM

## 2020-11-02 RX ORDER — LABETALOL 100 MG/1
TABLET, FILM COATED ORAL
Qty: 60 TABLET | Refills: 0 | Status: SHIPPED | OUTPATIENT
Start: 2020-11-02 | End: 2020-11-12

## 2020-11-12 ENCOUNTER — OFFICE VISIT (OUTPATIENT)
Dept: FAMILY MEDICINE | Facility: CLINIC | Age: 45
End: 2020-11-12
Payer: COMMERCIAL

## 2020-11-12 VITALS
TEMPERATURE: 97.7 F | HEART RATE: 71 BPM | SYSTOLIC BLOOD PRESSURE: 98 MMHG | OXYGEN SATURATION: 97 % | HEIGHT: 63 IN | WEIGHT: 161 LBS | DIASTOLIC BLOOD PRESSURE: 58 MMHG | BODY MASS INDEX: 28.53 KG/M2

## 2020-11-12 DIAGNOSIS — Z23 NEED FOR PROPHYLACTIC VACCINATION AND INOCULATION AGAINST INFLUENZA: Primary | ICD-10-CM

## 2020-11-12 DIAGNOSIS — J30.89 SEASONAL ALLERGIC RHINITIS DUE TO OTHER ALLERGIC TRIGGER: ICD-10-CM

## 2020-11-12 DIAGNOSIS — I10 ESSENTIAL HYPERTENSION: ICD-10-CM

## 2020-11-12 DIAGNOSIS — R09.81 NASAL CONGESTION: ICD-10-CM

## 2020-11-12 DIAGNOSIS — I10 ESSENTIAL HYPERTENSION WITH GOAL BLOOD PRESSURE LESS THAN 140/90: ICD-10-CM

## 2020-11-12 PROCEDURE — 90471 IMMUNIZATION ADMIN: CPT | Performed by: FAMILY MEDICINE

## 2020-11-12 PROCEDURE — 80053 COMPREHEN METABOLIC PANEL: CPT | Performed by: FAMILY MEDICINE

## 2020-11-12 PROCEDURE — 90686 IIV4 VACC NO PRSV 0.5 ML IM: CPT | Performed by: FAMILY MEDICINE

## 2020-11-12 PROCEDURE — 36415 COLL VENOUS BLD VENIPUNCTURE: CPT | Performed by: FAMILY MEDICINE

## 2020-11-12 PROCEDURE — 99214 OFFICE O/P EST MOD 30 MIN: CPT | Mod: 25 | Performed by: FAMILY MEDICINE

## 2020-11-12 RX ORDER — LABETALOL 100 MG/1
100 TABLET, FILM COATED ORAL 2 TIMES DAILY
Qty: 180 TABLET | Refills: 1 | Status: SHIPPED | OUTPATIENT
Start: 2020-11-12 | End: 2021-06-07

## 2020-11-12 RX ORDER — FLUTICASONE PROPIONATE 50 MCG
1-2 SPRAY, SUSPENSION (ML) NASAL DAILY
Qty: 16 G | Status: SHIPPED | OUTPATIENT
Start: 2020-11-12 | End: 2022-08-16

## 2020-11-12 RX ORDER — AMLODIPINE BESYLATE 10 MG/1
10 TABLET ORAL DAILY
Qty: 90 TABLET | Refills: 1 | Status: SHIPPED | OUTPATIENT
Start: 2020-11-12 | End: 2021-06-07

## 2020-11-12 ASSESSMENT — MIFFLIN-ST. JEOR: SCORE: 1349.42

## 2020-11-12 NOTE — PROGRESS NOTES
Subjective     Juany Blandon is a 44 year old female who presents to clinic today for the following health issues:    HPI         Hypertension Follow-up - amloipine / labetalol        Do you check your blood pressure regularly outside of the clinic? Yes     Are you following a low salt diet? Yes    Are your blood pressures ever more than 140 on the top number (systolic) OR more   than 90 on the bottom number (diastolic), for example 140/90? No - 136/80      How many servings of fruits and vegetables do you eat daily?  4 or more    On average, how many sweetened beverages do you drink each day (Examples: soda, juice, sweet tea, etc.  Do NOT count diet or artificially sweetened beverages)?   0    How many days per week do you exercise enough to make your heart beat faster? 0    How many minutes a day do you exercise enough to make your heart beat faster? 0    How many days per week do you miss taking your medication? 0         Review of Systems   Constitutional, HEENT, cardiovascular, pulmonary, GI, , musculoskeletal, neuro, skin, endocrine and psych systems are negative, except as otherwise noted.      Objective    There were no vitals taken for this visit.  There is no height or weight on file to calculate BMI.  Physical Exam   GENERAL: healthy, alert and no distress  HENT: oropharynx clear and oral mucous membranes moist  NECK: no adenopathy  RESP: lungs clear to auscultation - no rales, rhonchi or wheezes  CV: regular rate and rhythm, normal S1 S2, no S3 or S4,   ABDOMEN: soft, nontender, no hepatosplenomegaly, no masses and bowel sounds normal  MS: no edema          Assessment & Plan         (I10) Essential hypertension  Comment:   Plan: labetalol (NORMODYNE) 100 MG tablet, amLODIPine (NORVASC) 10 MG tablet,         Comprehensive metabolic panel  BP in adequate control. Discussed cares, low fat low salt  diet etc. Check labs, call pt with results. Refill given. encouraged home BP monitoring.   Follow up  "recheck in 6 months, sooner if problem.     (R09.81) Nasal congestion  Comment: stable   Plan: fluticasone (FLONASE) 50 MCG/ACT nasal spray          (J30.89) Seasonal allergic rhinitis due to other allergic trigger  Comment: doing ok. Need refill   Plan: fluticasone (FLONASE) 50 MCG/ACT nasal spray         (Z23) Need for prophylactic vaccination and inoculation against influenza  (primary encounter diagnosis)  Comment:   Plan: INFLUENZA VACCINE IM > 6 MONTHS VALENT IIV4         [30110], ADMIN INFLUENZA (For MEDICARE Patients        ONLY) []             Check labs. refill sent.Cares and  treatment discussed. follow up if problem   Patient expressed understanding and agreement with treatment plan. All patient's questions were answered, will let me know if has more later.  Medications: Rx's: Reviewed the potential side effects/complications of medications prescribed.     BMI:   Estimated body mass index is 28.52 kg/m  as calculated from the following:    Height as of this encounter: 1.6 m (5' 3\").    Weight as of this encounter: 73 kg (161 lb).   Weight management plan: Discussed healthy diet and exercise guidelines           Return in about 4 weeks (around 12/10/2020) for Physical Exam.    Korina Garica MD  [facility]    "

## 2020-11-13 LAB
ALBUMIN SERPL-MCNC: 3.8 G/DL (ref 3.4–5)
ALP SERPL-CCNC: 69 U/L (ref 40–150)
ALT SERPL W P-5'-P-CCNC: 24 U/L (ref 0–50)
ANION GAP SERPL CALCULATED.3IONS-SCNC: 2 MMOL/L (ref 3–14)
AST SERPL W P-5'-P-CCNC: 21 U/L (ref 0–45)
BILIRUB SERPL-MCNC: 0.8 MG/DL (ref 0.2–1.3)
BUN SERPL-MCNC: 11 MG/DL (ref 7–30)
CALCIUM SERPL-MCNC: 9.2 MG/DL (ref 8.5–10.1)
CHLORIDE SERPL-SCNC: 103 MMOL/L (ref 94–109)
CO2 SERPL-SCNC: 32 MMOL/L (ref 20–32)
CREAT SERPL-MCNC: 0.83 MG/DL (ref 0.52–1.04)
GFR SERPL CREATININE-BSD FRML MDRD: 86 ML/MIN/{1.73_M2}
GLUCOSE SERPL-MCNC: 93 MG/DL (ref 70–99)
POTASSIUM SERPL-SCNC: 3.7 MMOL/L (ref 3.4–5.3)
PROT SERPL-MCNC: 7.7 G/DL (ref 6.8–8.8)
SODIUM SERPL-SCNC: 137 MMOL/L (ref 133–144)

## 2020-11-16 ENCOUNTER — HEALTH MAINTENANCE LETTER (OUTPATIENT)
Age: 45
End: 2020-11-16

## 2021-02-07 ENCOUNTER — HEALTH MAINTENANCE LETTER (OUTPATIENT)
Age: 46
End: 2021-02-07

## 2021-04-05 ENCOUNTER — TRANSFERRED RECORDS (OUTPATIENT)
Dept: HEALTH INFORMATION MANAGEMENT | Facility: CLINIC | Age: 46
End: 2021-04-05

## 2021-04-09 ENCOUNTER — IMMUNIZATION (OUTPATIENT)
Dept: NURSING | Facility: CLINIC | Age: 46
End: 2021-04-09
Payer: COMMERCIAL

## 2021-04-09 PROCEDURE — 91300 PR COVID VAC PFIZER DIL RECON 30 MCG/0.3 ML IM: CPT

## 2021-04-09 PROCEDURE — 0001A PR COVID VAC PFIZER DIL RECON 30 MCG/0.3 ML IM: CPT

## 2021-04-30 ENCOUNTER — IMMUNIZATION (OUTPATIENT)
Dept: NURSING | Facility: CLINIC | Age: 46
End: 2021-04-30
Attending: INTERNAL MEDICINE
Payer: COMMERCIAL

## 2021-04-30 PROCEDURE — 0002A PR COVID VAC PFIZER DIL RECON 30 MCG/0.3 ML IM: CPT

## 2021-04-30 PROCEDURE — 91300 PR COVID VAC PFIZER DIL RECON 30 MCG/0.3 ML IM: CPT

## 2021-06-06 DIAGNOSIS — I10 ESSENTIAL HYPERTENSION: ICD-10-CM

## 2021-06-06 DIAGNOSIS — I10 ESSENTIAL HYPERTENSION WITH GOAL BLOOD PRESSURE LESS THAN 140/90: ICD-10-CM

## 2021-06-07 RX ORDER — LABETALOL 100 MG/1
TABLET, FILM COATED ORAL
Qty: 180 TABLET | Refills: 1 | Status: SHIPPED | OUTPATIENT
Start: 2021-06-07 | End: 2021-12-07

## 2021-06-07 RX ORDER — AMLODIPINE BESYLATE 10 MG/1
TABLET ORAL
Qty: 90 TABLET | Refills: 1 | Status: SHIPPED | OUTPATIENT
Start: 2021-06-07 | End: 2021-12-07

## 2021-06-17 ENCOUNTER — OFFICE VISIT (OUTPATIENT)
Dept: FAMILY MEDICINE | Facility: CLINIC | Age: 46
End: 2021-06-17
Payer: COMMERCIAL

## 2021-06-17 VITALS
BODY MASS INDEX: 29.41 KG/M2 | TEMPERATURE: 98.2 F | OXYGEN SATURATION: 96 % | HEIGHT: 63 IN | HEART RATE: 72 BPM | DIASTOLIC BLOOD PRESSURE: 70 MMHG | WEIGHT: 166 LBS | SYSTOLIC BLOOD PRESSURE: 118 MMHG

## 2021-06-17 DIAGNOSIS — Z12.31 BREAST CANCER SCREENING BY MAMMOGRAM: ICD-10-CM

## 2021-06-17 DIAGNOSIS — Z11.59 NEED FOR HEPATITIS C SCREENING TEST: ICD-10-CM

## 2021-06-17 DIAGNOSIS — I10 ESSENTIAL HYPERTENSION WITH GOAL BLOOD PRESSURE LESS THAN 140/90: ICD-10-CM

## 2021-06-17 DIAGNOSIS — Z00.00 ROUTINE HISTORY AND PHYSICAL EXAMINATION OF ADULT: Primary | ICD-10-CM

## 2021-06-17 DIAGNOSIS — E78.5 HYPERLIPIDEMIA LDL GOAL <130: ICD-10-CM

## 2021-06-17 DIAGNOSIS — Z13.220 SCREENING FOR HYPERLIPIDEMIA: ICD-10-CM

## 2021-06-17 LAB
ALBUMIN SERPL-MCNC: 3.6 G/DL (ref 3.4–5)
ALP SERPL-CCNC: 58 U/L (ref 40–150)
ALT SERPL W P-5'-P-CCNC: 35 U/L (ref 0–50)
ANION GAP SERPL CALCULATED.3IONS-SCNC: 3 MMOL/L (ref 3–14)
AST SERPL W P-5'-P-CCNC: 20 U/L (ref 0–45)
BILIRUB SERPL-MCNC: 0.7 MG/DL (ref 0.2–1.3)
BUN SERPL-MCNC: 9 MG/DL (ref 7–30)
CALCIUM SERPL-MCNC: 8.5 MG/DL (ref 8.5–10.1)
CHLORIDE SERPL-SCNC: 105 MMOL/L (ref 94–109)
CHOLEST SERPL-MCNC: 231 MG/DL
CO2 SERPL-SCNC: 30 MMOL/L (ref 20–32)
CREAT SERPL-MCNC: 0.74 MG/DL (ref 0.52–1.04)
GFR SERPL CREATININE-BSD FRML MDRD: >90 ML/MIN/{1.73_M2}
GLUCOSE SERPL-MCNC: 100 MG/DL (ref 70–99)
HDLC SERPL-MCNC: 46 MG/DL
LDLC SERPL CALC-MCNC: 162 MG/DL
NONHDLC SERPL-MCNC: 185 MG/DL
POTASSIUM SERPL-SCNC: 3.8 MMOL/L (ref 3.4–5.3)
PROT SERPL-MCNC: 7.7 G/DL (ref 6.8–8.8)
SODIUM SERPL-SCNC: 138 MMOL/L (ref 133–144)
TRIGL SERPL-MCNC: 114 MG/DL

## 2021-06-17 PROCEDURE — 99396 PREV VISIT EST AGE 40-64: CPT | Performed by: FAMILY MEDICINE

## 2021-06-17 PROCEDURE — 80061 LIPID PANEL: CPT | Performed by: FAMILY MEDICINE

## 2021-06-17 PROCEDURE — 36415 COLL VENOUS BLD VENIPUNCTURE: CPT | Performed by: FAMILY MEDICINE

## 2021-06-17 PROCEDURE — 99213 OFFICE O/P EST LOW 20 MIN: CPT | Mod: 25 | Performed by: FAMILY MEDICINE

## 2021-06-17 PROCEDURE — 86803 HEPATITIS C AB TEST: CPT | Performed by: FAMILY MEDICINE

## 2021-06-17 PROCEDURE — 80053 COMPREHEN METABOLIC PANEL: CPT | Performed by: FAMILY MEDICINE

## 2021-06-17 ASSESSMENT — MIFFLIN-ST. JEOR: SCORE: 1367.1

## 2021-06-17 NOTE — PROGRESS NOTES
SUBJECTIVE:   CC: Juany Blandon is an 45 year old woman who presents for preventive health visit.     Patient has been advised of split billing requirements and indicates understanding: Yes  Healthy Habits:    Getting at least 3 servings of Calcium per day:  Yes    Bi-annual eye exam:  Yes    Dental care twice a year:  Yes    Sleep apnea or symptoms of sleep apnea:  None    Diet:  Regular (no restrictions)    Frequency of exercise:  1 day/week    Duration of exercise:  N/A    Taking medications regularly:  Yes    Barriers to taking medications:  None    Medication side effects:  None    PHQ-2 Total Score:          PROBLEMS TO ADD ON...  Hyperlipidemia Follow-Up      Are you regularly taking any medication or supplement to lower your cholesterol?   No  But may consider if lipid remains elevated , has question about statin althugh still reluctant to start     Are you having muscle aches or other side effects that you think could be caused by your cholesterol lowering medication?  No    Hypertension Follow-up      Do you check your blood pressure regularly outside of the clinic? Yes     Are you following a low salt diet? Yes    Are your blood pressures ever more than 140 on the top number (systolic) OR more   than 90 on the bottom number (diastolic), for example 140/90? No      Today's PHQ-2 Score:   PHQ-2 ( 1999 Pfizer) 6/17/2021   Q1: Little interest or pleasure in doing things 0   Q2: Feeling down, depressed or hopeless 0   PHQ-2 Score 0       Abuse: Current or Past (Physical, Sexual or Emotional) - No  Do you feel safe in your environment? Yes    Have you ever done Advance Care Planning? (For example, a Health Directive, POLST, or a discussion with a medical provider or your loved ones about your wishes): No, advance care planning information given to patient to review.  Advanced care planning was discussed at today's visit.    Social History     Tobacco Use     Smoking status: Former Smoker     Packs/day:  0.50     Years: 8.00     Pack years: 4.00     Types: Cigarettes     Start date:      Quit date: 2010     Years since quittin.3     Smokeless tobacco: Never Used   Substance Use Topics     Alcohol use: No     Alcohol/week: 0.0 standard drinks     If you drink alcohol do you typically have >3 drinks per day or >7 drinks per week? No    No flowsheet data found.    Reviewed orders with patient.  Reviewed health maintenance and updated orders accordingly - Yes  Patient Active Problem List   Diagnosis     JOINT PAIN-MULT JTS, W/U NEG ~     Former smoker     Lumbar radiculopathy     Gastroesophageal reflux disease, esophagitis presence not specified     Seasonal allergic rhinitis     Essential hypertension with goal blood pressure less than 140/90     BMI 29.0-29.9,adult     Atherosclerosis of both carotid arteries     Precordial pain     Past Surgical History:   Procedure Laterality Date      SECTION           ZZC NONSPECIFIC PROCEDURE      L BREAST BX, bening 2014        Social History     Tobacco Use     Smoking status: Former Smoker     Packs/day: 0.50     Years: 8.00     Pack years: 4.00     Types: Cigarettes     Start date:      Quit date: 2010     Years since quittin.3     Smokeless tobacco: Never Used   Substance Use Topics     Alcohol use: No     Alcohol/week: 0.0 standard drinks     Family History   Problem Relation Age of Onset     Blood Disease Father         non higd lymphoma     Gynecology Mother         fibroids     Lipids Mother      Hypertension Mother      Diabetes Maternal Grandmother      Arthritis Paternal Grandmother      Cerebrovascular Disease Maternal Grandfather          of stroke at age 50+     C.A.D. No family hx of      Breast Cancer No family hx of      Cancer - colorectal No family hx of            Breast Cancer Screening:  Any new diagnosis of family breast, ovarian, or bowel cancer? No    FSH-7: No flowsheet data found.  click delete button  "to remove this line now  Mammogram Screening: Recommended annual mammography  Pertinent mammograms are reviewed under the imaging tab.    History of abnormal Pap smear: NO - age 30- 65 PAP every 3 years recommended  PAP / HPV Latest Ref Rng & Units 2019   PAP - NIL NIL NIL   HPV 16 DNA NEG:Negative Negative Negative -   HPV 18 DNA NEG:Negative Negative Negative -   OTHER HR HPV NEG:Negative Negative Negative -     Reviewed and updated as needed this visit by clinical staff  Tobacco  Allergies  Meds              Reviewed and updated as needed this visit by Provider                Past Medical History:   Diagnosis Date     HX FIBROADENOMA L BREAST      JOINT PAIN-MULT JTS, W/U NEG ~      SPONTANEOUS  03        Review of Systems  CONSTITUTIONAL: NEGATIVE for fever, chills, change in weight  INTEGUMENTARY/SKIN: NEGATIVE for worrisome rashes, moles or lesions  EYES: NEGATIVE for vision changes or irritation  ENT: NEGATIVE for ear, mouth and throat problems except some ongoing nasal congestion for a while has seen ENT, no new sx   RESP: NEGATIVE for significant cough or SOB  BREAST: NEGATIVE for masses, tenderness or discharge  CV: NEGATIVE for chest pain, palpitations or peripheral edema  GI: NEGATIVE for nausea, abdominal pain, heartburn, or change in bowel habits   menopausal female: no unusual urinary symptoms and no unusual vaginal symptoms, cycle is regular   MUSCULOSKELETAL: NEGATIVE for significant arthralgias or myalgia  NEURO: NEGATIVE for weakness, dizziness or paresthesias  ENDOCRINE: NEGATIVE for temperature intolerance, skin/hair changes  HEME/ALLERGY/IMMUNE: NEGATIVE for bleeding problems  PSYCHIATRIC: NEGATIVE for changes in mood or affect      OBJECTIVE:   /70   Pulse 72   Temp 98.2  F (36.8  C) (Tympanic)   Ht 1.6 m (5' 3\")   Wt 75.3 kg (166 lb)   LMP 2021   SpO2 96%   BMI 29.41 kg/m    Physical Exam  GENERAL: healthy, alert and no " distress  EYES: Eyes grossly normal to inspection, PERRL and conjunctivae and sclerae normal  HENT: ear canals and TM's normal, nose and mouth without ulcers or lesions. nose with swollen turbinates and clear  rhinorrhea . No sinus tenderness.   NECK: no adenopathy, no asymmetry, masses, or scars and thyroid normal to palpation  RESP: lungs clear to auscultation - no rales, rhonchi or wheezes  BREAST: normal without masses, tenderness or nipple discharge and no palpable axillary masses or adenopathy  CV: regular rate and rhythm, normal S1 S2, no S3 or S4, no murmur, click or rub, no peripheral edema and peripheral pulses strong  ABDOMEN: soft, nontender, no hepatosplenomegaly, no masses and bowel sounds normal   (female): deferred  RECTAL: deferred  MS: no gross musculoskeletal defects noted, no edema  SKIN: no suspicious lesions or rashes  NEURO: Normal strength and tone, mentation intact and speech normal  PSYCH: mentation appears normal, affect normal/bright        ASSESSMENT/PLAN:   (Z00.00) Routine history and physical examination of adult  (primary encounter diagnosis)  Comment:   Plan: MA SCREENING DIGITAL BILAT - Future  (s+30)            (Z11.59) Need for hepatitis C screening test  Comment:   Plan: Hepatitis C Screen Reflex to HCV RNA Quant and         Genotype            (Z12.31) Breast cancer screening by mammogram  Comment:   Plan: MA SCREENING DIGITAL BILAT - Future  (s+30)      (I10) Essential hypertension with goal blood pressure less than 140/90  Comment:   Plan: Lipid panel reflex to direct LDL Fasting,         STATIN NOT PRESCRIBED (INTENTIONAL),         Comprehensive metabolic panel        BP in adequate control. Discussed cares, low fat low salt  diet etc. Check labs as she is past due.  , call pt with results. Continue with same meds.         Refill has been given previously . encouraged home BP monitoring. Follow up recheck in 6 months, sooner if problem.                 (E78.5)  "Hyperlipidemia LDL goal <130  Comment: reluctant byt may consider   Plan: Lipid panel reflex to direct LDL Fasting,         STATIN NOT PRESCRIBED (INTENTIONAL),         Comprehensive metabolic panel        Discussed her previous lab results , lipid has been high and her LDL has been above goal .  Talked about potential starting statin , although she is a bit reluctant but will consider if her lipid remains elevated .pros and cons of the statins discussed         Talked about low-fat low-cholesterol diet and exercise, Weight control risk of hyperlipidemia.  Etc. she will consider starting statin later and will let me know she wants a prescription.     (Z68.29) BMI 29.0-29.9,adult  Comment:   Plan: Healthy diet and exercise reviewed.  Limit pop and juice intake.  Risks of obesity discussed.  Encourage exercise.      Check labs.Cares and  treatment discussed.  follow up if problem   Patient expressed understanding and agreement with treatment plan. All patient's questions were answered, will let me know if has more later.  Medications: Rx's: Reviewed the potential side effects/complications of medications prescribed.     COUNSELING:  Reviewed preventive health counseling, as reflected in patient instructions       Regular exercise       Healthy diet/nutrition       Vision screening       Hearing screening       Immunizations    Vaccinated for: covid earlier            Osteoporosis prevention/bone health       Consider Hep C screening for all patients one time for ages 18-79 years    Estimated body mass index is 29.41 kg/m  as calculated from the following:    Height as of this encounter: 1.6 m (5' 3\").    Weight as of this encounter: 75.3 kg (166 lb).    Weight management plan: Patient referred to endocrine and/or weight management specialty    She reports that she quit smoking about 11 years ago. Her smoking use included cigarettes. She started smoking about 19 years ago. She has a 4.00 pack-year smoking history. She " has never used smokeless tobacco.      Counseling Resources:  ATP IV Guidelines  Pooled Cohorts Equation Calculator  Breast Cancer Risk Calculator  BRCA-Related Cancer Risk Assessment: FHS-7 Tool  FRAX Risk Assessment  ICSI Preventive Guidelines  Dietary Guidelines for Americans, 2010  USDA's MyPlate  ASA Prophylaxis  Lung CA Screening    Korina Garcia MD  [facility]

## 2021-06-17 NOTE — PATIENT INSTRUCTIONS
Patient Education     Prevention Guidelines, Women Ages 40 to 49  Screening tests and vaccines are an important part of managing your health. A screening test is done to find diseases in people who don't have any symptoms. The goal is to find a disease early so lifestyle changes and checkups can reduce the risk of disease. Or the goal may be to detect it early to treat it most effectively. Screening tests are not used to diagnose a disease. But they are used to see if more testing is needed. Health counseling is important, too. Below are guidelines for these, for women ages 40 to 49. Talk with your healthcare provider to make sure you re up-to-date on what you need.  Screening Who needs it How often   Type 2 diabetes or prediabetes All women beginning at age 45 and women without symptoms at any age who are overweight or obese and have 1 or more additional risk factors for diabetes At least every 3 years1   Type 2 diabetes or prediabetes All women diagnosed with gestational diabetes Lifelong testing every 3 years   Type 2 diabetes All women with prediabetes Every year   Alcohol misuse All women in this age group At routine exams   Blood pressure All women in this age group Yearly checkup if your blood pressure is normal  Normal blood pressure is less than 120/80 mm Hg  If your blood pressure reading is higher than normal, follow the advice of your healthcare provider   Breast cancer All women at average risk in this age group Screening with a mammogram can start at age 40.2 Talk with your healthcare provider to help you decide when to start screening. At age 45 start yearly mammograms.3   Cervical cancer All women in this age group, except women who have had a complete hysterectomy Pap test every 3 years or Pap test plus human papilloma virus (HPV) test every 5 years   Colorectal cancer Women age 45 years and older at average risk Multiple tests are available and are used at different times. Possible tests  include:    Flexible sigmoidoscopy every 5 years, or    Colonoscopy every 10 years, or    CT colonography (virtual colonoscopy) every 5 years, or    Yearly fecal occult blood test, or    Yearly fecal immunochemical test every year, or    Stool DNA test, every 3 years  If you choose a test other than a colonoscopy and have an abnormal test result, you will need to follow-up with a colonoscopy. Screening advice varies among expert groups. Talk with your healthcare provider about which tests are best for you.  Some people should be screened using a different schedule because of their personal or family health history. Talk with your healthcare provider about your health history.   Chlamydia Women at increased risk for infection At routine exams if you're at risk or have symptoms   Depression All women in this age group At routine exams   Gonorrhea Sexually active women at increased risk for infection At routine exams   Hepatitis C Anyone at increased risk; 1 time for those born between 1945 and 1965 At routine exams   High cholesterol or triglycerides All women ages 45 and older who are at risk for coronary artery disease; younger women, talk with your healthcare provider At least every 5 years   HIV All women At routine exams. Those with risk factors for HIV should be tested at least annually.   Obesity All women in this age group At routine exams   Syphilis Women at increased risk for infection-talk with your healthcare provider At routine exams   Tuberculosis Women at increased risk for infection-talk with your healthcare provider Ask your healthcare provider   Vision All women in this age group Complete exam at age 40 and eye exams every 2 to 4 years. If you have a chronic disease, ask your healthcare provider how often you should have your eyes examined.4   Vaccine Who needs it How often   Chickenpox (varicella) All women in this age group who have no record of this infection or vaccine 2 doses; the second dose  should be given at least 4 weeks after the first dose   Hepatitis A Women at increased risk for infection-talk with your healthcare provider 2 doses given 6 months apart   Hepatitis B Women at increased risk for infection-talk with your healthcare provider 3 doses over 6 months; second dose should be given 1 month after the first dose; the third dose should be given at least 2 months after the second dose and at least 4 months after the first dose   Haemophilus influenzae Type B (HIB) Women at increased risk 1 to 3 doses   Influenza (flu) All women in this age group Once a year   Measles, mumps, rubella (MMR) All women in this age group who have no record of these infections or vaccines 1 or 2 doses   Meningococcal Women at increased risk for infection-talk with your healthcare provider 1 or more doses   Pneumococcal conjugate vaccine (PCV13) and pneumococcal polysaccharide vaccine (PPSV23) Women at increased risk for infection-talk with your healthcare provider 1 or 2 doses   Tetanus/diphtheria/pertussis (Td/Tdap) booster All women in this age group A 1-time dose of Tdap instead of a Td booster after age 18, then Td every 10 years   Counseling Who needs it How often   BRCA gene mutation testing for breast and ovarian cancer susceptibility Women with increased risk for having gene mutation When your risk is known   Breast cancer and chemoprevention Women at high risk for breast cancer When your risk is known   Diet and exercise Women who are overweight or obese When diagnosed, and then at routine exams   Domestic violence Women at the age in which they are able to have children At routine exams   Sexually transmitted infection prevention Women at increased risk for infection-talk with your healthcare provider At routine exams   Use of tobacco and the health effects it can cause All women in this age group Every exam   1 American Diabetes Association  2 American College of Obstetricians and Gynecologists   3 American  Cancer Society  4 American Academy of Ophthalmology  Brenda last reviewed this educational content on 11/1/2017 2000-2021 [employer]. All rights reserved. This information is not intended as a substitute for professional medical care. Always follow your healthcare professional's instructions.           Patient Education     Prevention Guidelines, Women Ages 40 to 49  Screening tests and vaccines are an important part of managing your health. A screening test is done to find diseases in people who don't have any symptoms. The goal is to find a disease early so lifestyle changes and checkups can reduce the risk of disease. Or the goal may be to detect it early to treat it most effectively. Screening tests are not used to diagnose a disease. But they are used to see if more testing is needed. Health counseling is important, too. Below are guidelines for these, for women ages 40 to 49. Talk with your healthcare provider to make sure you re up-to-date on what you need.  Screening Who needs it How often   Type 2 diabetes or prediabetes All women beginning at age 45 and women without symptoms at any age who are overweight or obese and have 1 or more additional risk factors for diabetes At least every 3 years1   Type 2 diabetes or prediabetes All women diagnosed with gestational diabetes Lifelong testing every 3 years   Type 2 diabetes All women with prediabetes Every year   Alcohol misuse All women in this age group At routine exams   Blood pressure All women in this age group Yearly checkup if your blood pressure is normal  Normal blood pressure is less than 120/80 mm Hg  If your blood pressure reading is higher than normal, follow the advice of your healthcare provider   Breast cancer All women at average risk in this age group Screening with a mammogram can start at age 40.2 Talk with your healthcare provider to help you decide when to start screening. At age 45 start yearly mammograms.3   Cervical  cancer All women in this age group, except women who have had a complete hysterectomy Pap test every 3 years or Pap test plus human papilloma virus (HPV) test every 5 years   Colorectal cancer Women age 45 years and older at average risk Multiple tests are available and are used at different times. Possible tests include:    Flexible sigmoidoscopy every 5 years, or    Colonoscopy every 10 years, or    CT colonography (virtual colonoscopy) every 5 years, or    Yearly fecal occult blood test, or    Yearly fecal immunochemical test every year, or    Stool DNA test, every 3 years  If you choose a test other than a colonoscopy and have an abnormal test result, you will need to follow-up with a colonoscopy. Screening advice varies among expert groups. Talk with your healthcare provider about which tests are best for you.  Some people should be screened using a different schedule because of their personal or family health history. Talk with your healthcare provider about your health history.   Chlamydia Women at increased risk for infection At routine exams if you're at risk or have symptoms   Depression All women in this age group At routine exams   Gonorrhea Sexually active women at increased risk for infection At routine exams   Hepatitis C Anyone at increased risk; 1 time for those born between 1945 and 1965 At routine exams   High cholesterol or triglycerides All women ages 45 and older who are at risk for coronary artery disease; younger women, talk with your healthcare provider At least every 5 years   HIV All women At routine exams. Those with risk factors for HIV should be tested at least annually.   Obesity All women in this age group At routine exams   Syphilis Women at increased risk for infection-talk with your healthcare provider At routine exams   Tuberculosis Women at increased risk for infection-talk with your healthcare provider Ask your healthcare provider   Vision All women in this age group Complete  exam at age 40 and eye exams every 2 to 4 years. If you have a chronic disease, ask your healthcare provider how often you should have your eyes examined.4   Vaccine Who needs it How often   Chickenpox (varicella) All women in this age group who have no record of this infection or vaccine 2 doses; the second dose should be given at least 4 weeks after the first dose   Hepatitis A Women at increased risk for infection-talk with your healthcare provider 2 doses given 6 months apart   Hepatitis B Women at increased risk for infection-talk with your healthcare provider 3 doses over 6 months; second dose should be given 1 month after the first dose; the third dose should be given at least 2 months after the second dose and at least 4 months after the first dose   Haemophilus influenzae Type B (HIB) Women at increased risk 1 to 3 doses   Influenza (flu) All women in this age group Once a year   Measles, mumps, rubella (MMR) All women in this age group who have no record of these infections or vaccines 1 or 2 doses   Meningococcal Women at increased risk for infection-talk with your healthcare provider 1 or more doses   Pneumococcal conjugate vaccine (PCV13) and pneumococcal polysaccharide vaccine (PPSV23) Women at increased risk for infection-talk with your healthcare provider 1 or 2 doses   Tetanus/diphtheria/pertussis (Td/Tdap) booster All women in this age group A 1-time dose of Tdap instead of a Td booster after age 18, then Td every 10 years   Counseling Who needs it How often   BRCA gene mutation testing for breast and ovarian cancer susceptibility Women with increased risk for having gene mutation When your risk is known   Breast cancer and chemoprevention Women at high risk for breast cancer When your risk is known   Diet and exercise Women who are overweight or obese When diagnosed, and then at routine exams   Domestic violence Women at the age in which they are able to have children At routine exams   Sexually  transmitted infection prevention Women at increased risk for infection-talk with your healthcare provider At routine exams   Use of tobacco and the health effects it can cause All women in this age group Every exam   1 American Diabetes Association  2 American College of Obstetricians and Gynecologists   3 American Cancer Society  4 American Academy of Ophthalmology  Brenda last reviewed this educational content on 11/1/2017 2000-2021 [employer]. All rights reserved. This information is not intended as a substitute for professional medical care. Always follow your healthcare professional's instructions.

## 2021-06-18 ENCOUNTER — TELEPHONE (OUTPATIENT)
Dept: FAMILY MEDICINE | Facility: CLINIC | Age: 46
End: 2021-06-18

## 2021-06-18 LAB — HCV AB SERPL QL IA: NONREACTIVE

## 2021-06-18 NOTE — TELEPHONE ENCOUNTER
Message from Dr. Garcia about lab results:    your lab result are back . See details below.   Feel free to call me or follow up if you have questions or ongoing health concerns     Here is your report.     Normal  liver function tests, kidney functions, glucose and  electrolytes(various salts in your body)       Lipid remains  elevated, and LDL (bad cholesterol ) also remains high and above goal ( should be less then 130) . so recommend  starting treatment. Let me know if you are willing to start  Treatment, as we discussed in the office . Recheck should be 3-4 months after being on medications, but sooner if problem taking medication     In the meantime need to watch diet( low fat, low cholesterol, high fiber diet) exercise. May also take omega-3 fatty acid( fish oil or flex seed oil capsule OTC) to help improve lipid.    Charo RENE RN  EP Triage

## 2021-06-18 NOTE — TELEPHONE ENCOUNTER
Result message given from Dr. Garcia. Pt states she will let us know if she wants to start on a medication for her cholesterol. Scheduled pt for follow up labs in 3 months. . Pt verbalized understanding, all questions answered.     Mellisa Bravo RN

## 2021-06-18 NOTE — TELEPHONE ENCOUNTER
----- Message from Korina Garcia MD sent at 6/18/2021 12:00 AM CDT -----  Please contact pt to inform as well   See results note

## 2021-07-07 ENCOUNTER — ANCILLARY PROCEDURE (OUTPATIENT)
Dept: MAMMOGRAPHY | Facility: CLINIC | Age: 46
End: 2021-07-07
Attending: FAMILY MEDICINE
Payer: COMMERCIAL

## 2021-07-07 DIAGNOSIS — Z00.00 ROUTINE HISTORY AND PHYSICAL EXAMINATION OF ADULT: ICD-10-CM

## 2021-07-07 DIAGNOSIS — Z12.31 BREAST CANCER SCREENING BY MAMMOGRAM: ICD-10-CM

## 2021-07-07 PROCEDURE — 77067 SCR MAMMO BI INCL CAD: CPT | Mod: TC | Performed by: RADIOLOGY

## 2021-07-07 PROCEDURE — 77063 BREAST TOMOSYNTHESIS BI: CPT | Mod: TC | Performed by: RADIOLOGY

## 2021-07-08 ENCOUNTER — HOSPITAL ENCOUNTER (OUTPATIENT)
Dept: MAMMOGRAPHY | Facility: CLINIC | Age: 46
Discharge: HOME | End: 2021-07-08
Attending: FAMILY MEDICINE | Admitting: FAMILY MEDICINE
Payer: COMMERCIAL

## 2021-07-08 DIAGNOSIS — R92.8 ABNORMAL MAMMOGRAM: ICD-10-CM

## 2021-07-08 PROCEDURE — 77065 DX MAMMO INCL CAD UNI: CPT | Mod: LT

## 2021-07-19 ENCOUNTER — HOSPITAL ENCOUNTER (OUTPATIENT)
Dept: MAMMOGRAPHY | Facility: CLINIC | Age: 46
End: 2021-07-19
Attending: FAMILY MEDICINE
Payer: COMMERCIAL

## 2021-07-19 DIAGNOSIS — R92.8 ABNORMAL MAMMOGRAM: ICD-10-CM

## 2021-07-19 PROCEDURE — 272N000715 MA STEREOTACTIC BREAST BIOPSY VACUUM LT

## 2021-07-19 PROCEDURE — 88305 TISSUE EXAM BY PATHOLOGIST: CPT | Mod: 26 | Performed by: PATHOLOGY

## 2021-07-19 PROCEDURE — 88342 IMHCHEM/IMCYTCHM 1ST ANTB: CPT | Mod: TC | Performed by: FAMILY MEDICINE

## 2021-07-19 PROCEDURE — 250N000009 HC RX 250: Performed by: FAMILY MEDICINE

## 2021-07-19 RX ADMIN — LIDOCAINE HYDROCHLORIDE 5 ML: 10 INJECTION, SOLUTION EPIDURAL; INFILTRATION; INTRACAUDAL; PERINEURAL at 10:18

## 2021-07-19 RX ADMIN — LIDOCAINE HYDROCHLORIDE 10 ML: 10; .005 INJECTION, SOLUTION EPIDURAL; INFILTRATION; INTRACAUDAL; PERINEURAL at 10:18

## 2021-07-19 NOTE — DISCHARGE INSTRUCTIONS

## 2021-07-21 ENCOUNTER — TELEPHONE (OUTPATIENT)
Dept: MAMMOGRAPHY | Facility: CLINIC | Age: 46
End: 2021-07-21

## 2021-07-21 LAB
PATH REPORT.COMMENTS IMP SPEC: NORMAL
PATH REPORT.FINAL DX SPEC: NORMAL
PATH REPORT.GROSS SPEC: NORMAL
PATH REPORT.MICROSCOPIC SPEC OTHER STN: NORMAL
PATH REPORT.RELEVANT HX SPEC: NORMAL
PHOTO IMAGE: NORMAL

## 2021-07-21 NOTE — TELEPHONE ENCOUNTER
After review by [facility] Radiologist, Dr. Dave Vergara, Ms. Blandon was called,  verified, and given her 2021 Left Breast Biopsy results (Fat necrosis/Fibrocystic change) and recommended Follow up (Annual Screening).  Biopsy site without issues or concerns.    I encouraged her to contact her doctor with any further breast concerns.

## 2021-09-14 ENCOUNTER — DOCUMENTATION ONLY (OUTPATIENT)
Dept: LAB | Facility: CLINIC | Age: 46
End: 2021-09-14

## 2021-09-14 DIAGNOSIS — E78.5 HYPERLIPIDEMIA LDL GOAL <130: Primary | ICD-10-CM

## 2021-09-14 NOTE — PROGRESS NOTES
your patient is coming to the lab soon and the notes say lipid recheck but no liped is ordered. Would you like to order?  Thanks lab

## 2021-09-16 ENCOUNTER — LAB (OUTPATIENT)
Dept: LAB | Facility: CLINIC | Age: 46
End: 2021-09-16
Payer: COMMERCIAL

## 2021-09-16 DIAGNOSIS — E78.5 HYPERLIPIDEMIA LDL GOAL <130: ICD-10-CM

## 2021-09-16 PROCEDURE — 80061 LIPID PANEL: CPT

## 2021-09-16 PROCEDURE — 36415 COLL VENOUS BLD VENIPUNCTURE: CPT

## 2021-09-18 ENCOUNTER — HEALTH MAINTENANCE LETTER (OUTPATIENT)
Age: 46
End: 2021-09-18

## 2021-09-18 LAB
CHOLEST SERPL-MCNC: 238 MG/DL
FASTING STATUS PATIENT QL REPORTED: YES
HDLC SERPL-MCNC: 42 MG/DL
LDLC SERPL CALC-MCNC: 155 MG/DL
NONHDLC SERPL-MCNC: 196 MG/DL
TRIGL SERPL-MCNC: 204 MG/DL

## 2021-12-05 DIAGNOSIS — I10 ESSENTIAL HYPERTENSION WITH GOAL BLOOD PRESSURE LESS THAN 140/90: ICD-10-CM

## 2021-12-05 DIAGNOSIS — I10 ESSENTIAL HYPERTENSION: ICD-10-CM

## 2021-12-07 RX ORDER — AMLODIPINE BESYLATE 10 MG/1
TABLET ORAL
Qty: 90 TABLET | Refills: 1 | Status: SHIPPED | OUTPATIENT
Start: 2021-12-07 | End: 2022-06-01

## 2021-12-07 RX ORDER — LABETALOL 100 MG/1
TABLET, FILM COATED ORAL
Qty: 180 TABLET | Refills: 1 | Status: SHIPPED | OUTPATIENT
Start: 2021-12-07 | End: 2022-06-01

## 2021-12-07 NOTE — TELEPHONE ENCOUNTER
Prescription approved per [facility] Refill Protocol.  Gertrudis AKHTAR RN  [facility]

## 2022-06-01 DIAGNOSIS — I10 ESSENTIAL HYPERTENSION: ICD-10-CM

## 2022-06-01 DIAGNOSIS — I10 ESSENTIAL HYPERTENSION WITH GOAL BLOOD PRESSURE LESS THAN 140/90: ICD-10-CM

## 2022-06-01 RX ORDER — LABETALOL 100 MG/1
TABLET, FILM COATED ORAL
Qty: 180 TABLET | Refills: 0 | Status: SHIPPED | OUTPATIENT
Start: 2022-06-01 | End: 2022-08-16

## 2022-06-01 RX ORDER — AMLODIPINE BESYLATE 10 MG/1
TABLET ORAL
Qty: 90 TABLET | Refills: 1 | Status: SHIPPED | OUTPATIENT
Start: 2022-06-01 | End: 2022-08-16

## 2022-06-01 NOTE — TELEPHONE ENCOUNTER
Medication filled one time only as pt is due for a follow-up for further refills.  Pharmacy has been notified to inform patient to call clinic and schedule appointment. and My chart has been sent to patient notifying to schedule appointment.  Sonal Mcdonald RN

## 2022-07-13 ENCOUNTER — MYC MEDICAL ADVICE (OUTPATIENT)
Dept: FAMILY MEDICINE | Facility: CLINIC | Age: 47
End: 2022-07-13

## 2022-07-21 ENCOUNTER — TRANSFERRED RECORDS (OUTPATIENT)
Dept: HEALTH INFORMATION MANAGEMENT | Facility: CLINIC | Age: 47
End: 2022-07-21

## 2022-07-21 ENCOUNTER — TELEPHONE (OUTPATIENT)
Dept: FAMILY MEDICINE | Facility: CLINIC | Age: 47
End: 2022-07-21

## 2022-07-21 NOTE — TELEPHONE ENCOUNTER
To: EC TRIAGE      From: Juany Blandon      Created: 7/13/2022 6:22 PM        *-*-*This message was handled on 7/21/2022 3:02 PM by LETA HILL*-*-*    Dawood Garcia,     I felt like I had an episode of meningitis (viral). My skull still feels very soft. It started Sunday after a came home from a birthday party.  I felt upset stomach at the party after I had a slice of ice cream cake. When I came home, I started having a sharp pain in the lower back side of my head. I couldn t sleep all night on Sunday. I took the day off from work on Monday and rested all day. Last night I couldn t sleep due to the pain. Once I had advil, I felt better. I called the office to make an appointment with you today. The earliest you have is on the 16th. She advised me to send a message to you. Can we have tele-visit earlier? Thank you!        Patient Contact     Attempt # 1     Was call answered?    No  Left non-detailed message to call the clinic back at [phone].      On call back:      -Triage symptoms.     Leta AKHTAR RN  [facility]

## 2022-07-21 NOTE — TELEPHONE ENCOUNTER
See triage telephone encounter from 7/21/22. [employer] message sent to the patient advising to call triage. Attempted to call pt and left non-detailed vm to call and speak to nurse.   Gertrudis AKHTAR RN  [facility]

## 2022-08-03 NOTE — TELEPHONE ENCOUNTER
Call attempt #2.   Left voice mail message, she has appointment 8/16/2022 for the headache symptom.     Leonie Bueno RN  [facility]

## 2022-08-16 ENCOUNTER — OFFICE VISIT (OUTPATIENT)
Dept: FAMILY MEDICINE | Facility: CLINIC | Age: 47
End: 2022-08-16
Payer: COMMERCIAL

## 2022-08-16 VITALS
DIASTOLIC BLOOD PRESSURE: 60 MMHG | TEMPERATURE: 98.1 F | SYSTOLIC BLOOD PRESSURE: 108 MMHG | OXYGEN SATURATION: 93 % | HEART RATE: 75 BPM | BODY MASS INDEX: 29.05 KG/M2 | WEIGHT: 164 LBS

## 2022-08-16 DIAGNOSIS — J30.89 SEASONAL ALLERGIC RHINITIS DUE TO OTHER ALLERGIC TRIGGER: ICD-10-CM

## 2022-08-16 DIAGNOSIS — R09.81 NASAL CONGESTION: ICD-10-CM

## 2022-08-16 DIAGNOSIS — Z12.31 VISIT FOR SCREENING MAMMOGRAM: ICD-10-CM

## 2022-08-16 DIAGNOSIS — N60.11 FIBROCYSTIC BREAST CHANGES OF BOTH BREASTS: ICD-10-CM

## 2022-08-16 DIAGNOSIS — I10 ESSENTIAL HYPERTENSION: ICD-10-CM

## 2022-08-16 DIAGNOSIS — Z12.11 SCREEN FOR COLON CANCER: ICD-10-CM

## 2022-08-16 DIAGNOSIS — Z13.220 SCREENING FOR HYPERLIPIDEMIA: ICD-10-CM

## 2022-08-16 DIAGNOSIS — I10 ESSENTIAL HYPERTENSION WITH GOAL BLOOD PRESSURE LESS THAN 140/90: ICD-10-CM

## 2022-08-16 DIAGNOSIS — Z12.4 CERVICAL CANCER SCREENING: ICD-10-CM

## 2022-08-16 DIAGNOSIS — Z00.00 ROUTINE HISTORY AND PHYSICAL EXAMINATION OF ADULT: Primary | ICD-10-CM

## 2022-08-16 DIAGNOSIS — N60.12 FIBROCYSTIC BREAST CHANGES OF BOTH BREASTS: ICD-10-CM

## 2022-08-16 PROBLEM — R92.0 BREAST MICROCALCIFICATIONS: Status: ACTIVE | Noted: 2022-08-16

## 2022-08-16 PROCEDURE — 99213 OFFICE O/P EST LOW 20 MIN: CPT | Mod: 25 | Performed by: FAMILY MEDICINE

## 2022-08-16 PROCEDURE — G0145 SCR C/V CYTO,THINLAYER,RESCR: HCPCS | Performed by: FAMILY MEDICINE

## 2022-08-16 PROCEDURE — 87624 HPV HI-RISK TYP POOLED RSLT: CPT | Performed by: FAMILY MEDICINE

## 2022-08-16 PROCEDURE — 99396 PREV VISIT EST AGE 40-64: CPT | Performed by: FAMILY MEDICINE

## 2022-08-16 RX ORDER — LABETALOL 100 MG/1
100 TABLET, FILM COATED ORAL 2 TIMES DAILY
Qty: 180 TABLET | Refills: 1 | Status: SHIPPED | OUTPATIENT
Start: 2022-08-16 | End: 2023-02-20

## 2022-08-16 RX ORDER — FLUTICASONE PROPIONATE 50 MCG
1-2 SPRAY, SUSPENSION (ML) NASAL DAILY
Qty: 16 G | Status: SHIPPED | OUTPATIENT
Start: 2022-08-16 | End: 2022-08-18

## 2022-08-16 RX ORDER — AMLODIPINE BESYLATE 10 MG/1
10 TABLET ORAL DAILY
Qty: 90 TABLET | Refills: 2 | Status: SHIPPED | OUTPATIENT
Start: 2022-08-16 | End: 2023-02-20

## 2022-08-16 NOTE — PROGRESS NOTES
SUBJECTIVE:   CC: Juany Blandon is an 46 year old woman who presents for preventive health visit.       Patient has been advised of split billing requirements and indicates understanding: Yes  Healthy Habits:     Getting at least 3 servings of Calcium per day:  Yes    Bi-annual eye exam:  NO    Dental care twice a year:  Yes    Diet:  Regular (no restrictions)    Frequency of exercise:  2-3 days/week    Taking medications regularly:  Yes    PHQ-2 Total Score: 0  History of Present Illness       Hypertension: She presents for follow up of hypertension.  She does check blood pressure  regularly outside of the clinic. Outpatient blood pressures have not been over 140/90. She follows a low salt diet.            PROBLEMS TO ADD ON...    Hyperlipidemia Follow-Up      Are you regularly taking any medication or supplement to lower your cholesterol?   Yes- see epic     Are you having muscle aches or other side effects that you think could be caused by your cholesterol lowering medication?  No    Hypertension Follow-up      Do you check your blood pressure regularly outside of the clinic? Yes     Are you following a low salt diet? Yes    Are your blood pressures ever more than 140 on the top number (systolic) OR more   than 90 on the bottom number (diastolic), for example 140/90? No      Today's PHQ-2 Score:   PHQ-2 ( 1999 [employer]) 8/16/2022   Q1: Little interest or pleasure in doing things 0   Q2: Feeling down, depressed or hopeless 0   PHQ-2 Score 0   PHQ-2 Total Score (12-17 Years)- Positive if 3 or more points; Administer PHQ-A if positive -   Q1: Little interest or pleasure in doing things Not at all   Q2: Feeling down, depressed or hopeless Not at all   PHQ-2 Score 0       Abuse: Current or Past (Physical, Sexual or Emotional) - No  Do you feel safe in your environment? Yes        Social History     Tobacco Use     Smoking status: Former Smoker     Packs/day: 0.50     Years: 8.00     Pack years: 4.00     Types:  Cigarettes     Start date:      Quit date: 2010     Years since quittin.5     Smokeless tobacco: Never Used   Substance Use Topics     Alcohol use: No     Alcohol/week: 0.0 standard drinks           Reviewed orders with patient.  Reviewed health maintenance and updated orders accordingly - Yes  Patient Active Problem List   Diagnosis     JOINT PAIN-TIARRA KOEHLER, W/U NEG ~     Former smoker     Lumbar radiculopathy     Gastroesophageal reflux disease, esophagitis presence not specified     Seasonal allergic rhinitis     Essential hypertension with goal blood pressure less than 140/90     BMI 29.0-29.9,adult     Atherosclerosis of both carotid arteries     Precordial pain     Hyperlipidemia LDL goal <130     Past Surgical History:   Procedure Laterality Date      SECTION           ZZC NONSPECIFIC PROCEDURE      L BREAST BX, bening 2014        Social History     Tobacco Use     Smoking status: Former Smoker     Packs/day: 0.50     Years: 8.00     Pack years: 4.00     Types: Cigarettes     Start date:      Quit date: 2010     Years since quittin.5     Smokeless tobacco: Never Used   Substance Use Topics     Alcohol use: No     Alcohol/week: 0.0 standard drinks     Family History   Problem Relation Age of Onset     Gynecology Mother         fibroids     Lipids Mother      Hypertension Mother      Blood Disease Father         non higd lymphoma     Diabetes Maternal Grandmother      Cerebrovascular Disease Maternal Grandfather          of stroke at age 50+     Arthritis Paternal Grandmother      C.A.D. No family hx of      Breast Cancer No family hx of      Cancer - colorectal No family hx of      Colon Cancer No family hx of            Breast Cancer Screening:    FHS-7:   Breast CA Risk Assessment (FHS-7) 2021   Did any of your first-degree relatives have breast or ovarian cancer? No   Did any of your relatives have bilateral breast cancer? No   Did any man in your  family have breast cancer? No   Did any woman in your family have breast and ovarian cancer? No   Did any woman in your family have breast cancer before age 50 y? No   Do you have 2 or more relatives with breast and/or ovarian cancer? No   Do you have 2 or more relatives with breast and/or bowel cancer? No       Mammogram Screening: Recommended annual mammography  Pertinent mammograms are reviewed under the imaging tab.    History of abnormal Pap smear: NO - age 30- 65 PAP every 3 years recommended  PAP / HPV Latest Ref Rng & Units 2019   PAP (Historical) - NIL NIL NIL   HPV16 NEG:Negative Negative Negative -   HPV18 NEG:Negative Negative Negative -   HRHPV NEG:Negative Negative Negative -     Reviewed and updated as needed this visit by clinical staff   Tobacco  Allergies  Meds      Soc Hx          Reviewed and updated as needed this visit by Provider                   Past Medical History:   Diagnosis Date     HX FIBROADENOMA L BREAST      JOINT PAIN-TIARRA KOEHLER, W/U NEG ~      SPONTANEOUS  03        Review of Systems  CONSTITUTIONAL: NEGATIVE for fever, chills, change in weight. Had possible viral illness earlier few weks ago but sx have resolved and had negative codid test as well   INTEGUMENTARU/SKIN: NEGATIVE for worrisome rashes, moles or lesions  EYES: NEGATIVE for vision changes or irritation  ENT: NEGATIVE for ear, mouth and throat problems  RESP: NEGATIVE for significant cough or SOB  BREAST: NEGATIVE for masses, tenderness or discharge  CV: NEGATIVE for chest pain, palpitations or peripheral edema  GI: NEGATIVE for nausea, abdominal pain, heartburn, or change in bowel habits  : NEGATIVE for unusual urinary or vaginal symptoms. Periods are regular.  MUSCULOSKELETAL: NEGATIVE for significant arthralgias or myalgia  NEURO: NEGATIVE for weakness, dizziness or paresthesias  ENDOCRINE: NEGATIVE for temperature intolerance, skin/hair changes  HEME/ALLERGY/IMMUNE:  NEGATIVE for bleeding problems  PSYCHIATRIC: NEGATIVE for changes in mood or affect     OBJECTIVE:   /60   Pulse 75   Temp 98.1  F (36.7  C) (Tympanic)   Wt 74.4 kg (164 lb)   LMP 08/06/2022 (Approximate)   SpO2 93%   BMI 29.05 kg/m    Physical Exam  GENERAL: healthy, alert and no distress  EYES: Eyes grossly normal to inspection, PERRL and conjunctivae and sclerae normal  HENT: ear canals and TM's normal, nose and mouth without ulcers or lesions  NECK: no adenopathy, no asymmetry, masses, or scars and thyroid normal to palpation  RESP: lungs clear to auscultation - no rales, rhonchi or wheezes  BREAST: normal without masses, tenderness or nipple discharge and no palpable axillary masses or adenopathy  CV: regular rate and rhythm, normal S1 S2, no S3 or S4, no murmur, click or rub, no peripheral edema and peripheral pulses strong  ABDOMEN: soft, nontender, no hepatosplenomegaly, no masses and bowel sounds normal   (female): normal female external genitalia, normal urethral meatus , vaginal mucosa pink, moist, well rugated and normal cervix, adnexae, and uterus without masses.  RECTAL: deferred  MS: no gross musculoskeletal defects noted, no edema  SKIN: no suspicious lesions or rashes  NEURO: Normal strength and tone, mentation intact and speech normal  PSYCH: mentation appears normal, affect normal/bright        ASSESSMENT/PLAN:     (Z00.00) Routine history and physical examination of adult  (primary encounter diagnosis)  Comment:   Plan: REVIEW OF HEALTH MAINTENANCE PROTOCOL ORDERS            (Z12.11) Screen for colon cancer  Comment:   Plan: Colonscopy Screening  Referral            (Z12.31) Visit for screening mammogram  Comment:   Plan: MA SCREENING DIGITAL BILAT - Future  (s+30)            (Z13.220) Screening for hyperlipidemia  Comment:   Plan: Lipid panel reflex to direct LDL Non-fasting            (Z12.4) Cervical cancer screening  Comment:   Plan: Pap Screen with HPV - recommended  "age 30 - 65         years            (I10) Essential hypertension  Comment:   Plan: COMPREHENSIVE METABOLIC PANEL, labetalol         (NORMODYNE) 100 MG tablet          BP in adequate control. Discussed cares, low fat low salt  diet etc. Check labs.  Refill given. encouraged home BP monitoring. Follow up recheck in 6 months, sooner if problem.               (R09.81) Nasal congestion  Comment: stable on med's   Plan: fluticasone (FLONASE) 50 MCG/ACT nasal spray            (J30.89) Seasonal allergic rhinitis due to other allergic trigger  Comment: stable   Plan: fluticasone (FLONASE) 50 MCG/ACT nasal spray            (N60.11,  N60.12) Fibrocystic breast changes of both breasts  Comment:   Plan: fluticasone (FLONASE) 50 MCG/ACT nasal spray            (Z68.29) BMI 29.0-29.9,adult  Comment:   Plan: Healthy diet and exercise reviewed. Talked about intermittent fasting/ weight watchers, meal planning , reducing carb's and adding more protein etc. Risks of obesity discussed.  Encourage exercise.       COUNSELING:  Reviewed preventive health counseling, as reflected in patient instructions       Regular exercise       Healthy diet/nutrition       Vision screening       Immunizations    utd            Alcohol Use       Contraception       Osteoporosis prevention/bone health       Colorectal Cancer Screening       Consider Hep C screening for all patients one time for ages 18-79 years       HIV screeninx in teen years, 1x in adult years, and at intervals if high risk       (Janie)menopause management    Estimated body mass index is 29.05 kg/m  as calculated from the following:    Height as of 21: 1.6 m (5' 3\").    Weight as of this encounter: 74.4 kg (164 lb).    Weight management plan: Discussed healthy diet and exercise guidelines    She reports that she quit smoking about 12 years ago. Her smoking use included cigarettes. She started smoking about 20 years ago. She has a 4.00 pack-year smoking history. She has never " used smokeless tobacco.      Counseling Resources:  ATP IV Guidelines  Pooled Cohorts Equation Calculator  Breast Cancer Risk Calculator  BRCA-Related Cancer Risk Assessment: FHS-7 Tool  FRAX Risk Assessment  ICSI Preventive Guidelines  Dietary Guidelines for Americans, 2010  USDA's MyPlate  ASA Prophylaxis  Lung CA Screening    Korina Garcia MD  [facility]

## 2022-08-17 DIAGNOSIS — N60.11 FIBROCYSTIC BREAST CHANGES OF BOTH BREASTS: ICD-10-CM

## 2022-08-17 DIAGNOSIS — J30.89 SEASONAL ALLERGIC RHINITIS DUE TO OTHER ALLERGIC TRIGGER: ICD-10-CM

## 2022-08-17 DIAGNOSIS — N60.12 FIBROCYSTIC BREAST CHANGES OF BOTH BREASTS: ICD-10-CM

## 2022-08-17 DIAGNOSIS — R09.81 NASAL CONGESTION: ICD-10-CM

## 2022-08-17 NOTE — LETTER
August 26, 2022      Juany TONE Jamia  [address]  [place] [address]          Dear Ms. Blandon,    Our records indicate that it is time to schedule a visit with your primary care provider.  You are due to be seen for a follow-up of medications.  We have sent to the pharmacy a 3 month refill of your medication until you can be seen by your provider.  You may call [phone] to schedule or via [employer] using the appointment tab.  If you are no longer a [facility] patient; please contact us and let us know that as well.  You will need to let the pharmacy know the name of your new provider so that they can send future refill requests to them.    Sincerely,    [facility]

## 2022-08-18 ENCOUNTER — LAB (OUTPATIENT)
Dept: LAB | Facility: CLINIC | Age: 47
End: 2022-08-18
Payer: COMMERCIAL

## 2022-08-18 DIAGNOSIS — I10 ESSENTIAL HYPERTENSION: ICD-10-CM

## 2022-08-18 DIAGNOSIS — Z13.220 SCREENING FOR HYPERLIPIDEMIA: ICD-10-CM

## 2022-08-18 DIAGNOSIS — I10 ESSENTIAL HYPERTENSION WITH GOAL BLOOD PRESSURE LESS THAN 140/90: ICD-10-CM

## 2022-08-18 LAB
ALBUMIN SERPL-MCNC: 3.8 G/DL (ref 3.4–5)
ALP SERPL-CCNC: 53 U/L (ref 40–150)
ALT SERPL W P-5'-P-CCNC: 24 U/L (ref 0–50)
ANION GAP SERPL CALCULATED.3IONS-SCNC: 8 MMOL/L (ref 3–14)
AST SERPL W P-5'-P-CCNC: 15 U/L (ref 0–45)
BILIRUB SERPL-MCNC: 1.2 MG/DL (ref 0.2–1.3)
BKR LAB AP GYN ADEQUACY: NORMAL
BKR LAB AP GYN INTERPRETATION: NORMAL
BKR LAB AP HPV REFLEX: NORMAL
BKR LAB AP LMP: NORMAL
BKR LAB AP PREVIOUS ABNORMAL: NORMAL
BUN SERPL-MCNC: 10 MG/DL (ref 7–30)
CALCIUM SERPL-MCNC: 8.7 MG/DL (ref 8.5–10.1)
CHLORIDE BLD-SCNC: 102 MMOL/L (ref 94–109)
CHOLEST SERPL-MCNC: 205 MG/DL
CO2 SERPL-SCNC: 25 MMOL/L (ref 20–32)
CREAT SERPL-MCNC: 0.73 MG/DL (ref 0.52–1.04)
FASTING STATUS PATIENT QL REPORTED: YES
GFR SERPL CREATININE-BSD FRML MDRD: >90 ML/MIN/1.73M2
GLUCOSE BLD-MCNC: 98 MG/DL (ref 70–99)
HDLC SERPL-MCNC: 42 MG/DL
LDLC SERPL CALC-MCNC: 138 MG/DL
NONHDLC SERPL-MCNC: 163 MG/DL
PATH REPORT.COMMENTS IMP SPEC: NORMAL
PATH REPORT.COMMENTS IMP SPEC: NORMAL
PATH REPORT.RELEVANT HX SPEC: NORMAL
POTASSIUM BLD-SCNC: 3.8 MMOL/L (ref 3.4–5.3)
PROT SERPL-MCNC: 7.4 G/DL (ref 6.8–8.8)
SODIUM SERPL-SCNC: 135 MMOL/L (ref 133–144)
TRIGL SERPL-MCNC: 125 MG/DL

## 2022-08-18 PROCEDURE — 36415 COLL VENOUS BLD VENIPUNCTURE: CPT

## 2022-08-18 PROCEDURE — 80061 LIPID PANEL: CPT

## 2022-08-18 PROCEDURE — 80053 COMPREHEN METABOLIC PANEL: CPT

## 2022-08-18 NOTE — TELEPHONE ENCOUNTER
Please see pt request for higher dispense volume, previous rx sent by provider. Are you Ok with higher dispense volume at this time?    Gertrudis AKHTAR RN  [facility]

## 2022-08-20 RX ORDER — FLUTICASONE PROPIONATE 50 MCG
1-2 SPRAY, SUSPENSION (ML) NASAL DAILY
Qty: 48 G | Refills: 3 | Status: SHIPPED | OUTPATIENT
Start: 2022-08-20 | End: 2022-11-18

## 2022-08-22 LAB
HUMAN PAPILLOMA VIRUS 16 DNA: NEGATIVE
HUMAN PAPILLOMA VIRUS 18 DNA: NEGATIVE
HUMAN PAPILLOMA VIRUS FINAL DIAGNOSIS: NORMAL
HUMAN PAPILLOMA VIRUS OTHER HR: NEGATIVE

## 2022-09-06 ENCOUNTER — HOSPITAL ENCOUNTER (OUTPATIENT)
Dept: MAMMOGRAPHY | Facility: CLINIC | Age: 47
Discharge: HOME | End: 2022-09-06
Attending: FAMILY MEDICINE | Admitting: FAMILY MEDICINE
Payer: COMMERCIAL

## 2022-09-06 DIAGNOSIS — Z12.31 VISIT FOR SCREENING MAMMOGRAM: ICD-10-CM

## 2022-09-06 PROCEDURE — 77067 SCR MAMMO BI INCL CAD: CPT

## 2022-09-08 ENCOUNTER — TELEPHONE (OUTPATIENT)
Dept: GASTROENTEROLOGY | Facility: CLINIC | Age: 47
End: 2022-09-08

## 2022-09-08 NOTE — TELEPHONE ENCOUNTER
Screening Questions     BlueKIND OF PREP RedLOCATION [review exclusion criteria] GreenSEDATION TYPE      1. Are you active on mychart? Y    2. What insurance is in the chart? BCBS     3.   Ordering/Referring Provider: Korina Garcia MD     4. BMI   (If greater than 40 review exclusion criteria [PAC APPT IF [MAC] @ [facility])  29.0  [If yes, BMI OVER 40-EXTENDED PREP]      **(Sedation review/consideration needed)**  Do you have a legal guardian or Medical Power of    and/or are you able to give consent for your medical care?     Y    5. Have you had a positive covid test in the last 90 days?   N -     6.  Are you currently on dialysis?   N [ If yes, G-PREP & HOSPITAL setting ONLY]     7.  Do you have chronic kidney disease?  N [ If yes, G-PREP ]    8.   Do you have a diagnosis of diabetes?   N   [ If yes, G-PREP ]    9.  On a regular basis do you go 3-5 days between bowel movements?   N   [ If yes, EXTENDED PREP]    10.  Are you taking any prescription pain medications on a routine schedule?    N -  [ If yes, EXTENDED PREP] [If yes, MAC]      11.   Do you have any chemical dependencies such as alcohol, street drugs, or methadone?    N [If yes, MAC]    12.   Do you have any history of post-traumatic stress syndrome, severe anxiety or history of psychosis?    N  [If yes, MAC]    13.  [FEMALES] Are you currently pregnant? N    If yes, how many weeks?       Respiratory/Heart Screening:  [If yes to any of the following HOSPITAL setting only]     14. Do you have Pulmonary Hypertension [Lungs]?   N       15. Do you have UNCONTROLLED asthma?   N     16.  Do you use daily home oxygen?  N      17. Do you have mod to severe Obstructive Sleep Apnea?         (OKAY @  [facility]  SH  PH  RI  MG - if pt is not on OXYGEN)  N      18.   Have you had a heart or lung transplant?   N      19.   Have you had a stroke or Transient ischemic attack (TIA - aka  mini stroke ) within 6 months?  (If yes, please review exclusion  criteria)  N     20.   In the past 6 months, have you had any heart related issues including cardiomyopathy or heart attack?   N           If yes, did it require cardiac stenting or other implantable device?   NA      21.   Do you have any implantable devices in your body (pacemaker, defib, LVAD)? (If yes, please review exclusion criteria)  N     22.  Do you take the medication Phentermine?  NO        23. Do you take nitroglycerin?   N           If yes, how often? NAN  (if yes, HOSPITAL setting ONLY)    24.  Are you currently taking any blood thinners?    [If yes, INFORM patient to follw up w/ ORDERING PROVIDER FOR BRIDGING INSTRUCTIONS]     Y, ASPIRIN     25.   Do you transfer independently?                (If NO, please HOSPITAL setting ONLY)  Y    26.   Preferred LOCAL Pharmacy for Pre Prescription:        [id number] - [place], [place] - [address]  AT [facility] [address]    Scheduling Details  (Please ask for phone number if not scheduled by patient)      Caller : Juany Blandon    Date of Procedure: 10/24  Surgeon: JAMSHID  Location:         Sedation Type: CS l PER PROTOCOL  Conscious Sedation- Needs  for 6 hours after the procedure  MAC/General-Needs  for 24 hours after procedure    N :[Pre-op Required] at [facility]  MG and OR for MAC sedation   (advise patient they will need a pre-op WITH IN 30 DAYS of procedure date)     Type of Procedure Scheduled:   Lower Endoscopy [Colonoscopy]    Which Colonoscopy Prep was Sent?:   GOLYTELY - MAG CITRATE RECALL    KHORUTS CF PATIENTS & GROEN'S PATIENTS NEEDS EXTENDED PREP       Informed patient they will need an adult  Y  Cannot take any type of public or medical transportation alone    Pre-Procedure Covid test to be completed at [facility] or Externally: Y  **INFORMED OF HOME TESTING & LAB OPTION**        Confirmed Nurse will call to complete assessment Y    Additional comments:

## 2022-10-24 ENCOUNTER — HOSPITAL ENCOUNTER (OUTPATIENT)
Facility: CLINIC | Age: 47
Discharge: HOME | End: 2022-10-24
Attending: COLON & RECTAL SURGERY | Admitting: COLON & RECTAL SURGERY
Payer: COMMERCIAL

## 2022-10-24 VITALS
SYSTOLIC BLOOD PRESSURE: 110 MMHG | DIASTOLIC BLOOD PRESSURE: 73 MMHG | WEIGHT: 164 LBS | BODY MASS INDEX: 29.05 KG/M2 | OXYGEN SATURATION: 96 % | HEART RATE: 66 BPM | RESPIRATION RATE: 22 BRPM

## 2022-10-24 DIAGNOSIS — Z12.11 ENCOUNTER FOR SCREENING COLONOSCOPY: Primary | ICD-10-CM

## 2022-10-24 LAB — COLONOSCOPY: NORMAL

## 2022-10-24 PROCEDURE — 88305 TISSUE EXAM BY PATHOLOGIST: CPT | Mod: TC | Performed by: COLON & RECTAL SURGERY

## 2022-10-24 PROCEDURE — 88305 TISSUE EXAM BY PATHOLOGIST: CPT | Mod: 26 | Performed by: PATHOLOGY

## 2022-10-24 PROCEDURE — 45385 COLONOSCOPY W/LESION REMOVAL: CPT | Performed by: COLON & RECTAL SURGERY

## 2022-10-24 PROCEDURE — G0500 MOD SEDAT ENDO SERVICE >5YRS: HCPCS | Mod: PT | Performed by: COLON & RECTAL SURGERY

## 2022-10-24 PROCEDURE — 250N000011 HC RX IP 250 OP 636: Performed by: COLON & RECTAL SURGERY

## 2022-10-24 RX ORDER — FENTANYL CITRATE 50 UG/ML
INJECTION, SOLUTION INTRAMUSCULAR; INTRAVENOUS PRN
Status: DISCONTINUED | OUTPATIENT
Start: 2022-10-24 | End: 2022-10-24 | Stop reason: HOSPADM

## 2022-10-24 RX ORDER — LIDOCAINE 40 MG/G
CREAM TOPICAL
Status: DISCONTINUED | OUTPATIENT
Start: 2022-10-24 | End: 2022-10-24 | Stop reason: HOSPADM

## 2022-10-24 RX ORDER — ONDANSETRON 2 MG/ML
4 INJECTION INTRAMUSCULAR; INTRAVENOUS
Status: DISCONTINUED | OUTPATIENT
Start: 2022-10-24 | End: 2022-10-24 | Stop reason: HOSPADM

## 2022-10-24 ASSESSMENT — ACTIVITIES OF DAILY LIVING (ADL): ADLS_ACUITY_SCORE: 35

## 2022-10-24 NOTE — H&P
Colon & Rectal Surgery History and Physical  Pre-Endoscopy Procedure Note    History of Present Illness   I have been asked by Dr. Garcia to evaluate this 46 year old female for colorectal cancer screening. She currently denies any abdominal pain, weight loss, bleeding per rectum, or recent change in bowel habits.    Past Medical History  Diagnosis Date     FIBROADENOMA L BREAST      SPONTANEOUS  03   Hypertension  Hyperipidemia    Past Surgical History  Procedure Laterality Date      SECTION           BREAST SURGERY      L BREAST BX, benign         Medications  Medication Sig     amLODIPine (NORVASC) 10 MG tablet Take 1 tablet (10 mg) by mouth daily     aspirin 81 MG tablet Take 81 mg by mouth daily     fluticasone (FLONASE) 50 MCG/ACT nasal spray Spray 1-2 sprays into both nostrils daily for 90 days     labetalol (NORMODYNE) 100 MG tablet Take 1 tablet (100 mg) by mouth 2 times daily     loratadine (CLARITIN) 10 MG tablet Take 10 mg by mouth daily     STATIN NOT PRESCRIBED (INTENTIONAL) Please choose reason not prescribed from choices below.       Allergies  Allergen Reactions     Azithromycin Swelling        Family History   Family history includes Arthritis in her paternal grandmother; Blood Disease in her father; Cerebrovascular Disease in her maternal grandfather; Diabetes in her maternal grandmother; Gynecology Disorder in her mother; Hypertension in her mother; Lipid DIsorder in her mother.     Social History   She reports that she quit smoking about 12 years ago. Her smoking use included cigarettes. She started smoking about 20 years ago. She has a 4.00 pack-year smoking history. She has never used smokeless tobacco. She reports that she does not drink alcohol and does not use drugs.    Review of Systems   Constitutional:  No fever, weight change or fatigue.    Eyes:     No dry eyes or vision changes.   Ears/Nose/Throat/Neck:  No oral ulcers, sore throat or voice change.     Cardiovascular:   No palpitations, syncope, angina or edema.   Respiratory:    No chest pain, excessive sleepiness, shortness of breath or hemoptysis.    Gastrointestinal:   No abdominal pain, nausea, vomiting, diarrhea or heartburn.    Genitourinary:   No dysuria, hematuria, urinary retention or urinary frequency.   Musculoskeletal:  No joint swelling or arthralgias.    Dermatologic:  No skin rash or other skin changes.   Neurologic:    No focal weakness or numbness. No neuropathy.   Psychiatric:    No depression, anxiety, suicidal ideation, or paranoid ideation.   Endocrine:   No cold or heat intolerance, polydipsia, hirsutism, change in libido, or flushing.   Hematology/Lymphatic:  No bleeding or lymphadenopathy.    Allergy/Immunology:  No rhinitis or hives.     Physical Exam   Vitals:  /72, RR 16, HR 64, weight 74.4 kg (164 lb), SpO2 97 %, not currently breastfeeding.    General:  Alert and oriented to person, place and time   Airway: Normal oropharyngeal airway and neck mobility   Lungs:  Clear bilaterally   Heart:  Regular rate and rhythm   Abdomen: Soft, NT, ND, no masses   Extremities: Warm, good capillary refill    ASA Grade: II (mild systemic disease)    Impression: Cleared for use of conscious sedation for colorectal cancer screening    Plan: Proceed with colonoscopy     Cailin Romero MD  [facility]  [phone]

## 2022-10-25 LAB
PATH REPORT.COMMENTS IMP SPEC: NORMAL
PATH REPORT.COMMENTS IMP SPEC: NORMAL
PATH REPORT.FINAL DX SPEC: NORMAL
PATH REPORT.GROSS SPEC: NORMAL
PATH REPORT.MICROSCOPIC SPEC OTHER STN: NORMAL
PATH REPORT.RELEVANT HX SPEC: NORMAL
PHOTO IMAGE: NORMAL

## 2022-11-20 ENCOUNTER — HEALTH MAINTENANCE LETTER (OUTPATIENT)
Age: 47
End: 2022-11-20

## 2023-02-07 ENCOUNTER — OFFICE VISIT (OUTPATIENT)
Dept: FAMILY MEDICINE | Facility: CLINIC | Age: 48
End: 2023-02-07
Payer: COMMERCIAL

## 2023-02-07 VITALS
DIASTOLIC BLOOD PRESSURE: 74 MMHG | BODY MASS INDEX: 29.96 KG/M2 | SYSTOLIC BLOOD PRESSURE: 118 MMHG | OXYGEN SATURATION: 99 % | HEIGHT: 62 IN | TEMPERATURE: 98.4 F | RESPIRATION RATE: 16 BRPM | HEART RATE: 79 BPM | WEIGHT: 162.8 LBS

## 2023-02-07 DIAGNOSIS — R04.0 EPISTAXIS: Primary | ICD-10-CM

## 2023-02-07 PROCEDURE — 99213 OFFICE O/P EST LOW 20 MIN: CPT | Performed by: PHYSICIAN ASSISTANT

## 2023-02-07 ASSESSMENT — PAIN SCALES - GENERAL: PAINLEVEL: NO PAIN (0)

## 2023-02-07 NOTE — PROGRESS NOTES
"  Assessment & Plan   1. Epistaxis  Advise that she discontinue aspirin for a short time. Continue to use nasal saline, hold pressure, use Vaseline to nares at night and humidifier.  Follow up with ENT if this does not resolve in 2 weeks with conservative treatment.   - Adult ENT  Referral; Future       BMI:   Estimated body mass index is 29.78 kg/m  as calculated from the following:    Height as of this encounter: 1.575 m (5' 2\").    Weight as of this encounter: 73.8 kg (162 lb 12.8 oz).        follow up as needed  Nolberto Pro PA-C  [facility] [place] is a 47 year old presenting for the following health issues:  Nose Bleeds      History of Present Illness       Reason for visit:  Nose bleeding    She eats 2-3 servings of fruits and vegetables daily.She consumes 0 sweetened beverage(s) daily.She exercises with enough effort to increase her heart rate 10 to 19 minutes per day.  She exercises with enough effort to increase her heart rate 3 or less days per week.   She is taking medications regularly.       Patient has been experiencing epistaxis bilaterally intermittently for 1-2 weeks.  Bleeding lasts about 5 minutes and stops with pressure.  She has tried nasal saline. No other symptoms. No recent illnesses or nasal trauma    Review of Systems   Constitutional, HEENT, cardiovascular, pulmonary, gi and gu systems are negative, except as otherwise noted.      Objective    /74 (BP Location: Left arm, Patient Position: Sitting, Cuff Size: Adult Large)   Pulse 79   Temp 98.4  F (36.9  C) (Oral)   Resp 16   Ht 1.575 m (5' 2\")   Wt 73.8 kg (162 lb 12.8 oz)   LMP 01/16/2023 (Approximate)   SpO2 99%   BMI 29.78 kg/m    Body mass index is 29.78 kg/m .  Physical Exam   GENERAL: healthy, alert and no distress  EYES: Eyes grossly normal to inspection, PERRL and conjunctivae and sclerae normal  HENT: ear canals and TM's normal, nasal turbinates appear " erythematous, friable, without focal lesion or active bleeding present. Small amount of dried blood noted bilaterally in nares  NECK: no adenopathy  RESP: lungs clear to auscultation - no rales, rhonchi or wheezes  CV: regular rate and rhythm, normal S1 S2, no S3 or S4, no murmurg  PSYCH: mentation appears normal, affect normal/bright               Improved

## 2023-02-13 ENCOUNTER — TRANSFERRED RECORDS (OUTPATIENT)
Dept: HEALTH INFORMATION MANAGEMENT | Facility: CLINIC | Age: 48
End: 2023-02-13

## 2023-02-19 DIAGNOSIS — I10 ESSENTIAL HYPERTENSION: ICD-10-CM

## 2023-02-20 DIAGNOSIS — I10 ESSENTIAL HYPERTENSION WITH GOAL BLOOD PRESSURE LESS THAN 140/90: ICD-10-CM

## 2023-02-20 RX ORDER — AMLODIPINE BESYLATE 10 MG/1
TABLET ORAL
Qty: 90 TABLET | Refills: 0 | Status: SHIPPED | OUTPATIENT
Start: 2023-02-20 | End: 2023-04-04

## 2023-02-20 RX ORDER — LABETALOL 100 MG/1
TABLET, FILM COATED ORAL
Qty: 180 TABLET | Refills: 1 | Status: SHIPPED | OUTPATIENT
Start: 2023-02-20 | End: 2023-04-04

## 2023-02-23 ENCOUNTER — TRANSFERRED RECORDS (OUTPATIENT)
Dept: HEALTH INFORMATION MANAGEMENT | Facility: CLINIC | Age: 48
End: 2023-02-23

## 2023-04-04 ENCOUNTER — OFFICE VISIT (OUTPATIENT)
Dept: FAMILY MEDICINE | Facility: CLINIC | Age: 48
End: 2023-04-04
Payer: COMMERCIAL

## 2023-04-04 VITALS
WEIGHT: 158 LBS | HEART RATE: 75 BPM | TEMPERATURE: 98 F | DIASTOLIC BLOOD PRESSURE: 70 MMHG | OXYGEN SATURATION: 97 % | SYSTOLIC BLOOD PRESSURE: 104 MMHG | BODY MASS INDEX: 28.9 KG/M2

## 2023-04-04 DIAGNOSIS — I10 ESSENTIAL HYPERTENSION: Primary | ICD-10-CM

## 2023-04-04 DIAGNOSIS — E78.5 HYPERLIPIDEMIA LDL GOAL <130: ICD-10-CM

## 2023-04-04 DIAGNOSIS — R04.0 NASAL BLEEDING: ICD-10-CM

## 2023-04-04 PROCEDURE — 99214 OFFICE O/P EST MOD 30 MIN: CPT | Performed by: FAMILY MEDICINE

## 2023-04-04 RX ORDER — LABETALOL 100 MG/1
100 TABLET, FILM COATED ORAL 2 TIMES DAILY
Qty: 180 TABLET | Refills: 1 | Status: SHIPPED | OUTPATIENT
Start: 2023-04-04 | End: 2023-09-13

## 2023-04-04 RX ORDER — AMLODIPINE BESYLATE 10 MG/1
10 TABLET ORAL DAILY
Qty: 90 TABLET | Refills: 1 | Status: SHIPPED | OUTPATIENT
Start: 2023-04-04 | End: 2023-09-13

## 2023-04-04 ASSESSMENT — PAIN SCALES - GENERAL: PAINLEVEL: NO PAIN (0)

## 2023-04-04 NOTE — PROGRESS NOTES
"  Assessment & Plan     (I10) Essential hypertension  (primary encounter diagnosis)  Comment:   Plan: labetalol (NORMODYNE) 100 MG tablet            (R04.0) Nasal bleeding  Comment: resolved   Plan: Cares and  concerns and prevention  Discussed.  follow up if problem         (E78.5) Hyperlipidemia LDL goal <130  Comment: wants to wait on statin   Plan:  Lipid  has been mildly elevated. Need to watch diet ( low fat, low cholesterol, high fiber diet)  and exercise.    to help improve lipid. Follow up recheck in 6 months.       Check labs. refill sent.Cares and  treatment discussed. Follow  up if problem   Patient expressed understanding and agreement with treatment plan. All patient's questions were answered, will let me know if has more later.  Medications: Rx's: Reviewed the potential side effects/complications of medications prescribed.        BMI:   Estimated body mass index is 28.9 kg/m  as calculated from the following:    Height as of 2/7/23: 1.575 m (5' 2\").    Weight as of this encounter: 71.7 kg (158 lb).   Weight management plan: Discussed healthy diet and exercise guidelines    Regular exercise  See Patient Instructions    Korina Garcia MD  [facility] [place] is a 47 year old, presenting for the following health issues:  RECHECK and Nose Bleeds         View : No data to display.              History of Present Illness       Hyperlipidemia:  She presents for follow up of hyperlipidemia.  She is not taking medication to lower cholesterol. She is not having myalgia or other side effects to statin medications.    She eats 2-3 servings of fruits and vegetables daily.She consumes 0 sweetened beverage(s) daily.She exercises with enough effort to increase her heart rate 9 or less minutes per day.  She exercises with enough effort to increase her heart rate 3 or less days per week.   She is taking medications regularly.    Had some issues with nose bleed and seen ENT " recently. her nose bled has resolve since after she had cautrization with ENT and no recurrence since     Hypertension Follow-up      Do you check your blood pressure regularly outside of the clinic? Yes     Are you following a low salt diet? Yes    Are your blood pressures ever more than 140 on the top number (systolic) OR more   than 90 on the bottom number (diastolic), for example 140/90? No      Review of Systems   Constitutional, HEENT, cardiovascular, pulmonary, GI, , musculoskeletal, neuro, skin, endocrine and psych systems are negative, except as otherwise noted.      Objective    /70   Pulse 75   Temp 98  F (36.7  C) (Tympanic)   Wt 71.7 kg (158 lb)   LMP 03/30/2023 (Approximate)   SpO2 97%   BMI 28.90 kg/m    Body mass index is 28.9 kg/m .  Physical Exam   GENERAL: healthy, alert and no distress  EYES: Eyes grossly normal to inspection,   RESP: lungs clear to auscultation - no rales, rhonchi or wheezes  CV: regular rate and rhythm, normal S1 S2, no S3 or S4, no murmur,   ABDOMEN: soft, nontender, no hepatosplenomegaly, no masses and bowel sounds normal  MS: no edema  NEURO: Normal strength and tone, mentation intact and speech normal  PSYCH: mentation appears normal, affect normal/bright

## 2023-08-07 ENCOUNTER — PATIENT OUTREACH (OUTPATIENT)
Dept: CARE COORDINATION | Facility: CLINIC | Age: 48
End: 2023-08-07
Payer: COMMERCIAL

## 2023-09-04 ENCOUNTER — PATIENT OUTREACH (OUTPATIENT)
Dept: CARE COORDINATION | Facility: CLINIC | Age: 48
End: 2023-09-04
Payer: COMMERCIAL

## 2023-09-12 ASSESSMENT — ENCOUNTER SYMPTOMS
NERVOUS/ANXIOUS: 0
BREAST MASS: 0
MYALGIAS: 0
CONSTIPATION: 0
EYE PAIN: 0
HEMATURIA: 0
JOINT SWELLING: 0
SHORTNESS OF BREATH: 0
DYSURIA: 0
DIZZINESS: 0
CHILLS: 0
ABDOMINAL PAIN: 0
HEARTBURN: 1
FEVER: 0
FREQUENCY: 0
PARESTHESIAS: 0
WEAKNESS: 0
COUGH: 0
NAUSEA: 0
DIARRHEA: 0
HEADACHES: 0
HEMATOCHEZIA: 0
SORE THROAT: 0
PALPITATIONS: 0
ARTHRALGIAS: 0

## 2023-09-13 ENCOUNTER — OFFICE VISIT (OUTPATIENT)
Dept: FAMILY MEDICINE | Facility: CLINIC | Age: 48
End: 2023-09-13
Payer: COMMERCIAL

## 2023-09-13 ENCOUNTER — TELEPHONE (OUTPATIENT)
Dept: FAMILY MEDICINE | Facility: CLINIC | Age: 48
End: 2023-09-13

## 2023-09-13 VITALS
HEART RATE: 73 BPM | WEIGHT: 161 LBS | TEMPERATURE: 97 F | OXYGEN SATURATION: 97 % | BODY MASS INDEX: 29.63 KG/M2 | RESPIRATION RATE: 20 BRPM | SYSTOLIC BLOOD PRESSURE: 124 MMHG | DIASTOLIC BLOOD PRESSURE: 80 MMHG | HEIGHT: 62 IN

## 2023-09-13 DIAGNOSIS — Z12.31 BREAST CANCER SCREENING BY MAMMOGRAM: ICD-10-CM

## 2023-09-13 DIAGNOSIS — K21.9 GASTROESOPHAGEAL REFLUX DISEASE WITHOUT ESOPHAGITIS: ICD-10-CM

## 2023-09-13 DIAGNOSIS — I10 ESSENTIAL HYPERTENSION: ICD-10-CM

## 2023-09-13 DIAGNOSIS — Z00.00 ROUTINE HISTORY AND PHYSICAL EXAMINATION OF ADULT: Primary | ICD-10-CM

## 2023-09-13 DIAGNOSIS — I10 ESSENTIAL HYPERTENSION WITH GOAL BLOOD PRESSURE LESS THAN 140/90: ICD-10-CM

## 2023-09-13 DIAGNOSIS — E78.5 HYPERLIPIDEMIA LDL GOAL <130: ICD-10-CM

## 2023-09-13 LAB
ERYTHROCYTE [DISTWIDTH] IN BLOOD BY AUTOMATED COUNT: 12.1 % (ref 10–15)
HCT VFR BLD AUTO: 41.3 % (ref 35–47)
HGB BLD-MCNC: 13.8 G/DL (ref 11.7–15.7)
MCH RBC QN AUTO: 29.9 PG (ref 26.5–33)
MCHC RBC AUTO-ENTMCNC: 33.4 G/DL (ref 31.5–36.5)
MCV RBC AUTO: 89 FL (ref 78–100)
PLATELET # BLD AUTO: 275 10E3/UL (ref 150–450)
RBC # BLD AUTO: 4.62 10E6/UL (ref 3.8–5.2)
WBC # BLD AUTO: 9.3 10E3/UL (ref 4–11)

## 2023-09-13 PROCEDURE — 80053 COMPREHEN METABOLIC PANEL: CPT | Performed by: FAMILY MEDICINE

## 2023-09-13 PROCEDURE — 80061 LIPID PANEL: CPT | Performed by: FAMILY MEDICINE

## 2023-09-13 PROCEDURE — 85027 COMPLETE CBC AUTOMATED: CPT | Performed by: FAMILY MEDICINE

## 2023-09-13 PROCEDURE — 90686 IIV4 VACC NO PRSV 0.5 ML IM: CPT | Performed by: FAMILY MEDICINE

## 2023-09-13 PROCEDURE — 90471 IMMUNIZATION ADMIN: CPT | Performed by: FAMILY MEDICINE

## 2023-09-13 PROCEDURE — 36415 COLL VENOUS BLD VENIPUNCTURE: CPT | Performed by: FAMILY MEDICINE

## 2023-09-13 PROCEDURE — 99214 OFFICE O/P EST MOD 30 MIN: CPT | Mod: 25 | Performed by: FAMILY MEDICINE

## 2023-09-13 PROCEDURE — 99396 PREV VISIT EST AGE 40-64: CPT | Mod: 25 | Performed by: FAMILY MEDICINE

## 2023-09-13 RX ORDER — AMLODIPINE BESYLATE 10 MG/1
10 TABLET ORAL DAILY
Qty: 90 TABLET | Refills: 1 | Status: SHIPPED | OUTPATIENT
Start: 2023-09-13 | End: 2024-04-30

## 2023-09-13 RX ORDER — LABETALOL 100 MG/1
100 TABLET, FILM COATED ORAL 2 TIMES DAILY
Qty: 180 TABLET | Refills: 1 | Status: SHIPPED | OUTPATIENT
Start: 2023-09-13 | End: 2024-04-09

## 2023-09-13 ASSESSMENT — ENCOUNTER SYMPTOMS
PALPITATIONS: 0
JOINT SWELLING: 0
HEMATOCHEZIA: 0
WEAKNESS: 0
FREQUENCY: 0
CHILLS: 0
DIARRHEA: 0
DIZZINESS: 0
ABDOMINAL PAIN: 0
EYE PAIN: 0
HEMATURIA: 0
HEARTBURN: 1
FEVER: 0
DYSURIA: 0
SHORTNESS OF BREATH: 0
COUGH: 0
HEADACHES: 0
CONSTIPATION: 0
MYALGIAS: 0
NERVOUS/ANXIOUS: 0
SORE THROAT: 0
NAUSEA: 0
BREAST MASS: 0
ARTHRALGIAS: 0
PARESTHESIAS: 0

## 2023-09-13 ASSESSMENT — PAIN SCALES - GENERAL: PAINLEVEL: NO PAIN (0)

## 2023-09-13 NOTE — TELEPHONE ENCOUNTER
Medication was approved today,    Will close duplicate encounter.       Leonie Bueno RN  [facility]

## 2023-09-13 NOTE — PATIENT INSTRUCTIONS
Check abs   Take medications as directed.  Cares and symptomatic cares discussed   Follow up if problem or concern

## 2023-09-13 NOTE — PROGRESS NOTES
SUBJECTIVE:   CC: Juany is an 47 year old who presents for preventive health visit.       9/13/2023     9:16 AM   Additional Questions   Roomed by gregg       Healthy Habits:     Getting at least 3 servings of Calcium per day:  Yes    Bi-annual eye exam:  Yes    Dental care twice a year:  Yes    Sleep apnea or symptoms of sleep apnea:  None    Diet:  Regular (no restrictions)    Frequency of exercise:  1 day/week    Duration of exercise:  15-30 minutes    Taking medications regularly:  Yes    Medication side effects:  Not applicable    Additional concerns today:  No      Today's PHQ-2 Score:       9/12/2023     6:18 PM   PHQ-2 ( 1999 [employer])   Q1: Little interest or pleasure in doing things 0   Q2: Feeling down, depressed or hopeless 0   PHQ-2 Score 0   Q1: Little interest or pleasure in doing things Not at all   Q2: Feeling down, depressed or hopeless Not at all   PHQ-2 Score 0           PROBLEMS TO ADD ON...    GERD/Heartburn  Duration of complaint: has been  mor gassy bloated and heart burn indigestion lately so concerned .   Has had possible mild sx previously but was doing well and was not on any med's but feels like she needs to take something to help      Description of symptoms:    food getting stuck: no   nausea/vomiting: no   abdominal pain: no   black/tarry or bloody stools: no :  worse with fatty foods.  current NSAID/Aspirin use: no   Therapies tried and outcome: Tums helps     Hyperlipidemia Follow-Up    Are you regularly taking any medication or supplement to lower your cholesterol? No may consider only if numbers are higher but not sure yet   Are you having muscle aches or other side effects that you think could be caused by your cholesterol lowering medication?  No    Hypertension Follow-up    Do you check your blood pressure regularly outside of the clinic? Yes  due for labs also need refill. Feeling well otherwise   Are you following a low salt diet? Yes  Are your blood pressures ever more  than 140 on the top number (systolic) OR more   than 90 on the bottom number (diastolic), for example 140/90? No      Social History     Tobacco Use    Smoking status: Former     Packs/day: 0.50     Years: 8.00     Pack years: 4.00     Types: Cigarettes     Start date:      Quit date: 2010     Years since quittin.5    Smokeless tobacco: Never   Substance Use Topics    Alcohol use: No     Alcohol/week: 0.0 standard drinks of alcohol             2023     6:17 PM   Alcohol Use   Prescreen: >3 drinks/day or >7 drinks/week? No     Reviewed orders with patient.  Reviewed health maintenance and updated orders accordingly - Yes  Patient Active Problem List   Diagnosis    JOINT PAIN-MULT JTS, W/U NEG ~    Former smoker    Lumbar radiculopathy    Gastroesophageal reflux disease, esophagitis presence not specified    Seasonal allergic rhinitis    Essential hypertension with goal blood pressure less than 140/90    BMI 29.0-29.9,adult    Atherosclerosis of both carotid arteries    Precordial pain    Hyperlipidemia LDL goal <130    Breast microcalcifications     Past Surgical History:   Procedure Laterality Date     SECTION          COLONOSCOPY N/A 10/24/2022    Procedure: COLONOSCOPY, FLEXIBLE, WITH LESION REMOVAL USING SNARE;  Surgeon: Cailin Romero MD;  Location: [facility] NONSPECIFIC PROCEDURE      L BREAST BX, bening 2014        Social History     Tobacco Use    Smoking status: Former     Packs/day: 0.50     Years: 8.00     Pack years: 4.00     Types: Cigarettes     Start date:      Quit date: 2010     Years since quittin.5    Smokeless tobacco: Never   Substance Use Topics    Alcohol use: No     Alcohol/week: 0.0 standard drinks of alcohol     Family History   Problem Relation Age of Onset    Gynecology Mother         fibroids    Lipids Mother     Hypertension Mother     Blood Disease Father         non higd lymphoma    Diabetes Maternal Grandmother      Cerebrovascular Disease Maternal Grandfather          of stroke at age 50+    Arthritis Paternal Grandmother     C.A.D. No family hx of     Breast Cancer No family hx of     Cancer - colorectal No family hx of     Colon Cancer No family hx of            Breast Cancer Screening:    FHS-7:       2021    10:06 AM   Breast CA Risk Assessment (FHS-7)   Did any of your first-degree relatives have breast or ovarian cancer? No   Did any of your relatives have bilateral breast cancer? No   Did any man in your family have breast cancer? No   Did any woman in your family have breast and ovarian cancer? No   Did any woman in your family have breast cancer before age 50 y? No   Do you have 2 or more relatives with breast and/or ovarian cancer? No   Do you have 2 or more relatives with breast and/or bowel cancer? No       Mammogram Screening: Recommended annual mammography  Pertinent mammograms are reviewed under the imaging tab.    History of abnormal Pap smear: NO - age 30- 65 PAP every 3 years recommended      Latest Ref Rng & Units 2022     2:56 PM 2019    11:09 AM 2019    11:00 AM   PAP / HPV   PAP  Negative for Intraepithelial Lesion or Malignancy (NILM)      PAP (Historical)   NIL     HPV 16 DNA Negative Negative   Negative    HPV 18 DNA Negative Negative   Negative    Other HR HPV Negative Negative   Negative      Reviewed and updated as needed this visit by clinical staff                  Reviewed and updated as needed this visit by Provider                 Past Medical History:   Diagnosis Date    HX FIBROADENOMA L BREAST     JOINT PAIN-MULT JTS, W/U NEG ~     SPONTANEOUS  03        Review of Systems   Constitutional:  Negative for chills and fever.   HENT:  Negative for congestion, ear pain, hearing loss and sore throat.    Eyes:  Negative for pain and visual disturbance.   Respiratory:  Negative for cough and shortness of breath.    Cardiovascular:  Negative for chest pain,  palpitations and peripheral edema.   Gastrointestinal:  Positive for heartburn. Negative for abdominal pain, constipation, diarrhea, hematochezia and nausea.   Breasts:  Negative for tenderness, breast mass and discharge.   Genitourinary:  Negative for dysuria, frequency, genital sores, hematuria, pelvic pain, urgency, vaginal bleeding and vaginal discharge.   Musculoskeletal:  Negative for arthralgias, joint swelling and myalgias.   Skin:  Negative for rash.   Neurological:  Negative for dizziness, weakness, headaches and paresthesias.   Psychiatric/Behavioral:  Negative for mood changes. The patient is not nervous/anxious.      CONSTITUTIONAL: NEGATIVE for fever, chills, change in weight  INTEGUMENTARY/SKIN: NEGATIVE for worrisome rashes, moles or lesions  EYES: NEGATIVE for vision changes or irritation  ENT: NEGATIVE for ear, mouth and throat problems  RESP: NEGATIVE for significant cough or SOB  BREAST: NEGATIVE for masses, tenderness or discharge  CV: NEGATIVE for chest pain, palpitations or peripheral edema  GI: NEGATIVE for nausea, abdominal pain, heartburn, or change in bowel habits  : NEGATIVE for unusual urinary or vaginal symptoms. No vaginal bleeding.  MUSCULOSKELETAL: NEGATIVE for significant arthralgias or myalgia  NEURO: NEGATIVE for weakness, dizziness or paresthesias  ENDOCRINE: NEGATIVE for temperature intolerance, skin/hair changes  HEME/ALLERGY/IMMUNE: NEGATIVE for bleeding problems  PSYCHIATRIC: NEGATIVE for changes in mood or affect      OBJECTIVE:   There were no vitals taken for this visit.  Physical Exam  GENERAL APPEARANCE: healthy, alert and no distress  EYES: Eyes grossly normal to inspection, PERRL and conjunctivae and sclerae normal  HENT: ear canals and TM's normal, nose and mouth without ulcers or lesions, oropharynx clear and oral mucous membranes moist  NECK: no adenopathy, no asymmetry, masses, or scars and thyroid normal to palpation  RESP: lungs clear to auscultation - no  rales, rhonchi or wheezes  BREAST: normal without masses, tenderness or nipple discharge and no palpable axillary masses or adenopathy  CV: regular rate and rhythm, normal S1 S2, no S3 or S4, no murmur, click or rub, no peripheral edema and peripheral pulses strong  ABDOMEN: soft, nontender, no hepatosplenomegaly, no masses and bowel sounds normal   (female): deferred  MS: no musculoskeletal defects are noted and gait is age appropriate without ataxia  SKIN: no suspicious lesions or rashes  NEURO: Normal strength and tone, sensory exam grossly normal, mentation intact and speech normal  PSYCH: mentation appears normal and affect normal/bright        ASSESSMENT/PLAN:     (Z00.00) Routine history and physical examination of adult  (primary encounter diagnosis)  Comment:   Plan: COVID-19 mRNA BIVALENT vaccine, PFIZER BOOSTER,        30 MCG/0.3ML injection               (E78.5) Hyperlipidemia LDL goal <130  Comment:   Plan: Lipid panel reflex to direct LDL Non-fasting            (Z12.31) Breast cancer screening by mammogram  Comment:   Plan: she is already scheduled       (I10) Essential hypertension with goal blood pressure less than 140/90  Comment:   Plan: COMPREHENSIVE METABOLIC PANEL, Lipid panel         reflex to direct LDL Non-fasting, amLODIPine         (NORVASC) 10 MG tablet, labetalol (NORMODYNE)         100 MG tablet, CBC       (I10) Essential hypertension  Comment:   Plan: amLODIPine (NORVASC) 10 MG tablet, labetalol         (NORMODYNE) 100 MG tablet        BP in adequate control. Discussed cares, low fat low salt  diet etc. Check labs, call pt with results.         Refill given. encouraged home BP monitoring. Follow up recheck in 6 months, sooner if problem.       (K21.9) Gastroesophageal reflux disease without esophagitis  Comment: taking frequent Tums   Plan: omeprazole (PRILOSEC) 20 MG DR capsule,         Helicobacter pylori Antigen Stool, CBC              Discussed possible differential diagnosis for  her symptoms. Sounds like GERD, also talked about H pylori and other causes of recurrent heart burn . Check labs  also she is willing to try Prilosec 20 mg to see if helps with sx talked about reflux precautions etc . Suggest follow up in 4-6 week, sooner if problem. Consider further evaluation if needed.       Check labs. refill sent.Cares and  treatment discussed follow. up if problem   Patient expressed understanding and agreement with treatment plan. All patient's questions were answered, will let me know if has more later.  Medications: Rx's: Reviewed the potential side effects/complications of medications prescribed.       COUNSELING:  Reviewed preventive health counseling, as reflected in patient instructions       Regular exercise       Healthy diet/nutrition       Vision screening       Hearing screening       Alcohol Use       Osteoporosis prevention/bone health       Colorectal Cancer Screening       (Janie)menopause management        She reports that she quit smoking about 13 years ago. Her smoking use included cigarettes. She started smoking about 21 years ago. She has a 4.00 pack-year smoking history. She has never used smokeless tobacco.          Korina Garcia MD  [facility] [place]

## 2023-09-14 LAB
ALBUMIN SERPL BCG-MCNC: 4.4 G/DL (ref 3.5–5.2)
ALP SERPL-CCNC: 54 U/L (ref 35–104)
ALT SERPL W P-5'-P-CCNC: 18 U/L (ref 0–50)
ANION GAP SERPL CALCULATED.3IONS-SCNC: 10 MMOL/L (ref 7–15)
AST SERPL W P-5'-P-CCNC: 22 U/L (ref 0–45)
BILIRUB SERPL-MCNC: 1 MG/DL
BUN SERPL-MCNC: 11.1 MG/DL (ref 6–20)
CALCIUM SERPL-MCNC: 9.4 MG/DL (ref 8.6–10)
CHLORIDE SERPL-SCNC: 99 MMOL/L (ref 98–107)
CHOLEST SERPL-MCNC: 201 MG/DL
CREAT SERPL-MCNC: 0.76 MG/DL (ref 0.51–0.95)
DEPRECATED HCO3 PLAS-SCNC: 28 MMOL/L (ref 22–29)
EGFRCR SERPLBLD CKD-EPI 2021: >90 ML/MIN/1.73M2
GLUCOSE SERPL-MCNC: 100 MG/DL (ref 70–99)
HDLC SERPL-MCNC: 45 MG/DL
LDLC SERPL CALC-MCNC: 135 MG/DL
NONHDLC SERPL-MCNC: 156 MG/DL
POTASSIUM SERPL-SCNC: 3.7 MMOL/L (ref 3.4–5.3)
PROT SERPL-MCNC: 7.3 G/DL (ref 6.4–8.3)
SODIUM SERPL-SCNC: 137 MMOL/L (ref 136–145)
TRIGL SERPL-MCNC: 105 MG/DL

## 2023-09-19 ENCOUNTER — HOSPITAL ENCOUNTER (OUTPATIENT)
Dept: MAMMOGRAPHY | Facility: CLINIC | Age: 48
Discharge: HOME | End: 2023-09-19
Attending: FAMILY MEDICINE | Admitting: FAMILY MEDICINE
Payer: COMMERCIAL

## 2023-09-19 DIAGNOSIS — Z12.31 VISIT FOR SCREENING MAMMOGRAM: ICD-10-CM

## 2023-09-19 PROCEDURE — 77067 SCR MAMMO BI INCL CAD: CPT

## 2024-01-09 DIAGNOSIS — K21.9 GASTROESOPHAGEAL REFLUX DISEASE WITHOUT ESOPHAGITIS: ICD-10-CM

## 2024-04-05 DIAGNOSIS — I10 ESSENTIAL HYPERTENSION: ICD-10-CM

## 2024-04-05 DIAGNOSIS — I10 ESSENTIAL HYPERTENSION WITH GOAL BLOOD PRESSURE LESS THAN 140/90: ICD-10-CM

## 2024-04-09 RX ORDER — LABETALOL 100 MG/1
100 TABLET, FILM COATED ORAL 2 TIMES DAILY
Qty: 180 TABLET | Refills: 1 | Status: SHIPPED | OUTPATIENT
Start: 2024-04-09 | End: 2024-09-19 | Stop reason: ALTCHOICE

## 2024-04-30 ENCOUNTER — MYC REFILL (OUTPATIENT)
Dept: FAMILY MEDICINE | Facility: CLINIC | Age: 49
End: 2024-04-30
Payer: COMMERCIAL

## 2024-04-30 DIAGNOSIS — I10 ESSENTIAL HYPERTENSION: ICD-10-CM

## 2024-04-30 DIAGNOSIS — I10 ESSENTIAL HYPERTENSION WITH GOAL BLOOD PRESSURE LESS THAN 140/90: ICD-10-CM

## 2024-04-30 RX ORDER — AMLODIPINE BESYLATE 10 MG/1
10 TABLET ORAL DAILY
Qty: 90 TABLET | Refills: 0 | Status: SHIPPED | OUTPATIENT
Start: 2024-04-30 | End: 2024-07-23

## 2024-04-30 NOTE — TELEPHONE ENCOUNTER
Prescription approved per [facility] Refill Protocol.  Sheila Pang, RN  [facility] Triage Nurse

## 2024-05-01 RX ORDER — AMLODIPINE BESYLATE 10 MG/1
10 TABLET ORAL DAILY
Qty: 90 TABLET | Refills: 0 | OUTPATIENT
Start: 2024-05-01

## 2024-07-23 DIAGNOSIS — I10 ESSENTIAL HYPERTENSION: ICD-10-CM

## 2024-07-23 DIAGNOSIS — I10 ESSENTIAL HYPERTENSION WITH GOAL BLOOD PRESSURE LESS THAN 140/90: ICD-10-CM

## 2024-07-23 RX ORDER — AMLODIPINE BESYLATE 10 MG/1
10 TABLET ORAL DAILY
Qty: 90 TABLET | Refills: 0 | Status: SHIPPED | OUTPATIENT
Start: 2024-07-23 | End: 2024-10-01 | Stop reason: ALTCHOICE

## 2024-09-19 ENCOUNTER — OFFICE VISIT (OUTPATIENT)
Dept: FAMILY MEDICINE | Facility: CLINIC | Age: 49
End: 2024-09-19
Payer: COMMERCIAL

## 2024-09-19 VITALS
TEMPERATURE: 98.6 F | BODY MASS INDEX: 29.35 KG/M2 | HEART RATE: 65 BPM | DIASTOLIC BLOOD PRESSURE: 76 MMHG | SYSTOLIC BLOOD PRESSURE: 104 MMHG | RESPIRATION RATE: 18 BRPM | WEIGHT: 159.5 LBS | OXYGEN SATURATION: 98 % | HEIGHT: 62 IN

## 2024-09-19 DIAGNOSIS — I10 ESSENTIAL HYPERTENSION: ICD-10-CM

## 2024-09-19 DIAGNOSIS — J30.2 SEASONAL ALLERGIC RHINITIS, UNSPECIFIED TRIGGER: Primary | ICD-10-CM

## 2024-09-19 DIAGNOSIS — E78.5 HYPERLIPIDEMIA LDL GOAL <130: ICD-10-CM

## 2024-09-19 DIAGNOSIS — I65.23 ATHEROSCLEROSIS OF BOTH CAROTID ARTERIES: ICD-10-CM

## 2024-09-19 DIAGNOSIS — Z00.00 ROUTINE HISTORY AND PHYSICAL EXAMINATION OF ADULT: Primary | ICD-10-CM

## 2024-09-19 DIAGNOSIS — I10 ESSENTIAL HYPERTENSION WITH GOAL BLOOD PRESSURE LESS THAN 140/90: ICD-10-CM

## 2024-09-19 DIAGNOSIS — Z12.31 BREAST CANCER SCREENING BY MAMMOGRAM: ICD-10-CM

## 2024-09-19 LAB
ALBUMIN SERPL BCG-MCNC: 4.3 G/DL (ref 3.5–5.2)
ALP SERPL-CCNC: 56 U/L (ref 40–150)
ALT SERPL W P-5'-P-CCNC: 14 U/L (ref 0–50)
ANION GAP SERPL CALCULATED.3IONS-SCNC: 10 MMOL/L (ref 7–15)
AST SERPL W P-5'-P-CCNC: 21 U/L (ref 0–45)
BILIRUB SERPL-MCNC: 1 MG/DL
BUN SERPL-MCNC: 10.1 MG/DL (ref 6–20)
CALCIUM SERPL-MCNC: 8.9 MG/DL (ref 8.8–10.4)
CHLORIDE SERPL-SCNC: 102 MMOL/L (ref 98–107)
CHOLEST SERPL-MCNC: 198 MG/DL
CREAT SERPL-MCNC: 0.82 MG/DL (ref 0.51–0.95)
EGFRCR SERPLBLD CKD-EPI 2021: 88 ML/MIN/1.73M2
FASTING STATUS PATIENT QL REPORTED: YES
FASTING STATUS PATIENT QL REPORTED: YES
GLUCOSE SERPL-MCNC: 106 MG/DL (ref 70–99)
HCO3 SERPL-SCNC: 25 MMOL/L (ref 22–29)
HDLC SERPL-MCNC: 39 MG/DL
LDLC SERPL CALC-MCNC: 137 MG/DL
NONHDLC SERPL-MCNC: 159 MG/DL
POTASSIUM SERPL-SCNC: 4.1 MMOL/L (ref 3.4–5.3)
PROT SERPL-MCNC: 7.3 G/DL (ref 6.4–8.3)
SODIUM SERPL-SCNC: 137 MMOL/L (ref 135–145)
TRIGL SERPL-MCNC: 111 MG/DL

## 2024-09-19 PROCEDURE — 80053 COMPREHEN METABOLIC PANEL: CPT | Performed by: FAMILY MEDICINE

## 2024-09-19 PROCEDURE — 80061 LIPID PANEL: CPT | Performed by: FAMILY MEDICINE

## 2024-09-19 PROCEDURE — 36415 COLL VENOUS BLD VENIPUNCTURE: CPT | Performed by: FAMILY MEDICINE

## 2024-09-19 PROCEDURE — 90656 IIV3 VACC NO PRSV 0.5 ML IM: CPT | Performed by: FAMILY MEDICINE

## 2024-09-19 PROCEDURE — 99214 OFFICE O/P EST MOD 30 MIN: CPT | Mod: 25 | Performed by: FAMILY MEDICINE

## 2024-09-19 PROCEDURE — 90471 IMMUNIZATION ADMIN: CPT | Performed by: FAMILY MEDICINE

## 2024-09-19 PROCEDURE — 99396 PREV VISIT EST AGE 40-64: CPT | Mod: 25 | Performed by: FAMILY MEDICINE

## 2024-09-19 RX ORDER — ATORVASTATIN CALCIUM 10 MG/1
10 TABLET, FILM COATED ORAL DAILY
Qty: 30 TABLET | Refills: 3 | OUTPATIENT
Start: 2024-09-19

## 2024-09-19 RX ORDER — LORATADINE 10 MG/1
10 TABLET ORAL DAILY
Qty: 30 TABLET | Refills: 11 | Status: SHIPPED | OUTPATIENT
Start: 2024-09-19

## 2024-09-19 RX ORDER — ATORVASTATIN CALCIUM 10 MG/1
10 TABLET, FILM COATED ORAL DAILY
Qty: 30 TABLET | Refills: 3 | Status: SHIPPED | OUTPATIENT
Start: 2024-09-19 | End: 2024-10-01

## 2024-09-19 RX ORDER — LOSARTAN POTASSIUM 50 MG/1
50 TABLET ORAL DAILY
Qty: 30 TABLET | Refills: 2 | Status: SHIPPED | OUTPATIENT
Start: 2024-09-19 | End: 2024-10-01 | Stop reason: DRUGHIGH

## 2024-09-19 ASSESSMENT — PAIN SCALES - GENERAL: PAINLEVEL: NO PAIN (0)

## 2024-09-19 NOTE — PROGRESS NOTES
Preventive Care Visit  [facility]  Korina Garcia MD, Family Medicine  Sep 19, 2024      Assessment & Plan     (Z00.00) Routine history and physical examination of adult  (primary encounter diagnosis)  Comment:   Plan: MA Screen Bilateral w/Dino            (I10) Essential hypertension with goal blood pressure less than 140/90  Comment:   Plan: COMPREHENSIVE METABOLIC PANEL, losartan         (COZAAR) 50 MG tablet          BP in adequate control. Discussed cares, low fat low salt / DASH diet etc although she wants to switch med's bc she feels like she has may be fluid retention although she has no significant edema but may be can consider taking off Norvasc. In fact since pt has hx of htn for many yr and she was labetalol primarily bc of her young age and pregnancy risk etc. But she is now comfortable trying something else so gave her losartan 50 to try. She will discontinue labetalol. amlodipine   Check labs, call pt with results. Refill given. encouraged home BP monitoring. Follow up recheck in 6 months, sooner if problem.         (Z12.31) Breast cancer screening by mammogram  Comment:   Plan: MA Screen Bilateral w/Dino            (I10) Essential hypertension  Comment: wants to switch med's  Plan: losartan (COZAAR) 50 MG tablet            (I65.23) Atherosclerosis of both carotid arteries  Comment: need follow-up check  Plan: US Carotid Bilateral, atorvastatin (LIPITOR) 10        MG tablet          (E78.5) Hyperlipidemia LDL goal <130  Comment: willing to start treatment now  Plan: Lipid panel reflex to direct LDL Non-fasting,         US Carotid Bilateral, atorvastatin (LIPITOR) 10        MG tablet          After discussion of the treatment of hyperlipidemia,risk factors, benefits of statin treatment and need to prevent potential risk of CAD, stroke etc discussed. Pt is wiling to start treatment and a statin-drug is chosen; prescription for Lipitor once daily is written. The patient  "is informed that this type of drug is usually highly effective to lower LDL cholesterol and is usually very well tolerated. However potential side effects of  the liver and periodic monitoring with  Blood tests discussed. Damage to the liver, if detected early, can be reversed by stopping the drug. Other minor GI s/e like  nausea, abdominal pain, or potential risk yellow jaundice etc discussed  and pt should report such to me directly. Statin drugs may also cause skeletal muscle injury/ myalgias  in rare cases. Be alert for pronounced persistent diffuse muscle pain and discontinue the drug immediately should such symptoms develop.      Check labs. refill sent.Cares and  treatment discussed follow. up if problem   Patient expressed understanding and agreement with treatment plan. All patient's questions were answered, will let me know if has more later.  Medications: Rx's: Reviewed the potential side effects/complications of medications prescribed.         BMI  Estimated body mass index is 29.17 kg/m  as calculated from the following:    Height as of this encounter: 1.575 m (5' 2.01\").    Weight as of this encounter: 72.3 kg (159 lb 8 oz).   Weight management plan: Discussed healthy diet and exercise guidelines    Counseling  Appropriate preventive services were addressed with this patient via screening, questionnaire, or discussion as appropriate for fall prevention, nutrition, physical activity, Tobacco-use cessation, social engagement, weight loss and cognition.  Checklist reviewing preventive services available has been given to the patient.  Reviewed patient's diet, addressing concerns and/or questions.   She is at risk for lack of exercise and has been provided with information to increase physical activity for the benefit of her well-being.       Work on weight loss  Regular exercise  See Patient Instructions    Compa Harrington is a 48 year old, presenting for the following:  Physical        9/19/2024     " 8:34 AM   Additional Questions   Roomed by Kaela HDZ        Health Care Directive  Patient does not have a Health Care Directive or Living Will: Discussed advance care planning with patient; however, patient declined at this time.    Healthy Habits:     Getting at least 3 servings of Calcium per day:  Yes    Bi-annual eye exam:  Yes    Dental care twice a year:  Yes    Sleep apnea or symptoms of sleep apnea:  None    Diet:  Gluten-free/reduced    Frequency of exercise:  2-3 days/week    Duration of exercise:  15-30 minutes    Taking medications regularly:  Yes    Barriers to taking medications:  None    Medication side effects:  None    Additional concerns today:  No        Hyperlipidemia Follow-Up    Are you regularly taking any medication or supplement to lower your cholesterol?  No but wiling to start tyreatment , as she has been avoding it for a while. Has hx of carotid stenosi  less then 50% , but willing to do follow-up check and start treatment with statin   Are you having muscle aches or other side effects that you think could be caused by your cholesterol lowering medication?  NA     Hypertension Follow-up    Do you check your blood pressure regularly outside of the clinic? Yes soemtimes she feels my be she is retaining flid so wonderin if she can a different medcation has know hx of htn sinec yiung age so has been onlabetalol an novasc but willing to try a different eidcation hat may work well and aviou feeling of f;uid retention   Are you following a low salt diet? Yes  Are your blood pressures ever more than 140 on the top number (systolic) OR more     than 90 on the bottom number (diastolic), for example 140/90? No        9/18/2024   General Health   How would you rate your overall physical health? Good   Feel stress (tense, anxious, or unable to sleep) Not at all            9/18/2024   Nutrition   Three or more servings of calcium each day? Yes   Diet: Gluten-free/reduced   How many servings of fruit and  vegetables per day? (!) 2-3   How many sweetened beverages each day? 0-1            2024   Exercise   Days per week of moderate/strenous exercise 3 days   Average minutes spent exercising at this level 30 min            2024   Social Factors   Frequency of gathering with friends or relatives Once a week   Worry food won't last until get money to buy more No   Food not last or not have enough money for food? No   Do you have housing? (Housing is defined as stable permanent housing and does not include staying ouside in a car, in a tent, in an abandoned building, in an overnight shelter, or couch-surfing.) Yes   Are you worried about losing your housing? No   Lack of transportation? No   Unable to get utilities (heat,electricity)? No            2024   Dental   Dentist two times every year? Yes            2024   TB Screening   Were you born outside of the [place]? Yes            Today's PHQ-2 Score:       2024    11:12 AM   PHQ-2 (  [employer])   Q1: Little interest or pleasure in doing things 0   Q2: Feeling down, depressed or hopeless 0   PHQ-2 Score 0   Q1: Little interest or pleasure in doing things Not at all   Q2: Feeling down, depressed or hopeless Not at all   PHQ-2 Score 0           2024   Substance Use   Alcohol more than 3/day or more than 7/wk No   Do you use any other substances recreationally? No        Social History     Tobacco Use    Smoking status: Former     Current packs/day: 0.00     Average packs/day: 0.5 packs/day for 8.1 years (4.1 ttl pk-yrs)     Types: Cigarettes     Start date:      Quit date: 2010     Years since quittin.6    Smokeless tobacco: Never   Substance Use Topics    Alcohol use: No    Drug use: No           2023   LAST FHS-7 RESULTS   1st degree relative breast or ovarian cancer No   Any relative bilateral breast cancer No   Any male have breast cancer No   Any ONE woman have BOTH breast AND ovarian cancer No   Any woman with breast  cancer before 50yrs No   2 or more relatives with breast AND/OR ovarian cancer No   2 or more relatives with breast AND/OR bowel cancer No           Mammogram Screening - Mammogram every 1-2 years updated in Health Maintenance based on mutual decision making        2024   STI Screening   New sexual partner(s) since last STI/HIV test? No        History of abnormal Pap smear: No - age 30- 64 PAP with HPV every 5 years recommended        Latest Ref Rng & Units 2022     2:56 PM 2019    11:09 AM 2019    11:00 AM   PAP / HPV   PAP  Negative for Intraepithelial Lesion or Malignancy (NILM)      PAP (Historical)   NIL     HPV 16 DNA Negative Negative   Negative    HPV 18 DNA Negative Negative   Negative    Other HR HPV Negative Negative   Negative      ASCVD Risk   The 10-year ASCVD risk score (Veronica PACHECO, et al., 2019) is: 1.2%    Values used to calculate the score:      Age: 48 years      Sex: Female      Is Non- : No      Diabetic: No      Tobacco smoker: No      Systolic Blood Pressure: 104 mmHg      Is BP treated: Yes      HDL Cholesterol: 45 mg/dL      Total Cholesterol: 201 mg/dL        2024   Contraception/Family Planning   Questions about contraception or family planning No           Reviewed and updated as needed this visit by Provider                    Patient Active Problem List   Diagnosis    JOINT PAIN-MULT JTS, W/U NEG ~    Former smoker    Lumbar radiculopathy    Gastroesophageal reflux disease, esophagitis presence not specified    Seasonal allergic rhinitis    Essential hypertension with goal blood pressure less than 140/90    BMI 29.0-29.9,adult    Atherosclerosis of both carotid arteries    Precordial pain    Hyperlipidemia LDL goal <130    Breast microcalcifications     Past Surgical History:   Procedure Laterality Date     SECTION          COLONOSCOPY N/A 10/24/2022    Procedure: COLONOSCOPY, FLEXIBLE, WITH LESION REMOVAL USING  "SNARE;  Surgeon: Cailin Romero MD;  Location: [facility] [address] NONSPECIFIC PROCEDURE      L BREAST BX, fela 2014        Social History     Tobacco Use    Smoking status: Former     Current packs/day: 0.00     Average packs/day: 0.5 packs/day for 8.1 years (4.1 ttl pk-yrs)     Types: Cigarettes     Start date:      Quit date: 2010     Years since quittin.6    Smokeless tobacco: Never   Substance Use Topics    Alcohol use: No     Family History   Problem Relation Age of Onset    Gynecology Mother         fibroids    Lipids Mother     Hypertension Mother     Blood Disease Father         non higd lymphoma    Other Cancer Father     Diabetes Maternal Grandmother     Cerebrovascular Disease Maternal Grandfather          of stroke at age 50+    Arthritis Paternal Grandmother     C.A.D. No family hx of     Breast Cancer No family hx of     Cancer - colorectal No family hx of     Colon Cancer No family hx of              Review of Systems  CONSTITUTIONAL: NEGATIVE for fever, chills, change in weight  INTEGUMENTARY/SKIN: NEGATIVE for worrisome rashes, moles or lesions  EYES: NEGATIVE for vision changes or irritation  ENT/MOUTH: NEGATIVE for ear, mouth and throat problems  RESP: NEGATIVE for significant cough or SOB  BREAST: NEGATIVE for masses, tenderness or discharge  CV: NEGATIVE for chest pain, palpitations or peripheral edema  GI: NEGATIVE for nausea, abdominal pain, heartburn, or change in bowel habits  : NEGATIVE for frequency, dysuria, or hematuria  MUSCULOSKELETAL: NEGATIVE for significant arthralgias or myalgia  NEURO: NEGATIVE for weakness, dizziness or paresthesias  ENDOCRINE: NEGATIVE for temperature intolerance, skin/hair changes  HEME: NEGATIVE for bleeding problems  PSYCHIATRIC: NEGATIVE for changes in mood or affect     Objective    Exam  /76   Pulse 65   Temp 98.6  F (37  C)   Resp 18   Ht 1.575 m (5' 2.01\")   Wt 72.3 kg (159 lb 8 oz)   LMP  (LMP Unknown)   SpO2 98%  " " BMI 29.17 kg/m     Estimated body mass index is 29.17 kg/m  as calculated from the following:    Height as of this encounter: 1.575 m (5' 2.01\").    Weight as of this encounter: 72.3 kg (159 lb 8 oz).    Physical Exam  GENERAL: alert and no distress  EYES: Eyes grossly normal to inspection, PERRL and conjunctivae and sclerae normal  HENT: ear canals and TM's normal, nose and mouth without ulcers or lesions  NECK: no adenopathy, no asymmetry, masses, or scars  RESP: lungs clear to auscultation - no rales, rhonchi or wheezes  CV: regular rate and rhythm, normal S1 S2, no S3 or S4, no murmur, click or rub, no peripheral edema  ABDOMEN: soft, nontender, no hepatosplenomegaly, no masses and bowel sounds normal  MS: no gross musculoskeletal defects noted, no edema  SKIN: no suspicious lesions or rashes  NEURO: Normal strength and tone, mentation intact and speech normal  PSYCH: mentation appears normal, affect normal/bright        Signed Electronically by: Korina Garcia MD    "

## 2024-09-23 ENCOUNTER — HOSPITAL ENCOUNTER (OUTPATIENT)
Dept: ULTRASOUND IMAGING | Facility: CLINIC | Age: 49
Discharge: HOME | End: 2024-09-23
Attending: FAMILY MEDICINE | Admitting: FAMILY MEDICINE
Payer: COMMERCIAL

## 2024-09-23 DIAGNOSIS — E78.5 HYPERLIPIDEMIA LDL GOAL <130: ICD-10-CM

## 2024-09-23 DIAGNOSIS — I65.23 ATHEROSCLEROSIS OF BOTH CAROTID ARTERIES: ICD-10-CM

## 2024-09-23 PROCEDURE — 93880 EXTRACRANIAL BILAT STUDY: CPT

## 2024-10-01 ENCOUNTER — OFFICE VISIT (OUTPATIENT)
Dept: FAMILY MEDICINE | Facility: CLINIC | Age: 49
End: 2024-10-01
Payer: COMMERCIAL

## 2024-10-01 VITALS
BODY MASS INDEX: 28.89 KG/M2 | RESPIRATION RATE: 15 BRPM | SYSTOLIC BLOOD PRESSURE: 151 MMHG | DIASTOLIC BLOOD PRESSURE: 86 MMHG | OXYGEN SATURATION: 98 % | WEIGHT: 157 LBS | HEART RATE: 71 BPM | HEIGHT: 62 IN | TEMPERATURE: 97 F

## 2024-10-01 DIAGNOSIS — E78.5 HYPERLIPIDEMIA LDL GOAL <130: ICD-10-CM

## 2024-10-01 DIAGNOSIS — I10 ESSENTIAL HYPERTENSION WITH GOAL BLOOD PRESSURE LESS THAN 140/90: ICD-10-CM

## 2024-10-01 DIAGNOSIS — I10 ESSENTIAL HYPERTENSION: ICD-10-CM

## 2024-10-01 DIAGNOSIS — I65.23 ATHEROSCLEROSIS OF BOTH CAROTID ARTERIES: ICD-10-CM

## 2024-10-01 DIAGNOSIS — I10 ESSENTIAL HYPERTENSION WITH GOAL BLOOD PRESSURE LESS THAN 140/90: Primary | ICD-10-CM

## 2024-10-01 PROCEDURE — 99214 OFFICE O/P EST MOD 30 MIN: CPT | Performed by: FAMILY MEDICINE

## 2024-10-01 RX ORDER — LOSARTAN POTASSIUM AND HYDROCHLOROTHIAZIDE 12.5; 1 MG/1; MG/1
1 TABLET ORAL DAILY
Qty: 30 TABLET | Refills: 2 | Status: SHIPPED | OUTPATIENT
Start: 2024-10-01 | End: 2024-10-01

## 2024-10-01 RX ORDER — LOSARTAN POTASSIUM AND HYDROCHLOROTHIAZIDE 12.5; 1 MG/1; MG/1
1 TABLET ORAL DAILY
Qty: 90 TABLET | Refills: 0 | Status: SHIPPED | OUTPATIENT
Start: 2024-10-01 | End: 2025-01-14

## 2024-10-01 RX ORDER — ATORVASTATIN CALCIUM 10 MG/1
10 TABLET, FILM COATED ORAL DAILY
Qty: 90 TABLET | Refills: 3 | Status: SHIPPED | OUTPATIENT
Start: 2024-10-01

## 2024-10-01 ASSESSMENT — PAIN SCALES - GENERAL: PAINLEVEL: NO PAIN (0)

## 2024-10-01 NOTE — PROGRESS NOTES
Assessment & Plan     (I10) Essential hypertension with goal blood pressure less than 140/90  (primary encounter diagnosis)  Comment:   Plan: losartan-hydrochlorothiazide (HYZAAR) 100-12.5         MG tablet            (I10) Essential hypertension  Comment:  switch to losartan recently , off Norvasc bc she thinsk ankles looked puffy.   Plan: losartan-hydrochlorothiazide (HYZAAR) 100-12.5         MG tablet        BP not  in adequate control. Discussed cares, low fat low salt  diet etc. Will increase losartan to 100 mg also added low dose hydrochlorothiazide 12.5. will jasmeet to monitor    encouraged home BP monitoring. Follow up recheck in 4-6 weeks ,  sooner if problem.       (E78.5) Hyperlipidemia LDL goal <130  Comment: willing to start treatment now  Plan: Lipid panel reflex to direct LDL Fasting,         atorvastatin (LIPITOR) 10 MG tablet           Recently started Lipitor, tolerating well and willing to continue. Need follow-up lipid check in 3-4 months.     (I65.23) Atherosclerosis of both carotid arteries  Comment: stable per recent ultrasound  Plan: atorvastatin (LIPITOR) 10 MG tablet        Check labs. refill sent.Cares and  treatment discussed. follow up if problem   Patient expressed understanding and agreement with treatment plan. All patient's questions were answered, will let me know if has more later.  Medications: Rx's: Reviewed the potential side effects/complications of medications prescribed.           Regular exercise  See Patient Instructions    Compa   Juany is a 48 year old, presenting for the following health issues:  Hypertension and Recheck Medication      10/1/2024     7:34 AM   Additional Questions   Roomed by Arelis HDZ     History of Present Illness       Hypertension: She presents for follow up of hypertension.  She does check blood pressure  regularly outside of the clinic. Outpatient blood pressures have not been over 140/90. She follows a low salt diet.     She eats 2-3 servings  "of fruits and vegetables daily.She consumes 0 sweetened beverage(s) daily.She exercises with enough effort to increase her heart rate 9 or less minutes per day.  She exercises with enough effort to increase her heart rate 3 or less days per week.   She is taking medications regularly.   Losartan was started recently and no problem taking med's but BP is still fluctuating.   also has tapered down amlodioine now. Ankle puffiness is doing better .     Hyperlipidemia Follow-Up    Are you regularly taking any medication or supplement to lower your cholesterol?   Yes- Atorvastatin, started recently and no problem taking med's   Are you having muscle aches or other side effects that you think could be caused by your cholesterol lowering medication?  No      Medication Followup of Losartan and atorvastatin   Taking Medication as prescribed: yes  Side Effects:  none so far  Medication Helping Symptoms:  not applicable        Review of Systems  Constitutional, HEENT, cardiovascular, pulmonary, GI, , musculoskeletal, neuro, skin, endocrine and psych systems are negative, except as otherwise noted.      Objective    BP (!) 151/86 (BP Location: Right arm, Patient Position: Sitting, Cuff Size: Adult Regular)   Pulse 71   Temp 97  F (36.1  C) (Temporal)   Resp 15   Ht 1.575 m (5' 2\")   Wt 71.2 kg (157 lb)   LMP  (LMP Unknown)   SpO2 98%   BMI 28.72 kg/m    Body mass index is 28.72 kg/m .  Physical Exam   GENERAL: alert and no distress  HENT: ear canals and TM's normal, nose and mouth without ulcers or lesions  RESP: lungs clear to auscultation - no rales, rhonchi or wheezes  CV: regular rate and rhythm, normal S1 S2, no S3 or S4, no murmur, click or rub, no peripheral edema  ABDOMEN: soft, nontender, no hepatosplenomegaly, no masses and bowel sounds normal  MS: no edema  NEURO: Normal strength and tone, mentation intact and speech normal  PSYCH: mentation appears normal, affect normal/bright            Signed " Electronically by: Korina Garcia MD

## 2024-10-22 ENCOUNTER — ANCILLARY PROCEDURE (OUTPATIENT)
Dept: MAMMOGRAPHY | Facility: CLINIC | Age: 49
End: 2024-10-22
Attending: FAMILY MEDICINE
Payer: COMMERCIAL

## 2024-10-22 DIAGNOSIS — Z12.31 BREAST CANCER SCREENING BY MAMMOGRAM: ICD-10-CM

## 2024-10-22 DIAGNOSIS — Z00.00 ROUTINE HISTORY AND PHYSICAL EXAMINATION OF ADULT: ICD-10-CM

## 2024-10-22 PROCEDURE — 77063 BREAST TOMOSYNTHESIS BI: CPT | Mod: TC | Performed by: RADIOLOGY

## 2024-10-22 PROCEDURE — 77067 SCR MAMMO BI INCL CAD: CPT | Mod: TC | Performed by: RADIOLOGY

## 2024-10-23 ENCOUNTER — OFFICE VISIT (OUTPATIENT)
Dept: INTERNAL MEDICINE | Facility: CLINIC | Age: 49
End: 2024-10-23
Payer: COMMERCIAL

## 2024-10-23 VITALS
BODY MASS INDEX: 28.45 KG/M2 | WEIGHT: 154.6 LBS | TEMPERATURE: 97.9 F | RESPIRATION RATE: 16 BRPM | DIASTOLIC BLOOD PRESSURE: 76 MMHG | OXYGEN SATURATION: 97 % | HEIGHT: 62 IN | HEART RATE: 83 BPM | SYSTOLIC BLOOD PRESSURE: 125 MMHG

## 2024-10-23 DIAGNOSIS — I10 PRIMARY HYPERTENSION: Primary | ICD-10-CM

## 2024-10-23 LAB
ANION GAP SERPL CALCULATED.3IONS-SCNC: 12 MMOL/L (ref 7–15)
BUN SERPL-MCNC: 10.8 MG/DL (ref 6–20)
CALCIUM SERPL-MCNC: 9.5 MG/DL (ref 8.8–10.4)
CHLORIDE SERPL-SCNC: 102 MMOL/L (ref 98–107)
CREAT SERPL-MCNC: 0.8 MG/DL (ref 0.51–0.95)
EGFRCR SERPLBLD CKD-EPI 2021: 90 ML/MIN/1.73M2
GLUCOSE SERPL-MCNC: 99 MG/DL (ref 70–99)
HCO3 SERPL-SCNC: 28 MMOL/L (ref 22–29)
POTASSIUM SERPL-SCNC: 3.6 MMOL/L (ref 3.4–5.3)
SODIUM SERPL-SCNC: 142 MMOL/L (ref 135–145)

## 2024-10-23 PROCEDURE — 99213 OFFICE O/P EST LOW 20 MIN: CPT | Performed by: INTERNAL MEDICINE

## 2024-10-23 PROCEDURE — G2211 COMPLEX E/M VISIT ADD ON: HCPCS | Performed by: INTERNAL MEDICINE

## 2024-10-23 PROCEDURE — 80048 BASIC METABOLIC PNL TOTAL CA: CPT | Performed by: INTERNAL MEDICINE

## 2024-10-23 PROCEDURE — 36415 COLL VENOUS BLD VENIPUNCTURE: CPT | Performed by: INTERNAL MEDICINE

## 2024-10-23 RX ORDER — AMLODIPINE BESYLATE 10 MG/1
10 TABLET ORAL DAILY
Qty: 90 TABLET | Refills: 1 | Status: SHIPPED | OUTPATIENT
Start: 2024-10-23

## 2024-10-23 ASSESSMENT — PAIN SCALES - GENERAL: PAINLEVEL_OUTOF10: NO PAIN (0)

## 2024-10-23 NOTE — PROGRESS NOTES
"  Assessment & Plan     Primary hypertension  Present regimen of losartan/hydrochlorothiazide + amlodipine seems to be working well  If her LE edema recurs at the 10 mg dose of amlodipine we could increase her hydrochlorothiazide to 25 mg while reducing the amlodipine to 5 mg - a dose where this less commonly seen.   - Basic metabolic panel  (Ca, Cl, CO2, Creat, Gluc, K, Na, BUN); Future  - amLODIPine (NORVASC) 10 MG tablet; Take 1 tablet (10 mg) by mouth daily.  - Basic metabolic panel  (Ca, Cl, CO2, Creat, Gluc, K, Na, BUN)    Regular exercise  See Patient Instructions    The longitudinal plan of care for the diagnosis(es)/condition(s) as documented were addressed during this visit. Due to the added complexity in care, I will continue to support Juany in the subsequent management and with ongoing continuity of care.    Compa Harrington is a 48 year old, presenting for the following health issues:  HTN    Pt states she typically sees Dr. Garcia at our EP clinic.  At her annual- she c/o increasing LE edema du to amlodipine dose of 10 mg. She has been on that and labetalol 100 mg bid for HTN control.  Her regimen was switched to losartan monotherapy and at a subsequent visit increased further to 100 mg + addition of 12.5 mg hydrochlorothiazide.   On this she noticed that her BP was not controlled and restarted her amlodipine on her own.  With this her BPs have been in the 120/70 range and she hasn't noticed the recurrence of her prior edema.                         Objective    /76 (BP Location: Right arm, Patient Position: Chair, Cuff Size: Adult Large)   Pulse 83   Temp 97.9  F (36.6  C) (Tympanic)   Resp 16   Ht 1.575 m (5' 2\")   Wt 70.1 kg (154 lb 9.6 oz)   LMP  (LMP Unknown)   SpO2 97%   BMI 28.28 kg/m    Body mass index is 28.28 kg/m .  Physical Exam   GENERAL: alert and no distress  NECK: no adenopathy, no asymmetry, masses, or scars  RESP: lungs clear to auscultation - no rales, rhonchi or " wheezes  CV: regular rate and rhythm, normal S1 S2, no S3 or S4, no murmur, click or rub, no peripheral edema   MS: no gross musculoskeletal defects noted, no edema            Signed Electronically by: Chava Ortiz MD

## 2025-01-14 DIAGNOSIS — I10 ESSENTIAL HYPERTENSION: ICD-10-CM

## 2025-01-14 DIAGNOSIS — I10 ESSENTIAL HYPERTENSION WITH GOAL BLOOD PRESSURE LESS THAN 140/90: ICD-10-CM

## 2025-01-14 RX ORDER — LOSARTAN POTASSIUM AND HYDROCHLOROTHIAZIDE 12.5; 1 MG/1; MG/1
1 TABLET ORAL DAILY
Qty: 90 TABLET | Refills: 0 | Status: SHIPPED | OUTPATIENT
Start: 2025-01-14

## 2025-03-04 ENCOUNTER — HOSPITAL ENCOUNTER (EMERGENCY)
Facility: CLINIC | Age: 50
Discharge: HOME | End: 2025-03-04
Attending: EMERGENCY MEDICINE | Admitting: EMERGENCY MEDICINE
Payer: COMMERCIAL

## 2025-03-04 ENCOUNTER — APPOINTMENT (OUTPATIENT)
Dept: GENERAL RADIOLOGY | Facility: CLINIC | Age: 50
End: 2025-03-04
Attending: EMERGENCY MEDICINE
Payer: COMMERCIAL

## 2025-03-04 VITALS
TEMPERATURE: 98 F | OXYGEN SATURATION: 100 % | SYSTOLIC BLOOD PRESSURE: 151 MMHG | HEART RATE: 89 BPM | RESPIRATION RATE: 16 BRPM | DIASTOLIC BLOOD PRESSURE: 91 MMHG

## 2025-03-04 DIAGNOSIS — M25.511 ACUTE PAIN OF RIGHT SHOULDER: ICD-10-CM

## 2025-03-04 DIAGNOSIS — M54.12 CERVICAL RADICULOPATHY: ICD-10-CM

## 2025-03-04 PROCEDURE — 73030 X-RAY EXAM OF SHOULDER: CPT | Mod: RT

## 2025-03-04 PROCEDURE — 250N000013 HC RX MED GY IP 250 OP 250 PS 637: Performed by: EMERGENCY MEDICINE

## 2025-03-04 PROCEDURE — 250N000011 HC RX IP 250 OP 636: Performed by: EMERGENCY MEDICINE

## 2025-03-04 PROCEDURE — 96372 THER/PROPH/DIAG INJ SC/IM: CPT | Performed by: EMERGENCY MEDICINE

## 2025-03-04 PROCEDURE — 99284 EMERGENCY DEPT VISIT MOD MDM: CPT

## 2025-03-04 RX ORDER — ONDANSETRON 4 MG/1
4 TABLET, ORALLY DISINTEGRATING ORAL ONCE
Status: COMPLETED | OUTPATIENT
Start: 2025-03-04 | End: 2025-03-04

## 2025-03-04 RX ORDER — KETOROLAC TROMETHAMINE 15 MG/ML
15 INJECTION, SOLUTION INTRAMUSCULAR; INTRAVENOUS ONCE
Status: COMPLETED | OUTPATIENT
Start: 2025-03-04 | End: 2025-03-04

## 2025-03-04 RX ORDER — ACETAMINOPHEN 500 MG
1000 TABLET ORAL ONCE
Status: COMPLETED | OUTPATIENT
Start: 2025-03-04 | End: 2025-03-04

## 2025-03-04 RX ORDER — IBUPROFEN 200 MG
400 TABLET ORAL ONCE
Status: COMPLETED | OUTPATIENT
Start: 2025-03-04 | End: 2025-03-04

## 2025-03-04 RX ADMIN — KETOROLAC TROMETHAMINE 15 MG: 15 INJECTION, SOLUTION INTRAMUSCULAR; INTRAVENOUS at 09:49

## 2025-03-04 RX ADMIN — ACETAMINOPHEN 1000 MG: 500 TABLET, FILM COATED ORAL at 08:37

## 2025-03-04 RX ADMIN — IBUPROFEN 400 MG: 200 TABLET, FILM COATED ORAL at 08:37

## 2025-03-04 RX ADMIN — ONDANSETRON 4 MG: 4 TABLET, ORALLY DISINTEGRATING ORAL at 09:50

## 2025-03-04 ASSESSMENT — ACTIVITIES OF DAILY LIVING (ADL)
ADLS_ACUITY_SCORE: 41

## 2025-03-04 NOTE — ED PROVIDER NOTES
Emergency Department Note      History of Present Illness     Chief Complaint   Shoulder Pain      HPI   Juany Blandon is a right-handed 49 year old female who presents with shoulder pain. The patient reports that last night she developed right-sided shoulder pain around the blade after getting off a 4 hour flight and grabbing her carry-on bag. She denies hearing a pop or crack when she grabbed this and affirms that she did not sleep on her shoulder weirdly. The patient denies any other trauma or injury. She notes that the pain shoots down her right arm and into her elbow when she moves it. She states that she took ibuprofen with little relief at 0400 today. The patient denies neck pain. She denies history of shoulder issues. She takes losartan and amlodipine for hypertension.    Independent Historian   None    Review of External Notes   N/A    Past Medical History     Medical History and Problem List   Hypertension  Atherosclerosis of both carotid arteries  GERD  Lumbar radiculopathy  Allergic rhinitis  Fibroadenoma left breast    Medications   Norvasc  Hyzaar  Prilosec  Aspirin 81 mg  Lipitor    Surgical History    section  Colonoscopy  Left breast biopsy    Physical Exam     Patient Vitals for the past 24 hrs:   BP Temp Temp src Pulse Resp SpO2   25 0816 (!) 151/91 98  F (36.7  C) Temporal 89 16 100 %     Physical Exam  General: Patient in mild distress.  Alert and cooperative with exam. Normal mentation  HEENT: NC/AT. Conjunctiva without injection or scleral icterus. External ears normal.  Respiratory: Breathing comfortably on room air  CV: Normal rate, all extremities well perfused  GI:  Non-distended abdomen  Skin: Warm, dry, no rashes/open wounds on exposed skin  Musculoskeletal: No obvious deformities. RU: CMS intact. Mild tenderness to posterior shoulder without overlying skin change. Normal active/passive ROM. No significant midline cervical spine tenderness.   Neuro: Alert, answers  questions appropriately. No gross motor deficits    Diagnostics     Lab Results   Labs Ordered and Resulted from Time of ED Arrival to Time of ED Departure - No data to display    Imaging   XR Shoulder Right G/E 3 Views   Final Result   IMPRESSION: No acute fracture or malalignment. There is normal glenohumeral joint spacing. Mild to moderate acromioclavicular joint degenerative changes.          Independent Interpretation   X-ray shoulder right shows no evidence of fracture or dislocation    ED Course      Medications Administered   Medications   ibuprofen (ADVIL/MOTRIN) tablet 400 mg (400 mg Oral $Given 3/4/25 0837)   acetaminophen (TYLENOL) tablet 1,000 mg (1,000 mg Oral $Given 3/4/25 0837)   ketorolac (TORADOL) injection 15 mg (15 mg Intramuscular $Given 3/4/25 0949)   ondansetron (ZOFRAN ODT) ODT tab 4 mg (4 mg Oral $Given 3/4/25 0950)       Discussion of Management   None    ED Course   ED Course as of 03/04/25 1413   Tue Mar 04, 2025   0829 I obtained history and examined the patient as noted above   0936 I rechecked the patient and explained findings.       Additional Documentation  None    Medical Decision Making / Diagnosis     CMS Diagnoses: None    MIPS       None    MDM   Juany Blandon is a 49 year old female who presents for evaluation of atraumatic right shoulder and neck pain. There is mild associated radiculopathy; there is also likely muscular component to pain. I doubt fracture, ligamentous instability, myelopathy, dissection, septic joint, crystal arthropathy, spinal tumor or abscess at this time. I do not feel there are other worrisome etiologies at this time to prompt CT/MRI of neck/spine. I discussed worrisome symptoms/signs, if they were to evolve, that should prompt the patient to follow up more quickly or return to the ED.  There are no red flag symptoms to suggest we need further workup or advanced imaging at this point.   Supportive outpatient management is indicated. Discussed  radiculopathy, causes and treatments.  Discussed that this does not mean they necessarily need an MRI but they do need close f/u of primary if not improving.  Continue Tylenol/ibuprofen as needed for pain control.  Additional supportive care and return precautions discussed.    Disposition   The patient was discharged.     Diagnosis     ICD-10-CM    1. Acute pain of right shoulder  M25.511       2. Cervical radiculopathy  M54.12              Scribe Disclosure:  I, Fede Vasquez, am serving as a scribe at 8:48 AM on 3/4/2025 to document services personally performed by Júnior Cartagena DO based on my observations and the provider's statements to me.        Júnior Cartagena DO  03/04/25 8900

## 2025-03-04 NOTE — LETTER
March 4, 2025      To Whom It May Concern:      Juany Blandon was seen in our Emergency Department today, 03/04/25.  I expect her condition to improve over the next 2 days.  She may return to work/school when improved.    Sincerely,        Júnior Cartagena, DO

## 2025-03-04 NOTE — ED TRIAGE NOTES
Pt c.o R shoulder pain starting after her 4 hour flight yesterday, pt states she may have hurt it while moving her bag but is uncertain, CMS intact, ABCD intact.       Triage Assessment (Adult)       Row Name 03/04/25 0815          Triage Assessment    Airway WDL WDL        Respiratory WDL    Respiratory WDL WDL        Skin Circulation/Temperature WDL    Skin Circulation/Temperature WDL WDL        Cardiac WDL    Cardiac WDL WDL        Peripheral/Neurovascular WDL    Peripheral Neurovascular WDL WDL        Cognitive/Neuro/Behavioral WDL    Cognitive/Neuro/Behavioral WDL WDL

## 2025-03-05 ENCOUNTER — PATIENT OUTREACH (OUTPATIENT)
Dept: FAMILY MEDICINE | Facility: CLINIC | Age: 50
End: 2025-03-05
Payer: COMMERCIAL

## 2025-03-06 NOTE — TELEPHONE ENCOUNTER
Transitions of Care Outreach  Chief Complaint   Patient presents with    Hospital F/U     ED 3/4/25 Right Shoulder pain       Most Recent Admission Date: 3/4/2025   Most Recent Admission Diagnosis:      Most Recent Discharge Date: 3/4/2025   Most Recent Discharge Diagnosis: Acute pain of right shoulder - M25.511  Cervical radiculopathy - M54.12     Transitions of Care Assessment    Discharge Assessment  How are you doing now that you are home?: much better  How are your symptoms? (Red Flag symptoms escalate to triage hotline per guidelines): Unchanged  Do you know how to contact your clinic care team if you have future questions or changes to your health status? : Yes  Does the patient have their discharge instructions? : Yes  Does the patient have questions regarding their discharge instructions? : No  Were you started on any new medications or were there changes to any of your previous medications? : No  Does the patient have all of their medications?: Yes  Do you have questions regarding any of your medications? : No  Do you have all of your needed medical supplies or equipment (DME)?  (i.e. oxygen tank, CPAP, cane, etc.): Yes    Follow up Plan     Discharge Follow-Up  Discharge follow up appointment scheduled in alignment with recommended follow up timeframe or Transitions of Risk Category? (Low = within 30 days; Moderate= within 14 days; High= within 7 days): Yes  Discharge Follow Up Appointment Date: 03/11/25  Discharge Follow Up Appointment Scheduled with?: Primary Care Provider    Future Appointments   Date Time Provider Department Center   3/11/2025  9:00 AM Nasima Damon MD [facility]    4/23/2025 11:00 AM Chava Ortiz MD [facility]       Outpatient Plan as outlined on AVS reviewed with patient.    For any urgent concerns, please contact our 24 hour nurse triage line: [phone] ([phone])       Kandace DELUNA Do, RN

## 2025-03-11 ENCOUNTER — OFFICE VISIT (OUTPATIENT)
Dept: FAMILY MEDICINE | Facility: CLINIC | Age: 50
End: 2025-03-11
Payer: COMMERCIAL

## 2025-03-11 VITALS
DIASTOLIC BLOOD PRESSURE: 80 MMHG | HEART RATE: 80 BPM | BODY MASS INDEX: 28.35 KG/M2 | RESPIRATION RATE: 14 BRPM | OXYGEN SATURATION: 100 % | TEMPERATURE: 98.3 F | WEIGHT: 155 LBS | SYSTOLIC BLOOD PRESSURE: 130 MMHG

## 2025-03-11 DIAGNOSIS — M89.8X1 PAIN OF RIGHT SCAPULA: Primary | ICD-10-CM

## 2025-03-11 PROCEDURE — 99213 OFFICE O/P EST LOW 20 MIN: CPT | Performed by: FAMILY MEDICINE

## 2025-03-11 RX ORDER — CYCLOBENZAPRINE HCL 5 MG
5 TABLET ORAL
Qty: 15 TABLET | Refills: 0 | Status: SHIPPED | OUTPATIENT
Start: 2025-03-11

## 2025-03-11 ASSESSMENT — PAIN SCALES - GENERAL: PAINLEVEL_OUTOF10: MILD PAIN (3)

## 2025-03-11 NOTE — PROGRESS NOTES
"  Assessment & Plan       Pain of right scapula    - cyclobenzaprine (FLEXERIL) 5 MG tablet; Take 1 tablet (5 mg) by mouth nightly as needed for muscle spasms.    Recommending to use ibuprofen 400 mg twice daily for the next 3 to 4 days regularly and then as needed.  Use muscle relaxer only at nighttime.  Get a back massage done if possible.  Apply gentle heat.  Avoid physical activity that cause aggravation of the pain.  She has already noticed some improvement with the numbness felt in her forearm.  Hopefully it will gradually improve further over the next few days to weeks.  If over the next 4 to 6 weeks symptoms are still persistent, we can refer her to physical therapy.  Patient agrees to the plan.  X-ray done previously reviewed and there was no fractures noted    MED REC REQUIRED  Post Medication Reconciliation Status: discharge medications reconciled and changed, per note/orders  BMI  Estimated body mass index is 28.35 kg/m  as calculated from the following:    Height as of 10/23/24: 1.575 m (5' 2\").    Weight as of this encounter: 70.3 kg (155 lb).             Compa Harrington is a 49 year old, presenting for the following health issues:  ER F/U        3/11/2025     8:55 AM   Additional Questions   Roomed by Ethan FISHMAN     [facility]        ED/UC Followup:    Facility:  [facility]  Date of visit: 3/4/2025  Reason for visit: Right shoulder pain  Current Status: Pain is improving, radiates down arm, some numbness in forearm         Review of Systems  CONSTITUTIONAL: NEGATIVE for fever, chills, change in weight  ENT/MOUTH: NEGATIVE for ear, mouth and throat problems  RESP: NEGATIVE for significant cough or SOB  CV: NEGATIVE for chest pain, palpitations or peripheral edema      Objective    /80   Pulse 80   Temp 98.3  F (36.8  C) (Tympanic)   Resp 14   Wt 70.3 kg (155 lb)   SpO2 100%   BMI 28.35 kg/m    Body mass index is 28.35 kg/m .  Physical Exam   GENERAL: alert and no distress  NECK: no adenopathy, no " asymmetry, masses, or scars  RESP: lungs clear to auscultation - no rales, rhonchi or wheezes  CV: regular rate and rhythm  MS: No localized tenderness over the right shoulder.  Range of motion is normal.  Mild tenderness to palpate over the medial border of the scapula on the right side.  No overlying swelling, erythema or ecchymosis.            Signed Electronically by: Nasima Damon MD

## 2025-04-04 ENCOUNTER — MYC MEDICAL ADVICE (OUTPATIENT)
Dept: FAMILY MEDICINE | Facility: CLINIC | Age: 50
End: 2025-04-04
Payer: COMMERCIAL

## 2025-04-04 DIAGNOSIS — M89.8X1 PAIN OF RIGHT SCAPULA: Primary | ICD-10-CM

## 2025-04-07 NOTE — TELEPHONE ENCOUNTER
Patient was seen for pain of right scapula in an OV on 03/11/2025.    Patient now requesting PT referral due to lingering pain issues.    Ramya WAYNE,  Nikita RN  [facility]

## 2025-04-09 DIAGNOSIS — I10 ESSENTIAL HYPERTENSION WITH GOAL BLOOD PRESSURE LESS THAN 140/90: ICD-10-CM

## 2025-04-09 DIAGNOSIS — I10 ESSENTIAL HYPERTENSION: ICD-10-CM

## 2025-04-09 RX ORDER — LOSARTAN POTASSIUM AND HYDROCHLOROTHIAZIDE 12.5; 1 MG/1; MG/1
1 TABLET ORAL DAILY
Qty: 90 TABLET | Refills: 1 | Status: SHIPPED | OUTPATIENT
Start: 2025-04-09

## 2025-04-23 DIAGNOSIS — I10 PRIMARY HYPERTENSION: ICD-10-CM

## 2025-04-23 RX ORDER — AMLODIPINE BESYLATE 10 MG/1
10 TABLET ORAL DAILY
Qty: 90 TABLET | Refills: 1 | Status: SHIPPED | OUTPATIENT
Start: 2025-04-23

## 2025-05-13 ENCOUNTER — VIRTUAL VISIT (OUTPATIENT)
Dept: INTERNAL MEDICINE | Facility: CLINIC | Age: 50
End: 2025-05-13
Payer: COMMERCIAL

## 2025-05-13 DIAGNOSIS — I10 PRIMARY HYPERTENSION: Primary | ICD-10-CM

## 2025-05-13 DIAGNOSIS — E78.2 MIXED HYPERLIPIDEMIA: ICD-10-CM

## 2025-05-13 PROCEDURE — 98005 SYNCH AUDIO-VIDEO EST LOW 20: CPT | Performed by: INTERNAL MEDICINE

## 2025-05-13 NOTE — PROGRESS NOTES
"Juany is a 49 year old who is being evaluated via a billable video visit.    How would you like to obtain your AVS? MyChart  If the video visit is dropped, the invitation should be resent by: Text to cell phone: [phone]  Will anyone else be joining your video visit? No      Assessment & Plan     Primary hypertension  Goal for her should be less than 120/80.  Continue current meds, continue monitoring at home.  If her home readings are not below this threshold I would recommend increasing her losartan hydrochlorothiazide to 100/25 mg.  Her amlodipine is already at the max dose of 10 mg.  She can follow-up with her PCP for this and she is due later this fall for her annual with cervical cancer screening.    Mixed hyperlipidemia  Continue statin given evidence of mild atherosclerotic plaque on carotid ultrasound.           BMI  Estimated body mass index is 28.35 kg/m  as calculated from the following:    Height as of 10/23/24: 1.575 m (5' 2\").    Weight as of 3/11/25: 70.3 kg (155 lb).             Subjective   Juany is a 49 year old, presenting for the following health issues:  Hypertension    HPI   Patient follows up for her BP.  She was recently in the clinic seeing Dr. Damon in March at which time her blood pressure was 130/80.  She states her home readings average mid 120s over mid 80s typically   She is on losartan hydrochlorothiazide and amlodipine.    She also states she takes a statin-for hyperlipidemia (and evidence of mild atherosclerotic plaque on carotid ultrasounds)                  Objective           Vitals:  No vitals were obtained today due to virtual visit.    Physical Exam   GENERAL: alert and no distress          Video-Visit Details    Type of service:  Video Visit   Originating Location (pt. Location): Home    Distant Location (provider location):  On-site  Platform used for Video Visit: Gary  Signed Electronically by: Chava Ortiz MD    "

## 2025-07-12 DIAGNOSIS — I10 ESSENTIAL HYPERTENSION: ICD-10-CM

## 2025-07-12 DIAGNOSIS — I10 ESSENTIAL HYPERTENSION WITH GOAL BLOOD PRESSURE LESS THAN 140/90: ICD-10-CM

## 2025-07-14 RX ORDER — LOSARTAN POTASSIUM AND HYDROCHLOROTHIAZIDE 12.5; 1 MG/1; MG/1
1 TABLET ORAL DAILY
Qty: 90 TABLET | Refills: 1 | OUTPATIENT
Start: 2025-07-14

## (undated) RX ORDER — FENTANYL CITRATE 50 UG/ML
INJECTION, SOLUTION INTRAMUSCULAR; INTRAVENOUS
Status: DISPENSED
Start: 2022-10-24